# Patient Record
Sex: MALE | Race: WHITE | NOT HISPANIC OR LATINO | Employment: OTHER | URBAN - METROPOLITAN AREA
[De-identification: names, ages, dates, MRNs, and addresses within clinical notes are randomized per-mention and may not be internally consistent; named-entity substitution may affect disease eponyms.]

---

## 2017-03-13 RX ORDER — SIMVASTATIN 40 MG
40 TABLET ORAL
COMMUNITY
End: 2018-02-27 | Stop reason: DRUGHIGH

## 2017-03-13 RX ORDER — OXYCODONE HYDROCHLORIDE AND ACETAMINOPHEN 5; 325 MG/1; MG/1
1 TABLET ORAL EVERY 6 HOURS PRN
COMMUNITY
End: 2018-02-27

## 2017-03-13 RX ORDER — ALBUTEROL SULFATE 90 UG/1
2 AEROSOL, METERED RESPIRATORY (INHALATION) EVERY 4 HOURS PRN
COMMUNITY
End: 2018-02-27

## 2017-03-13 RX ORDER — LOSARTAN POTASSIUM 50 MG/1
50 TABLET ORAL DAILY
COMMUNITY
End: 2018-02-27

## 2017-03-13 RX ORDER — METOPROLOL TARTRATE 50 MG/1
50 TABLET, FILM COATED ORAL 2 TIMES DAILY
COMMUNITY
End: 2018-02-27 | Stop reason: DRUGHIGH

## 2017-03-13 RX ORDER — METOLAZONE 5 MG/1
5 TABLET ORAL DAILY
COMMUNITY
End: 2018-02-27

## 2017-03-13 RX ORDER — FUROSEMIDE 40 MG/1
40 TABLET ORAL 2 TIMES DAILY
COMMUNITY
End: 2018-02-27

## 2017-03-13 RX ORDER — POTASSIUM CHLORIDE 750 MG/1
10 CAPSULE, EXTENDED RELEASE ORAL 2 TIMES DAILY
COMMUNITY
End: 2018-02-27

## 2017-03-13 RX ORDER — ALLOPURINOL 100 MG/1
100 TABLET ORAL DAILY
COMMUNITY
End: 2018-02-27

## 2017-03-15 ENCOUNTER — ANESTHESIA EVENT (OUTPATIENT)
Dept: PERIOP | Facility: HOSPITAL | Age: 82
End: 2017-03-15
Payer: COMMERCIAL

## 2017-03-16 ENCOUNTER — ANESTHESIA (OUTPATIENT)
Dept: PERIOP | Facility: HOSPITAL | Age: 82
End: 2017-03-16
Payer: COMMERCIAL

## 2017-03-16 ENCOUNTER — HOSPITAL ENCOUNTER (OUTPATIENT)
Facility: HOSPITAL | Age: 82
Setting detail: OUTPATIENT SURGERY
Discharge: HOME | End: 2017-03-16
Attending: ANESTHESIOLOGY | Admitting: ANESTHESIOLOGY
Payer: COMMERCIAL

## 2017-03-16 ENCOUNTER — HOSPITAL ENCOUNTER (OUTPATIENT)
Dept: RADIOLOGY | Facility: HOSPITAL | Age: 82
Setting detail: OUTPATIENT SURGERY
Discharge: HOME | End: 2017-03-16
Payer: COMMERCIAL

## 2017-03-16 VITALS
HEART RATE: 80 BPM | SYSTOLIC BLOOD PRESSURE: 119 MMHG | RESPIRATION RATE: 16 BRPM | HEIGHT: 65 IN | WEIGHT: 178 LBS | TEMPERATURE: 98.2 F | DIASTOLIC BLOOD PRESSURE: 55 MMHG | BODY MASS INDEX: 29.66 KG/M2 | OXYGEN SATURATION: 97 %

## 2017-03-16 DIAGNOSIS — M47.817 SPONDYLOSIS OF LUMBOSACRAL REGION WITHOUT MYELOPATHY OR RADICULOPATHY: ICD-10-CM

## 2017-03-16 LAB
GLUCOSE SERPL-MCNC: 139 MG/DL (ref 65–140)
GLUCOSE SERPL-MCNC: 140 MG/DL (ref 65–140)

## 2017-03-16 PROCEDURE — 72100 X-RAY EXAM L-S SPINE 2/3 VWS: CPT

## 2017-03-16 PROCEDURE — 82948 REAGENT STRIP/BLOOD GLUCOSE: CPT

## 2017-03-16 RX ORDER — LIDOCAINE HYDROCHLORIDE 10 MG/ML
INJECTION, SOLUTION INFILTRATION; PERINEURAL AS NEEDED
Status: DISCONTINUED | OUTPATIENT
Start: 2017-03-16 | End: 2017-03-16 | Stop reason: HOSPADM

## 2017-03-16 RX ORDER — FENTANYL CITRATE 50 UG/ML
INJECTION, SOLUTION INTRAMUSCULAR; INTRAVENOUS AS NEEDED
Status: DISCONTINUED | OUTPATIENT
Start: 2017-03-16 | End: 2017-03-16 | Stop reason: SURG

## 2017-03-16 RX ORDER — ALBUTEROL SULFATE 2.5 MG/3ML
2.5 SOLUTION RESPIRATORY (INHALATION) ONCE AS NEEDED
Status: DISCONTINUED | OUTPATIENT
Start: 2017-03-16 | End: 2017-03-16 | Stop reason: HOSPADM

## 2017-03-16 RX ORDER — DULOXETIN HYDROCHLORIDE 30 MG/1
30 CAPSULE, DELAYED RELEASE ORAL DAILY
COMMUNITY
End: 2018-02-27

## 2017-03-16 RX ORDER — BETAMETHASONE SODIUM PHOSPHATE AND BETAMETHASONE ACETATE 3; 3 MG/ML; MG/ML
INJECTION, SUSPENSION INTRA-ARTICULAR; INTRALESIONAL; INTRAMUSCULAR; SOFT TISSUE AS NEEDED
Status: DISCONTINUED | OUTPATIENT
Start: 2017-03-16 | End: 2017-03-16 | Stop reason: HOSPADM

## 2017-03-16 RX ORDER — DIPHENHYDRAMINE HYDROCHLORIDE 50 MG/ML
INJECTION INTRAMUSCULAR; INTRAVENOUS AS NEEDED
Status: DISCONTINUED | OUTPATIENT
Start: 2017-03-16 | End: 2017-03-16 | Stop reason: SURG

## 2017-03-16 RX ORDER — ONDANSETRON 2 MG/ML
4 INJECTION INTRAMUSCULAR; INTRAVENOUS ONCE
Status: DISCONTINUED | OUTPATIENT
Start: 2017-03-16 | End: 2017-03-16 | Stop reason: HOSPADM

## 2017-03-16 RX ORDER — FENTANYL CITRATE/PF 50 MCG/ML
50 SYRINGE (ML) INJECTION
Status: DISCONTINUED | OUTPATIENT
Start: 2017-03-16 | End: 2017-03-16 | Stop reason: HOSPADM

## 2017-03-16 RX ORDER — INDOMETHACIN 50 MG/1
50 CAPSULE ORAL 2 TIMES DAILY WITH MEALS
COMMUNITY
End: 2018-02-27

## 2017-03-16 RX ORDER — VITAMIN E 268 MG
400 CAPSULE ORAL DAILY
COMMUNITY
End: 2018-02-27

## 2017-03-16 RX ORDER — FINASTERIDE 5 MG/1
5 TABLET, FILM COATED ORAL DAILY
COMMUNITY

## 2017-03-16 RX ORDER — BUDESONIDE AND FORMOTEROL FUMARATE DIHYDRATE 160; 4.5 UG/1; UG/1
2 AEROSOL RESPIRATORY (INHALATION) 2 TIMES DAILY
COMMUNITY

## 2017-03-16 RX ORDER — BUPIVACAINE HYDROCHLORIDE 5 MG/ML
INJECTION, SOLUTION PERINEURAL AS NEEDED
Status: DISCONTINUED | OUTPATIENT
Start: 2017-03-16 | End: 2017-03-16 | Stop reason: HOSPADM

## 2017-03-16 RX ORDER — GLIPIZIDE 5 MG/1
5 TABLET ORAL DAILY
COMMUNITY
End: 2018-02-27

## 2017-03-16 RX ORDER — SODIUM CHLORIDE 9 MG/ML
125 INJECTION, SOLUTION INTRAVENOUS CONTINUOUS
Status: DISCONTINUED | OUTPATIENT
Start: 2017-03-16 | End: 2017-03-16 | Stop reason: HOSPADM

## 2017-03-16 RX ADMIN — FENTANYL CITRATE 50 MCG: 50 INJECTION, SOLUTION INTRAMUSCULAR; INTRAVENOUS at 14:05

## 2017-03-16 RX ADMIN — FENTANYL CITRATE 50 MCG: 50 INJECTION, SOLUTION INTRAMUSCULAR; INTRAVENOUS at 14:04

## 2017-03-16 RX ADMIN — DIPHENHYDRAMINE HYDROCHLORIDE 12.5 MG: 50 INJECTION INTRAMUSCULAR; INTRAVENOUS at 14:14

## 2017-03-16 RX ADMIN — FENTANYL CITRATE 50 MCG: 50 INJECTION, SOLUTION INTRAMUSCULAR; INTRAVENOUS at 14:00

## 2017-03-16 RX ADMIN — SODIUM CHLORIDE 125 ML/HR: 0.9 INJECTION, SOLUTION INTRAVENOUS at 13:20

## 2017-03-16 RX ADMIN — FENTANYL CITRATE 50 MCG: 50 INJECTION, SOLUTION INTRAMUSCULAR; INTRAVENOUS at 14:02

## 2017-09-27 ENCOUNTER — OFFICE VISIT (OUTPATIENT)
Dept: URGENT CARE | Facility: CLINIC | Age: 82
End: 2017-09-27
Payer: COMMERCIAL

## 2017-09-27 PROCEDURE — 99213 OFFICE O/P EST LOW 20 MIN: CPT

## 2017-09-27 PROCEDURE — G0463 HOSPITAL OUTPT CLINIC VISIT: HCPCS

## 2017-10-03 NOTE — PROGRESS NOTES
Assessment  1  Acute bronchitis (466 0) (J20 9)    Plan  Acute bronchitis    · Azithromycin 250 MG Oral Tablet; TAKE 2 TABLETS ON DAY 1 THEN TAKE 1  TABLET A DAY FOR 4 DAYS    Discussion/Summary  Discussion Summary:   1) take antibiotic as prescribedtake albuterol inhaler every 4 hoursfollow up with primary care doctor as soon as possible  Medication Side Effects Reviewed: Possible side effects of new medications were reviewed with the patient/guardian today  Understands and agrees with treatment plan: The treatment plan was reviewed with the patient/guardian  The patient/guardian understands and agrees with the treatment plan   Counseling Documentation With Imm: The patient, patient's family was counseled regarding instructions for management,-patient and family education,-importance of compliance with treatment  Chief Complaint  1  Cold Symptoms    History of Present Illness  HPI: 81yo M p/w dry cough and chest congestion x 3 weeks  Pt denies n/v/d, sob/wheezing, fever/chills  Review of Systems  Focused-Male:   Constitutional: no fever or chills, feels well, no tiredness, no recent weight loss or weight gain  ENT: no complaints of earache, no loss of hearing, no nosebleeds or nasal discharge, no sore throat or hoarseness  Cardiovascular: no complaints of slow or fast heart rate, no chest pain, no palpitations, no leg claudication or lower extremity edema  Respiratory: cough, but-no shortness of breath,-no orthopnea,-no wheezing,-no shortness of breath during exertion-and-no PND  Gastrointestinal: no complaints of abdominal pain, no constipation, no nausea or vomiting, no diarrhea or bloody stools  Genitourinary: no complaints of dysuria or incontinence, no hesitancy, no nocturia, no genital lesion, no inadequacy of penile erection  Musculoskeletal: no complaints of arthralgia, no myalgia, no joint swelling or stiffness, no limb pain or swelling     Integumentary: no complaints of skin rash or lesion, no itching or dry skin, no skin wounds  Neurological: no complaints of headache, no confusion, no numbness or tingling, no dizziness or fainting  ROS Reviewed:   ROS reviewed  Past Medical History  Active Problems And Past Medical History Reviewed: The active problems and past medical history were reviewed and updated today  Family History  Family History Reviewed: The family history was reviewed and updated today  Social History  Social History Reviewed: The social history was reviewed and is unchanged  Surgical History  Surgical History Reviewed: The surgical history was reviewed and updated today  Current Meds   1  Albuterol 90 MCG/ACT AERS; Therapy: (Recorded:27Sep2017) to Recorded   2  Allopurinol Sodium SOLR;   Therapy: (Recorded:27Sep2017) to Recorded   3  Aspir-Low TBEC; Therapy: (Recorded:27Sep2017) to Recorded   4  DULoxetine HCl - 20 MG Oral Capsule Delayed Release Particles; Therapy: (Recorded:27Sep2017) to Recorded   5  Finasteride 5 MG Oral Tablet; Therapy: (Recorded:27Sep2017) to Recorded   6  Furosemide TABS; Therapy: (Recorded:27Sep2017) to Recorded   7  GlipiZIDE TABS; Therapy: (Recorded:27Sep2017) to Recorded   8  Indomethacin 25 MG Oral Capsule; Therapy: (Recorded:[id number]) to Recorded   9  Losartan Potassium TABS; Therapy: (Recorded:27Sep2017) to Recorded   10  Metoprolol Succinate 100 MG TBCR; Therapy: (Recorded:[id number]) to Recorded   11  Simvastatin TABS; Therapy: (Recorded:[id number]) to Recorded   12  Symbicort AERO; Therapy: (Recorded:27Sep2017) to Recorded   13  Tradjenta 5 MG Oral Tablet; Therapy: (Recorded:00Nym4695) to Recorded  Medication List Reviewed: The medication list was reviewed and updated today  Allergies  1   No Known Drug Allergies    Vitals  Signs   Recorded: [id number] 12:25PM   Temperature: 98 2 F  Heart Rate: 60  Respiration: 18  Systolic: 635  Diastolic: 57  Height: 5 ft 4 in  Weight: 176 lb 12 8 oz  BMI Calculated: 30 35  BSA Calculated: 1 86  O2 Saturation: 99    Physical Exam    Constitutional   General appearance: No acute distress, well appearing and well nourished  Ears, Nose, Mouth, and Throat   Otoscopic examination: Tympanic membrance translucent with normal light reflex  Canals patent without erythema  Nasal mucosa, septum, and turbinates: Abnormal   normal nasal septum,-no intranasal masses or polyps-and-normal nasal turbinates  There was a purulent discharge from both nares  The bilateral nasal mucosa was edematous  Oropharynx: Normal with no erythema, edema, exudate or lesions  Pulmonary   Respiratory effort: Abnormal   Respiratory rate: normal  Assessment of respiratory effort revealed normal rhythm and effort-and-no distress  Respiratory Findings: dry cough  Auscultation of lungs: Abnormal   no rales or crackles were heard bilaterally  no rhonchi  no friction rub  diffuse wheezing bilaterally-and-mild diffuse expiratory wheezes  no diminished breath sounds  Cardiovascular   Palpation of heart: Normal PMI, no thrills  Auscultation of heart: Normal rate and rhythm, normal S1 and S2, without murmurs  Lymphatic   Palpation of lymph nodes in neck: Abnormal   bilateral anterior cervical node enlargement, but-no posterior cervical node enlargement        Signatures   Electronically signed by : VIVIANA Tejada; Sep 27 2017  2:18PM EST                       (Author)    Electronically signed by : Melecio Kussmaul, D O ; Oct  2 2017  2:29PM EST                       (Co-author)

## 2017-10-04 ENCOUNTER — APPOINTMENT (OUTPATIENT)
Dept: RADIOLOGY | Facility: CLINIC | Age: 82
End: 2017-10-04
Payer: COMMERCIAL

## 2017-10-04 ENCOUNTER — TRANSCRIBE ORDERS (OUTPATIENT)
Dept: URGENT CARE | Facility: CLINIC | Age: 82
End: 2017-10-04

## 2017-10-04 ENCOUNTER — APPOINTMENT (OUTPATIENT)
Dept: LAB | Facility: CLINIC | Age: 82
End: 2017-10-04
Payer: COMMERCIAL

## 2017-10-04 ENCOUNTER — TRANSCRIBE ORDERS (OUTPATIENT)
Dept: LAB | Facility: CLINIC | Age: 82
End: 2017-10-04

## 2017-10-04 DIAGNOSIS — Z13.6 SCREENING FOR ISCHEMIC HEART DISEASE: Primary | ICD-10-CM

## 2017-10-04 DIAGNOSIS — Z13.6 SCREENING FOR ISCHEMIC HEART DISEASE: ICD-10-CM

## 2017-10-04 DIAGNOSIS — R05.9 COUGH: ICD-10-CM

## 2017-10-04 DIAGNOSIS — R05.9 COUGH: Primary | ICD-10-CM

## 2017-10-04 LAB
ALBUMIN SERPL BCP-MCNC: 3.1 G/DL (ref 3.5–5)
ALP SERPL-CCNC: 80 U/L (ref 46–116)
ALT SERPL W P-5'-P-CCNC: 20 U/L (ref 12–78)
ANION GAP SERPL CALCULATED.3IONS-SCNC: 7 MMOL/L (ref 4–13)
AST SERPL W P-5'-P-CCNC: 28 U/L (ref 5–45)
BASOPHILS # BLD AUTO: 0.01 THOUSANDS/ΜL (ref 0–0.1)
BASOPHILS NFR BLD AUTO: 0 % (ref 0–1)
BILIRUB SERPL-MCNC: 0.64 MG/DL (ref 0.2–1)
BUN SERPL-MCNC: 44 MG/DL (ref 5–25)
CALCIUM SERPL-MCNC: 8.4 MG/DL (ref 8.3–10.1)
CHLORIDE SERPL-SCNC: 100 MMOL/L (ref 100–108)
CHOLEST SERPL-MCNC: 134 MG/DL (ref 50–200)
CO2 SERPL-SCNC: 27 MMOL/L (ref 21–32)
CREAT SERPL-MCNC: 1.48 MG/DL (ref 0.6–1.3)
EOSINOPHIL # BLD AUTO: 0.02 THOUSAND/ΜL (ref 0–0.61)
EOSINOPHIL NFR BLD AUTO: 0 % (ref 0–6)
ERYTHROCYTE [DISTWIDTH] IN BLOOD BY AUTOMATED COUNT: 15.4 % (ref 11.6–15.1)
GFR SERPL CREATININE-BSD FRML MDRD: 42 ML/MIN/1.73SQ M
GLUCOSE P FAST SERPL-MCNC: 142 MG/DL (ref 65–99)
HCT VFR BLD AUTO: 34.7 % (ref 36.5–49.3)
HDLC SERPL-MCNC: 51 MG/DL (ref 40–60)
HGB BLD-MCNC: 11.1 G/DL (ref 12–17)
LDLC SERPL CALC-MCNC: 61 MG/DL (ref 0–100)
LYMPHOCYTES # BLD AUTO: 0.85 THOUSANDS/ΜL (ref 0.6–4.47)
LYMPHOCYTES NFR BLD AUTO: 15 % (ref 14–44)
MCH RBC QN AUTO: 27.5 PG (ref 26.8–34.3)
MCHC RBC AUTO-ENTMCNC: 32 G/DL (ref 31.4–37.4)
MCV RBC AUTO: 86 FL (ref 82–98)
MONOCYTES # BLD AUTO: 2.55 THOUSAND/ΜL (ref 0.17–1.22)
MONOCYTES NFR BLD AUTO: 46 % (ref 4–12)
NEUTROPHILS # BLD AUTO: 2.24 THOUSANDS/ΜL (ref 1.85–7.62)
NEUTS SEG NFR BLD AUTO: 39 % (ref 43–75)
NRBC BLD AUTO-RTO: 0 /100 WBCS
PLATELET # BLD AUTO: 214 THOUSANDS/UL (ref 149–390)
PMV BLD AUTO: 11.8 FL (ref 8.9–12.7)
POTASSIUM SERPL-SCNC: 3.9 MMOL/L (ref 3.5–5.3)
PROT SERPL-MCNC: 7.6 G/DL (ref 6.4–8.2)
RBC # BLD AUTO: 4.04 MILLION/UL (ref 3.88–5.62)
SODIUM SERPL-SCNC: 134 MMOL/L (ref 136–145)
TRIGL SERPL-MCNC: 112 MG/DL
URATE SERPL-MCNC: 7.1 MG/DL (ref 4.2–8)
WBC # BLD AUTO: 5.75 THOUSAND/UL (ref 4.31–10.16)

## 2017-10-04 PROCEDURE — 80053 COMPREHEN METABOLIC PANEL: CPT

## 2017-10-04 PROCEDURE — 84550 ASSAY OF BLOOD/URIC ACID: CPT

## 2017-10-04 PROCEDURE — 36415 COLL VENOUS BLD VENIPUNCTURE: CPT

## 2017-10-04 PROCEDURE — 85025 COMPLETE CBC W/AUTO DIFF WBC: CPT

## 2017-10-04 PROCEDURE — 71020 HB CHEST X-RAY 2VW FRONTAL&LATL: CPT

## 2017-10-04 PROCEDURE — 80061 LIPID PANEL: CPT

## 2018-02-27 ENCOUNTER — APPOINTMENT (EMERGENCY)
Dept: RADIOLOGY | Facility: HOSPITAL | Age: 83
DRG: 693 | End: 2018-02-27
Payer: COMMERCIAL

## 2018-02-27 ENCOUNTER — APPOINTMENT (EMERGENCY)
Dept: CT IMAGING | Facility: HOSPITAL | Age: 83
DRG: 693 | End: 2018-02-27
Payer: COMMERCIAL

## 2018-02-27 ENCOUNTER — HOSPITAL ENCOUNTER (INPATIENT)
Facility: HOSPITAL | Age: 83
LOS: 13 days | Discharge: NURSING, REHAB OR LONG-TERM CARE | DRG: 693 | End: 2018-03-12
Attending: EMERGENCY MEDICINE | Admitting: INTERNAL MEDICINE
Payer: COMMERCIAL

## 2018-02-27 DIAGNOSIS — N20.1 URETEROLITHIASIS: ICD-10-CM

## 2018-02-27 DIAGNOSIS — N20.0 KIDNEY STONE: Primary | ICD-10-CM

## 2018-02-27 DIAGNOSIS — I50.23 ACUTE ON CHRONIC SYSTOLIC CHF (CONGESTIVE HEART FAILURE) (HCC): ICD-10-CM

## 2018-02-27 DIAGNOSIS — N39.0 UTI (URINARY TRACT INFECTION): ICD-10-CM

## 2018-02-27 DIAGNOSIS — R77.8 ELEVATED TROPONIN I LEVEL: ICD-10-CM

## 2018-02-27 DIAGNOSIS — N17.9 ACUTE KIDNEY INJURY SUPERIMPOSED ON CHRONIC KIDNEY DISEASE (HCC): ICD-10-CM

## 2018-02-27 DIAGNOSIS — I21.A1 NON-ST ELEVATION MYOCARDIAL INFARCTION (NSTEMI), TYPE 2: ICD-10-CM

## 2018-02-27 DIAGNOSIS — N18.9 ACUTE KIDNEY INJURY SUPERIMPOSED ON CHRONIC KIDNEY DISEASE (HCC): ICD-10-CM

## 2018-02-27 PROBLEM — I10 ESSENTIAL HYPERTENSION: Status: ACTIVE | Noted: 2018-02-27

## 2018-02-27 PROBLEM — E11.9 TYPE 2 DIABETES MELLITUS (HCC): Status: ACTIVE | Noted: 2018-02-27

## 2018-02-27 PROBLEM — J44.9 COPD (CHRONIC OBSTRUCTIVE PULMONARY DISEASE) (HCC): Status: ACTIVE | Noted: 2018-02-27

## 2018-02-27 PROBLEM — D72.819 LEUKOPENIA: Status: ACTIVE | Noted: 2018-02-27

## 2018-02-27 PROBLEM — R10.9 ABDOMINAL PAIN: Status: ACTIVE | Noted: 2018-02-27

## 2018-02-27 PROBLEM — I50.20 SYSTOLIC CONGESTIVE HEART FAILURE (HCC): Status: ACTIVE | Noted: 2018-02-27

## 2018-02-27 PROBLEM — E78.5 HYPERLIPIDEMIA: Status: ACTIVE | Noted: 2018-02-27

## 2018-02-27 PROBLEM — N18.30 STAGE 3 CHRONIC KIDNEY DISEASE (HCC): Status: ACTIVE | Noted: 2018-02-27

## 2018-02-27 PROBLEM — D69.6 THROMBOCYTOPENIA (HCC): Status: ACTIVE | Noted: 2018-02-27

## 2018-02-27 LAB
ALBUMIN SERPL BCP-MCNC: 3.3 G/DL (ref 3.5–5)
ALP SERPL-CCNC: 93 U/L (ref 46–116)
ALT SERPL W P-5'-P-CCNC: 20 U/L (ref 12–78)
ANION GAP SERPL CALCULATED.3IONS-SCNC: 9 MMOL/L (ref 4–13)
AST SERPL W P-5'-P-CCNC: 24 U/L (ref 5–45)
BACTERIA UR QL AUTO: ABNORMAL /HPF
BASOPHILS # BLD AUTO: 0.01 THOUSANDS/ΜL (ref 0–0.1)
BASOPHILS NFR BLD AUTO: 0 % (ref 0–1)
BILIRUB SERPL-MCNC: 0.6 MG/DL (ref 0.2–1)
BILIRUB UR QL STRIP: NEGATIVE
BUN SERPL-MCNC: 53 MG/DL (ref 5–25)
CALCIUM SERPL-MCNC: 8.8 MG/DL (ref 8.3–10.1)
CHLORIDE SERPL-SCNC: 105 MMOL/L (ref 100–108)
CLARITY UR: CLEAR
CO2 SERPL-SCNC: 28 MMOL/L (ref 21–32)
COLOR UR: YELLOW
CREAT SERPL-MCNC: 1.99 MG/DL (ref 0.6–1.3)
EOSINOPHIL # BLD AUTO: 0.04 THOUSAND/ΜL (ref 0–0.61)
EOSINOPHIL NFR BLD AUTO: 1 % (ref 0–6)
ERYTHROCYTE [DISTWIDTH] IN BLOOD BY AUTOMATED COUNT: 19.8 % (ref 11.6–15.1)
GFR SERPL CREATININE-BSD FRML MDRD: 29 ML/MIN/1.73SQ M
GLUCOSE SERPL-MCNC: 147 MG/DL (ref 65–140)
GLUCOSE SERPL-MCNC: 77 MG/DL (ref 65–140)
GLUCOSE UR STRIP-MCNC: NEGATIVE MG/DL
HCT VFR BLD AUTO: 37.8 % (ref 36.5–49.3)
HGB BLD-MCNC: 11.3 G/DL (ref 12–17)
HGB UR QL STRIP.AUTO: NEGATIVE
HOLD SPECIMEN: NORMAL
HOLD SPECIMEN: NORMAL
INR PPP: 1.25 (ref 0.86–1.16)
KETONES UR STRIP-MCNC: NEGATIVE MG/DL
LEUKOCYTE ESTERASE UR QL STRIP: ABNORMAL
LIPASE SERPL-CCNC: 69 U/L (ref 73–393)
LYMPHOCYTES # BLD AUTO: 0.53 THOUSANDS/ΜL (ref 0.6–4.47)
LYMPHOCYTES NFR BLD AUTO: 13 % (ref 14–44)
MCH RBC QN AUTO: 25.1 PG (ref 26.8–34.3)
MCHC RBC AUTO-ENTMCNC: 29.9 G/DL (ref 31.4–37.4)
MCV RBC AUTO: 84 FL (ref 82–98)
MONOCYTES # BLD AUTO: 1.6 THOUSAND/ΜL (ref 0.17–1.22)
MONOCYTES NFR BLD AUTO: 40 % (ref 4–12)
NEUTROPHILS # BLD AUTO: 1.85 THOUSANDS/ΜL (ref 1.85–7.62)
NEUTS SEG NFR BLD AUTO: 46 % (ref 43–75)
NITRITE UR QL STRIP: NEGATIVE
NON-SQ EPI CELLS URNS QL MICRO: ABNORMAL /HPF
PH UR STRIP.AUTO: 5.5 [PH] (ref 4.5–8)
PLATELET # BLD AUTO: 109 THOUSANDS/UL (ref 149–390)
PLATELET # BLD AUTO: 139 THOUSANDS/UL (ref 149–390)
PMV BLD AUTO: 10.7 FL (ref 8.9–12.7)
PMV BLD AUTO: 9.5 FL (ref 8.9–12.7)
POTASSIUM SERPL-SCNC: 4.9 MMOL/L (ref 3.5–5.3)
PROT SERPL-MCNC: 7.4 G/DL (ref 6.4–8.2)
PROT UR STRIP-MCNC: NEGATIVE MG/DL
PROTHROMBIN TIME: 15.6 SECONDS (ref 12.1–14.4)
RBC # BLD AUTO: 4.5 MILLION/UL (ref 3.88–5.62)
RBC #/AREA URNS AUTO: ABNORMAL /HPF
SODIUM SERPL-SCNC: 142 MMOL/L (ref 136–145)
SP GR UR STRIP.AUTO: 1.01 (ref 1–1.03)
TROPONIN I SERPL-MCNC: 0.05 NG/ML
TROPONIN I SERPL-MCNC: 0.06 NG/ML
UROBILINOGEN UR QL STRIP.AUTO: 0.2 E.U./DL
WBC # BLD AUTO: 4.03 THOUSAND/UL (ref 4.31–10.16)
WBC #/AREA URNS AUTO: ABNORMAL /HPF

## 2018-02-27 PROCEDURE — 74176 CT ABD & PELVIS W/O CONTRAST: CPT

## 2018-02-27 PROCEDURE — 84300 ASSAY OF URINE SODIUM: CPT | Performed by: INTERNAL MEDICINE

## 2018-02-27 PROCEDURE — 87040 BLOOD CULTURE FOR BACTERIA: CPT | Performed by: EMERGENCY MEDICINE

## 2018-02-27 PROCEDURE — 84484 ASSAY OF TROPONIN QUANT: CPT | Performed by: EMERGENCY MEDICINE

## 2018-02-27 PROCEDURE — 80053 COMPREHEN METABOLIC PANEL: CPT | Performed by: EMERGENCY MEDICINE

## 2018-02-27 PROCEDURE — 93005 ELECTROCARDIOGRAM TRACING: CPT | Performed by: EMERGENCY MEDICINE

## 2018-02-27 PROCEDURE — 87077 CULTURE AEROBIC IDENTIFY: CPT | Performed by: INTERNAL MEDICINE

## 2018-02-27 PROCEDURE — 83880 ASSAY OF NATRIURETIC PEPTIDE: CPT | Performed by: INTERNAL MEDICINE

## 2018-02-27 PROCEDURE — 96374 THER/PROPH/DIAG INJ IV PUSH: CPT

## 2018-02-27 PROCEDURE — 85610 PROTHROMBIN TIME: CPT | Performed by: EMERGENCY MEDICINE

## 2018-02-27 PROCEDURE — 87081 CULTURE SCREEN ONLY: CPT | Performed by: PHYSICIAN ASSISTANT

## 2018-02-27 PROCEDURE — 83690 ASSAY OF LIPASE: CPT | Performed by: EMERGENCY MEDICINE

## 2018-02-27 PROCEDURE — 36415 COLL VENOUS BLD VENIPUNCTURE: CPT | Performed by: EMERGENCY MEDICINE

## 2018-02-27 PROCEDURE — 87147 CULTURE TYPE IMMUNOLOGIC: CPT | Performed by: PHYSICIAN ASSISTANT

## 2018-02-27 PROCEDURE — 82948 REAGENT STRIP/BLOOD GLUCOSE: CPT

## 2018-02-27 PROCEDURE — 85025 COMPLETE CBC W/AUTO DIFF WBC: CPT | Performed by: EMERGENCY MEDICINE

## 2018-02-27 PROCEDURE — 84156 ASSAY OF PROTEIN URINE: CPT | Performed by: INTERNAL MEDICINE

## 2018-02-27 PROCEDURE — 85049 AUTOMATED PLATELET COUNT: CPT | Performed by: PHYSICIAN ASSISTANT

## 2018-02-27 PROCEDURE — 99222 1ST HOSP IP/OBS MODERATE 55: CPT | Performed by: PHYSICIAN ASSISTANT

## 2018-02-27 PROCEDURE — 82570 ASSAY OF URINE CREATININE: CPT | Performed by: INTERNAL MEDICINE

## 2018-02-27 PROCEDURE — 81001 URINALYSIS AUTO W/SCOPE: CPT | Performed by: EMERGENCY MEDICINE

## 2018-02-27 PROCEDURE — 71045 X-RAY EXAM CHEST 1 VIEW: CPT

## 2018-02-27 PROCEDURE — 96361 HYDRATE IV INFUSION ADD-ON: CPT

## 2018-02-27 PROCEDURE — 99285 EMERGENCY DEPT VISIT HI MDM: CPT

## 2018-02-27 PROCEDURE — 87186 SC STD MICRODIL/AGAR DIL: CPT | Performed by: INTERNAL MEDICINE

## 2018-02-27 PROCEDURE — 82272 OCCULT BLD FECES 1-3 TESTS: CPT

## 2018-02-27 PROCEDURE — 84484 ASSAY OF TROPONIN QUANT: CPT | Performed by: PHYSICIAN ASSISTANT

## 2018-02-27 PROCEDURE — 87086 URINE CULTURE/COLONY COUNT: CPT | Performed by: INTERNAL MEDICINE

## 2018-02-27 RX ORDER — ONDANSETRON 2 MG/ML
4 INJECTION INTRAMUSCULAR; INTRAVENOUS EVERY 6 HOURS PRN
Status: DISCONTINUED | OUTPATIENT
Start: 2018-02-27 | End: 2018-03-12 | Stop reason: HOSPADM

## 2018-02-27 RX ORDER — LEVOTHYROXINE SODIUM 0.12 MG/1
125 TABLET ORAL DAILY
COMMUNITY
End: 2018-03-12 | Stop reason: HOSPADM

## 2018-02-27 RX ORDER — RENO CAPS 100; 1.5; 1.7; 20; 10; 1; 150; 5; 6 MG/1; MG/1; MG/1; MG/1; MG/1; MG/1; UG/1; MG/1; UG/1
1 CAPSULE ORAL DAILY
COMMUNITY

## 2018-02-27 RX ORDER — ZOLPIDEM TARTRATE 5 MG/1
5 TABLET ORAL
Status: DISCONTINUED | OUTPATIENT
Start: 2018-02-27 | End: 2018-02-27

## 2018-02-27 RX ORDER — FLUTICASONE PROPIONATE 50 MCG
2 SPRAY, SUSPENSION (ML) NASAL DAILY
Status: DISCONTINUED | OUTPATIENT
Start: 2018-02-28 | End: 2018-02-27

## 2018-02-27 RX ORDER — TRAZODONE HYDROCHLORIDE 50 MG/1
50 TABLET ORAL
Status: DISCONTINUED | OUTPATIENT
Start: 2018-02-27 | End: 2018-03-12 | Stop reason: HOSPADM

## 2018-02-27 RX ORDER — FLUTICASONE PROPIONATE 50 MCG
2 SPRAY, SUSPENSION (ML) NASAL DAILY
COMMUNITY

## 2018-02-27 RX ORDER — FLUTICASONE PROPIONATE 50 MCG
1 SPRAY, SUSPENSION (ML) NASAL DAILY
Status: DISCONTINUED | OUTPATIENT
Start: 2018-02-28 | End: 2018-03-12 | Stop reason: HOSPADM

## 2018-02-27 RX ORDER — ASPIRIN 325 MG
325 TABLET ORAL DAILY
COMMUNITY
End: 2018-03-12 | Stop reason: HOSPADM

## 2018-02-27 RX ORDER — BUDESONIDE AND FORMOTEROL FUMARATE DIHYDRATE 160; 4.5 UG/1; UG/1
2 AEROSOL RESPIRATORY (INHALATION)
Status: DISCONTINUED | OUTPATIENT
Start: 2018-02-27 | End: 2018-03-12 | Stop reason: HOSPADM

## 2018-02-27 RX ORDER — SIMVASTATIN 20 MG
20 TABLET ORAL
COMMUNITY
End: 2018-03-12 | Stop reason: HOSPADM

## 2018-02-27 RX ORDER — ECHINACEA PURPUREA EXTRACT 125 MG
1 TABLET ORAL 4 TIMES DAILY
COMMUNITY

## 2018-02-27 RX ORDER — PANTOPRAZOLE SODIUM 40 MG/1
40 GRANULE, DELAYED RELEASE ORAL DAILY
COMMUNITY

## 2018-02-27 RX ORDER — ASPIRIN 325 MG
325 TABLET ORAL DAILY
Status: DISCONTINUED | OUTPATIENT
Start: 2018-02-28 | End: 2018-02-27

## 2018-02-27 RX ORDER — ONDANSETRON 2 MG/ML
4 INJECTION INTRAMUSCULAR; INTRAVENOUS ONCE
Status: COMPLETED | OUTPATIENT
Start: 2018-02-27 | End: 2018-02-27

## 2018-02-27 RX ORDER — TRAZODONE HYDROCHLORIDE 50 MG/1
50 TABLET ORAL
COMMUNITY

## 2018-02-27 RX ORDER — SODIUM CHLORIDE 9 MG/ML
75 INJECTION, SOLUTION INTRAVENOUS CONTINUOUS
Status: DISCONTINUED | OUTPATIENT
Start: 2018-02-27 | End: 2018-02-27

## 2018-02-27 RX ORDER — CHOLECALCIFEROL (VITAMIN D3) 10 MCG
1 TABLET ORAL
Status: DISCONTINUED | OUTPATIENT
Start: 2018-02-28 | End: 2018-03-12 | Stop reason: HOSPADM

## 2018-02-27 RX ORDER — PRAVASTATIN SODIUM 40 MG
40 TABLET ORAL
Status: DISCONTINUED | OUTPATIENT
Start: 2018-02-28 | End: 2018-02-27

## 2018-02-27 RX ORDER — FINASTERIDE 5 MG/1
5 TABLET, FILM COATED ORAL DAILY
Status: DISCONTINUED | OUTPATIENT
Start: 2018-02-28 | End: 2018-02-27

## 2018-02-27 RX ORDER — GLIMEPIRIDE 2 MG/1
2 TABLET ORAL
COMMUNITY
End: 2018-03-12 | Stop reason: HOSPADM

## 2018-02-27 RX ORDER — ACETAMINOPHEN 325 MG/1
650 TABLET ORAL EVERY 6 HOURS PRN
Status: DISCONTINUED | OUTPATIENT
Start: 2018-02-27 | End: 2018-02-27

## 2018-02-27 RX ORDER — CIPROFLOXACIN 2 MG/ML
400 INJECTION, SOLUTION INTRAVENOUS ONCE
Status: COMPLETED | OUTPATIENT
Start: 2018-02-27 | End: 2018-02-27

## 2018-02-27 RX ORDER — ZOLPIDEM TARTRATE 5 MG/1
5 TABLET ORAL
COMMUNITY
End: 2018-03-12 | Stop reason: HOSPADM

## 2018-02-27 RX ORDER — TAMSULOSIN HYDROCHLORIDE 0.4 MG/1
0.4 CAPSULE ORAL
COMMUNITY

## 2018-02-27 RX ORDER — BISACODYL 10 MG
10 SUPPOSITORY, RECTAL RECTAL AS NEEDED
COMMUNITY

## 2018-02-27 RX ORDER — HEPARIN SODIUM 5000 [USP'U]/ML
5000 INJECTION, SOLUTION INTRAVENOUS; SUBCUTANEOUS EVERY 8 HOURS SCHEDULED
Status: DISCONTINUED | OUTPATIENT
Start: 2018-02-27 | End: 2018-03-12 | Stop reason: HOSPADM

## 2018-02-27 RX ORDER — ATORVASTATIN CALCIUM 40 MG/1
40 TABLET, FILM COATED ORAL
Status: DISCONTINUED | OUTPATIENT
Start: 2018-02-28 | End: 2018-03-12 | Stop reason: HOSPADM

## 2018-02-27 RX ORDER — TAMSULOSIN HYDROCHLORIDE 0.4 MG/1
0.4 CAPSULE ORAL
Status: DISCONTINUED | OUTPATIENT
Start: 2018-02-28 | End: 2018-03-12 | Stop reason: HOSPADM

## 2018-02-27 RX ORDER — HYDROXYZINE HYDROCHLORIDE 25 MG/1
25 TABLET, FILM COATED ORAL 2 TIMES DAILY
Status: DISCONTINUED | OUTPATIENT
Start: 2018-02-27 | End: 2018-02-27

## 2018-02-27 RX ORDER — MELATONIN
1000 DAILY
COMMUNITY

## 2018-02-27 RX ORDER — ASPIRIN 81 MG/1
81 TABLET, CHEWABLE ORAL DAILY
Status: DISCONTINUED | OUTPATIENT
Start: 2018-02-28 | End: 2018-03-12 | Stop reason: HOSPADM

## 2018-02-27 RX ORDER — ACETAMINOPHEN 325 MG/1
650 TABLET ORAL EVERY 6 HOURS PRN
COMMUNITY

## 2018-02-27 RX ORDER — LEVOTHYROXINE SODIUM 0.12 MG/1
125 TABLET ORAL
Status: DISCONTINUED | OUTPATIENT
Start: 2018-02-28 | End: 2018-03-03

## 2018-02-27 RX ORDER — PANTOPRAZOLE SODIUM 20 MG/1
20 TABLET, DELAYED RELEASE ORAL
Status: DISCONTINUED | OUTPATIENT
Start: 2018-02-28 | End: 2018-02-27

## 2018-02-27 RX ORDER — BUMETANIDE 1 MG/1
1 TABLET ORAL 2 TIMES DAILY
COMMUNITY

## 2018-02-27 RX ORDER — ACETAMINOPHEN 325 MG/1
650 TABLET ORAL EVERY 6 HOURS PRN
Status: DISCONTINUED | OUTPATIENT
Start: 2018-02-27 | End: 2018-03-12 | Stop reason: HOSPADM

## 2018-02-27 RX ORDER — BUMETANIDE 1 MG/1
1 TABLET ORAL 2 TIMES DAILY
Status: DISCONTINUED | OUTPATIENT
Start: 2018-02-27 | End: 2018-02-27

## 2018-02-27 RX ORDER — HYDROXYZINE HYDROCHLORIDE 25 MG/1
25 TABLET, FILM COATED ORAL 2 TIMES DAILY
COMMUNITY
End: 2018-03-12 | Stop reason: HOSPADM

## 2018-02-27 RX ADMIN — METOPROLOL TARTRATE 25 MG: 25 TABLET ORAL at 23:05

## 2018-02-27 RX ADMIN — HEPARIN SODIUM 5000 UNITS: 5000 INJECTION, SOLUTION INTRAVENOUS; SUBCUTANEOUS at 23:05

## 2018-02-27 RX ADMIN — ONDANSETRON 4 MG: 2 INJECTION INTRAMUSCULAR; INTRAVENOUS at 13:21

## 2018-02-27 RX ADMIN — TRAZODONE HYDROCHLORIDE 50 MG: 50 TABLET ORAL at 23:05

## 2018-02-27 RX ADMIN — SODIUM CHLORIDE 75 ML/HR: 0.9 INJECTION, SOLUTION INTRAVENOUS at 22:58

## 2018-02-27 RX ADMIN — SODIUM CHLORIDE 500 ML: 0.9 INJECTION, SOLUTION INTRAVENOUS at 13:20

## 2018-02-27 RX ADMIN — CIPROFLOXACIN 400 MG: 2 INJECTION, SOLUTION INTRAVENOUS at 20:50

## 2018-02-27 RX ADMIN — HYDROXYZINE HYDROCHLORIDE 25 MG: 25 TABLET ORAL at 23:05

## 2018-02-27 RX ADMIN — SODIUM CHLORIDE 1000 ML: 0.9 INJECTION, SOLUTION INTRAVENOUS at 18:54

## 2018-02-27 RX ADMIN — BUMETANIDE 1 MG: 1 TABLET ORAL at 23:05

## 2018-02-27 NOTE — ED PROVIDER NOTES
History  Chief Complaint   Patient presents with    Diarrhea     N/V/D since last night      Curly Perla is an 70-year-old male resident of [facility] presents with nausea vomiting diarrhea since yesterday and they are concerned about abdominal distension  There is no history of any fever  He is denying any chest pain his breathing is OK  No history of any trauma or falls  No lightheadedness  Patient is asking for something to drink; Daughter calls in states she id being treated for C-diff; patient is dnr/dni            Prior to Admission Medications   Prescriptions Last Dose Informant Patient Reported? Taking?    B Complex-C-Folic Acid (DAVIDE CAPS) 1 MG CAPS   Yes Yes   Sig: Take 1 mg by mouth daily   Sodium Phosphates (FLEET ENEMA RE)   Yes Yes   Sig: Insert 1 enema into the rectum as needed (if no BM on day 5)   acetaminophen (TYLENOL) 325 mg tablet   Yes Yes   Sig: Take 650 mg by mouth every 6 (six) hours as needed for mild pain or fever   aspirin 325 mg tablet   Yes Yes   Sig: Take 325 mg by mouth daily   bisacodyl (BISCOLAX) 10 mg suppository   Yes Yes   Sig: Insert 10 mg into the rectum as needed for constipation (if no BM on day 4)   budesonide-formoterol (SYMBICORT) 160-4 5 mcg/act inhaler   Yes Yes   Sig: Inhale 2 puffs 2 (two) times a day   bumetanide (BUMEX) 1 mg tablet   Yes Yes   Sig: Take 1 mg by mouth 2 (two) times a day   cholecalciferol (VITAMIN D3) 1,000 units tablet   Yes Yes   Sig: Take 1,000 Units by mouth daily   finasteride (PROSCAR) 5 mg tablet   Yes Yes   Sig: Take 5 mg by mouth daily   fluticasone (FLONASE) 50 mcg/act nasal spray   Yes Yes   Si sprays into each nostril daily   glimepiride (AMARYL) 2 mg tablet   Yes Yes   Sig: Take 2 mg by mouth daily with breakfast   hydrOXYzine HCL (ATARAX) 25 mg tablet   Yes Yes   Sig: Take 25 mg by mouth 2 (two) times a day   insulin aspart (NovoLOG) 100 units/mL injection   Yes Yes   Sig: Inject 0-12 Units under the skin 4 (four) times a day (before meals and at bedtime)   levothyroxine 125 mcg tablet   Yes Yes   Sig: Take 125 mcg by mouth daily   lubiprostone (AMITIZA) 24 mcg capsule   Yes Yes   Sig: Take 24 mcg by mouth daily as needed for constipation   magnesium hydroxide (MILK OF MAGNESIA) 400 mg/5 mL oral suspension   Yes Yes   Sig: Take 30 mL by mouth as needed for constipation (if no BM on day 3)   metoprolol tartrate (LOPRESSOR) 25 mg tablet   Yes Yes   Sig: Take 25 mg by mouth 2 (two) times a day   pantoprazole (PROTONIX) 40 mg   Yes Yes   Sig: Take 40 mg by mouth daily   simvastatin (ZOCOR) 20 mg tablet   Yes Yes   Sig: Take 20 mg by mouth daily at bedtime   sodium chloride (OCEAN) 0 65 % nasal spray   Yes Yes   Si spray into each nostril 4 (four) times a day   tamsulosin (FLOMAX) 0 4 mg   Yes Yes   Sig: Take 0 4 mg by mouth daily with dinner   traZODone (DESYREL) 50 mg tablet   Yes Yes   Sig: Take 50 mg by mouth daily at bedtime   zolpidem (AMBIEN) 5 mg tablet   Yes Yes   Sig: Take 5 mg by mouth daily at bedtime      Facility-Administered Medications: None       Past Medical History:   Diagnosis Date    AICD (automatic cardioverter/defibrillator) present     Aortic stenosis     Arthritis     CHF (congestive heart failure) (HCC)     Constipation     Dependent on walker for ambulation     Diabetes mellitus (HCC)     NIDDM - on no meds presently    Hyperlipidemia     Hypertension     Lumbar back pain     Lumbar spondylosis     Myocardial infarction     MI X2    Stage 3a chronic kidney disease     Wears glasses        Past Surgical History:   Procedure Laterality Date    CARDIAC DEFIBRILLATOR PLACEMENT      CARDIAC DEFIBRILLATOR PLACEMENT      CARPAL TUNNEL RELEASE      CATARACT EXTRACTION Bilateral     HERNIA REPAIR      RHIZOTOMY Right 3/16/2017    Procedure: RHIZOTOMY LUMBAR,RADIO FREQUENCY LESIONING L3-4 L4-5 L5-S1;  Surgeon: Esthela Rizvi MD;  Location: [facility];  Service:        History reviewed   No pertinent family history  I have reviewed and agree with the history as documented  Social History   Substance Use Topics    Smoking status: Never Smoker    Smokeless tobacco: Never Used    Alcohol use No        Review of Systems   Constitutional: Positive for appetite change  Negative for activity change and fever  HENT: Negative for congestion and rhinorrhea  Respiratory: Negative for choking and shortness of breath  Cardiovascular: Positive for leg swelling  Negative for chest pain  Gastrointestinal: Positive for abdominal distention, diarrhea, nausea and vomiting  Negative for abdominal pain  Genitourinary: Negative for difficulty urinating  Musculoskeletal: Positive for back pain (chronic)  Skin: Negative for rash  Neurological: Negative for dizziness, weakness, light-headedness and headaches  Psychiatric/Behavioral: Negative for confusion  All other systems reviewed and are negative  Physical Exam  ED Triage Vitals [02/27/18 1256]   Temperature Pulse Respirations Blood Pressure SpO2   97 6 °F (36 4 °C) 70 (!) 24 142/72 94 %      Temp Source Heart Rate Source Patient Position - Orthostatic VS BP Location FiO2 (%)   Temporal Monitor Lying Left arm --      Pain Score       No Pain           Orthostatic Vital Signs  Vitals:    02/27/18 1256 02/27/18 1539 02/27/18 1730 02/27/18 1939   BP: 142/72 90/52 118/58 123/66   Pulse: 70 64 67 60   Patient Position - Orthostatic VS: Lying          Physical Exam   Constitutional: He appears well-developed and well-nourished  No distress  HENT:   Head: Normocephalic and atraumatic  Right Ear: External ear normal    Left Ear: External ear normal    Dry oropharynx   Eyes: EOM are normal  Pupils are equal, round, and reactive to light  Right eye exhibits no discharge  Left eye exhibits no discharge  Neck: Normal range of motion  Neck supple  Cardiovascular: Normal rate, regular rhythm and intact distal pulses      Pulmonary/Chest: Effort normal  No respiratory distress  Distant BS clear   Abdominal: Soft  There is no tenderness  There is no guarding  Hypoactive BS   Genitourinary: Rectal exam shows guaiac negative stool  Genitourinary Comments:  chaparoned by Ignacio Meyers RN circ male no hernia bilaterally brown formed stool heme negative controls intact   Musculoskeletal: Normal range of motion  He exhibits edema  He exhibits no deformity  1+ pretibial edema sl erythema of lower extremity   Lymphadenopathy:     He has no cervical adenopathy  Neurological: He is alert  No cranial nerve deficit or sensory deficit  He exhibits normal muscle tone  Coordination normal    Oriented to self and hospital   Skin: Skin is warm and dry  Capillary refill takes less than 2 seconds  Psychiatric: He has a normal mood and affect  Vitals reviewed        ED Medications  Medications   ciprofloxacin (CIPRO) IVPB (premix) 400 mg (not administered)   sodium chloride 0 9 % bolus 500 mL (0 mL Intravenous Stopped 2/27/18 1630)   ondansetron (ZOFRAN) injection 4 mg (4 mg Intravenous Given 2/27/18 1321)   sodium chloride 0 9 % bolus 1,000 mL (1,000 mL Intravenous New Bag 2/27/18 1854)       Diagnostic Studies  Results Reviewed     Procedure Component Value Units Date/Time    Blood culture #1 [[id number]] Collected:  02/27/18 2001    Lab Status:  No result Specimen:  Blood from Arm, Left     Blood culture #2 [[id number]] Collected:  02/27/18 2001    Lab Status:  No result Specimen:  Blood from Arm, Right     Urine Microscopic [[id number]]  (Abnormal) Collected:  02/27/18 1631    Lab Status:  Final result Specimen:  Urine from Urine, Clean Catch Updated:  02/27/18 1722     RBC, UA 1-2 (A) /hpf      WBC, UA 4-10 (A) /hpf      Epithelial Cells Occasional /hpf      Bacteria, UA Occasional /hpf     UA w Reflex to Microscopic w Reflex to Culture [[id number]]  (Abnormal) Collected:  02/27/18 1631    Lab Status:  Final result Specimen:  Urine from Urine, Clean Catch Updated:  02/27/18 1646 Color, UA Yellow     Clarity, UA Clear     Specific Gravity, UA 1 015     pH, UA 5 5     Leukocytes, UA Trace (A)     Nitrite, UA Negative     Protein, UA Negative mg/dl      Glucose, UA Negative mg/dl      Ketones, UA Negative mg/dl      Urobilinogen, UA 0 2 E U /dl      Bilirubin, UA Negative     Blood, UA Negative    Troponin I [[id number]]  (Abnormal) Collected:  02/27/18 1321    Lab Status:  Final result Specimen:  Blood from Arm, Left Updated:  02/27/18 1418     Troponin I 0 05 (HH) ng/mL     Narrative:         [employer] Chemistry analyzer 99% cutoff is > 0 04 ng/mL in network labs    o cTnI 99% cutoff is useful only when applied to patients in the clinical setting of myocardial ischemia  o cTnI 99% cutoff should be interpreted in the context of clinical history, ECG findings and possibly cardiac imaging to establish correct diagnosis  o cTnI 99% cutoff may be suggestive but clearly not indicative of a coronary event without the clinical setting of myocardial ischemia      Protime-INR [[id number]]  (Abnormal) Collected:  02/27/18 1321    Lab Status:  Final result Specimen:  Blood from Arm, Left Updated:  02/27/18 1413     Protime 15 6 (H) seconds      INR 1 25 (H)    Comprehensive metabolic panel [[id number]]  (Abnormal) Collected:  02/27/18 1321    Lab Status:  Final result Specimen:  Blood from Arm, Left Updated:  02/27/18 1352     Sodium 142 mmol/L      Potassium 4 9 mmol/L      Chloride 105 mmol/L      CO2 28 mmol/L      Anion Gap 9 mmol/L      BUN 53 (H) mg/dL      Creatinine 1 99 (H) mg/dL      Glucose 147 (H) mg/dL      Calcium 8 8 mg/dL      AST 24 U/L      ALT 20 U/L      Alkaline Phosphatase 93 U/L      Total Protein 7 4 g/dL      Albumin 3 3 (L) g/dL      Total Bilirubin 0 60 mg/dL      eGFR 29 ml/min/1 73sq m     Narrative:         National Kidney Disease Education Program recommendations are as follows:  GFR calculation is accurate only with a steady state creatinine  Chronic Kidney disease less than 60 ml/min/1 73 sq  meters  Kidney failure less than 15 ml/min/1 73 sq  meters  Lipase [[id number]]  (Abnormal) Collected:  02/27/18 1321    Lab Status:  Final result Specimen:  Blood from Arm, Left Updated:  02/27/18 1352     Lipase 69 (L) u/L     CBC and differential [[id number]]  (Abnormal) Collected:  02/27/18 1321    Lab Status:  Final result Specimen:  Blood from Arm, Left Updated:  02/27/18 1328     WBC 4 03 (L) Thousand/uL      RBC 4 50 Million/uL      Hemoglobin 11 3 (L) g/dL      Hematocrit 37 8 %      MCV 84 fL      MCH 25 1 (L) pg      MCHC 29 9 (L) g/dL      RDW 19 8 (H) %      MPV 10 7 fL      Platelets 249 (L) Thousands/uL      Neutrophils Relative 46 %      Lymphocytes Relative 13 (L) %      Monocytes Relative 40 (H) %      Eosinophils Relative 1 %      Basophils Relative 0 %      Neutrophils Absolute 1 85 Thousands/µL      Lymphocytes Absolute 0 53 (L) Thousands/µL      Monocytes Absolute 1 60 (H) Thousand/µL      Eosinophils Absolute 0 04 Thousand/µL      Basophils Absolute 0 01 Thousands/µL                  XR chest 1 view portable   Final Result by Genoveva Rodriguez MD (02/27 1846)      1  Cardiomegaly and pulmonary vascular congestion  2   Probable small left pleural effusion and atelectasis or consolidation in the left lower lobe  Workstation performed: [id number]         CT abdomen pelvis wo contrast   Final Result by Naif Elena MD (02/27 1813)      6 mm calculus in the distal left ureter just proximal to the UVJ resulting in mild left-sided hydroureter but no significant hydronephrosis  Bilateral pleural effusions, right greater than left with atelectatic versus consolidative change at the left base  Thick-walled bladder  Could reflect cystitis versus sequela chronic outlet obstruction  Mild abdominal ascites           Workstation performed: [id number]                    Procedures  ECG 12 Lead Documentation  Date/Time: 2/27/2018 1:36 PM  Performed by: Celia Canas  Authorized by: Celia Canas     Indications / Diagnosis:  Vomiting  ECG reviewed by me, the ED Provider: yes    Patient location:  ED  Previous ECG:     Previous ECG:  Unavailable  Rate:     ECG rate:  69    ECG rate assessment: normal    Rhythm:     Rhythm: paced    QRS:     QRS axis:  Left  Comments:      venticular paced rhythm           Phone Contacts  ED Phone Contact    ED Course  ED Course          HEART Risk Score    Flowsheet Row Most Recent Value   History  0 Filed at: 02/27/2018 2007   ECG  1 Filed at: 02/27/2018 2007   Age  2 Filed at: 02/27/2018 2007   Risk Factors  2 Filed at: 02/27/2018 2007   Troponin  1 Filed at: 02/27/2018 2007   Heart Score Risk Calculator   History  0 Filed at: 02/27/2018 2007   ECG  1 Filed at: 02/27/2018 2007   Age  2 Filed at: 02/27/2018 2007   Risk Factors  2 Filed at: 02/27/2018 2007   Troponin  1 Filed at: 02/27/2018 2007   HEART Score  6 Filed at: 02/27/2018 2007   HEART Score  6 Filed at: 02/27/2018 [address] [place] [address]  Number of Diagnoses or Management Options  Acute kidney injury superimposed on chronic kidney disease ([facility] [address] ):   Elevated troponin I level:   Ureterolithiasis:   UTI (urinary tract infection):   Diagnosis management comments: Mdm: colitis, pancreatitis, electrolyte abnormality, acs    CritCare Time    Disposition  Final diagnoses:   Ureterolithiasis - Left prox to UVJ 6mm nonobstructing   UTI (urinary tract infection)   Acute kidney injury superimposed on chronic kidney disease (HCC)   Elevated troponin I level     Time reflects when diagnosis was documented in both MDM as applicable and the Disposition within this note     Time User Action Codes Description Comment    2/27/2018  7:21 PM Braydon Davis Add [N20 0] Kidney stone     2/27/2018  7:30 PM [address] [place] [place] [N20 1] Ureterolithiasis     2/27/2018  7:30 PM Shawn Tolliver Modify [N20 1] Ureterolithiasis Rt prox to UVJ 6mm nonobstructing    2/27/2018 7:31 PM Cassandra Loera Modify [N20 1] Ureterolithiasis Left prox to UVJ 6mm nonobstructing    2/27/2018  7:31 PM Bridgett Carlos Add [N39 0] UTI (urinary tract infection)     2/27/2018  7:32 PM Bridgett Carlos Add [N17 9,  N18 9] Acute kidney injury superimposed on chronic kidney disease ([facility] [address] )     2/27/2018  7:32 PM Bridgett Carlos Add [R74 8] Elevated troponin I level       ED Disposition     ED Disposition Condition Comment    Admit  Case discuseed with Benja Jean full admit to Dr Vernona Angelucci    None       Patient's Medications   Discharge Prescriptions    No medications on file     No discharge procedures on file      ED Provider  Electronically Signed by           Joya Vivas MD  02/27/18 2017

## 2018-02-27 NOTE — LETTER
March 8, 2018        Assessment:     Principal Problem:    Acute on chronic systolic CHF (congestive heart failure) (Formerly [facility])  Active Problems:    Kidney stone    CKD (chronic kidney disease), stage III    COPD (chronic obstructive pulmonary disease) (Former[facility])    Hyperlipidemia    Essential hypertension    Type 2 diabetes mellitus (HCC)    Elevated troponin I level    Acute kidney injury ([facility] [address] )    Ureterolithiasis    Thrombocytopenia (HCC)    Leukopenia    Non-ST elevation myocardial infarction (NSTEMI), type 2 (HCC)    Severe aortic stenosis    MRSA colonization    Elevated TSH    Acute cystitis    Pulmonary hypertension    Mitral regurgitation    Aortic regurgitation    Hypothyroidism        Plan:     · Acute on chronic systolic CHF with EF of 15%/IMIMKO aortic stenosis/Mitral regurgitation/Aortic regurgitation/Pulmonary hypertension: I&O's continue to remain inaccurate and [place] catheter fell off multiple times  The patient has had a decrease in weight  Will continue IV lasix 40 mg BID  Appreciate Cardiology's input  · Ureterolithiasis/Acute kidney injury(POA)/Chronic kidney disease stage 3: mild improvement of renal function today  Continue to monitor closely while diuresing  Urinary retention protocol with bladder scans every shift  Appreciate nephrology's input  · Acute cystitis secondary to proteus mirabilis: patient completed full antibiotic course and repeat UA is negative  · Hypothyroidism/Elevated TSH: continue increased dose of levothyroxine 150 mcg PO Qdaily in the early morning  Recheck TSH level in 4-6 weeks  · MRSA colonization: patient completed 5 days of Nasal Bactroban  He will need MRSA de-colonization with Hibiclens after discharge          VTE Pharmacologic Prophylaxis:   Pharmacologic: Heparin  Mechanical VTE Prophylaxis in Place:  Yes     Discussions with Specialists or Other Care Team Provider: Nursing, CM, and attending     Education and Discussions with Family / Patient: n/a     Time Spent for Care: 30 minutes  More than 50% of total time spent on counseling and coordination of care as described above      Current Length of Stay: 9 day(s)     Current Patient Status: Inpatient   Certification Statement: The patient will continue to require additional inpatient hospital stay due to continued need for diuresis        Code Status: Level 1 - Full Code        Subjective: The patient has been seen and examined  The patient states he is sleepy  He denies any pain      Objective:      Vitals:   Temp (24hrs), Av 8 °F (36 °C), Min:96 4 °F (35 8 °C), Max:97 2 °F (36 2 °C)     HR:  [63-65] 64  Resp:  [18-24] 20  BP: (121-127)/(60-73) 121/70  SpO2:  [86 %-100 %] 99 %  Body mass index is 30 01 kg/m²       Input and Output Summary (last 24 hours):         Intake/Output Summary (Last 24 hours) at 18 1158  Last data filed at 18 1101    Gross per 24 hour   Intake              460 ml   Output              351 ml   Net              109 ml         Physical Exam:      Physical Exam   Constitutional: Vital signs are normal  He appears well-developed and well-nourished  He is active and cooperative  Cardiovascular: Normal rate and regular rhythm  Murmur heard  Pulmonary/Chest: Effort normal  He has no wheezes  He has no rhonchi  He has rales in the right middle field, the right lower field, the left middle field and the left lower field  Abdominal: Soft  Normal appearance and bowel sounds are normal  There is no tenderness  Neurological: He is alert  No cranial nerve deficit  Skin: Skin is warm, dry and intact     Nursing note and vitals reviewed            Additional Data:      Labs:        Results from last 7 days  Lab Units 18  0515 18  0456   WBC Thousand/uL 4 74 4 74   HEMOGLOBIN g/dL 9 9* 10 0*   HEMATOCRIT % 33 4* 33 8*   PLATELETS Thousands/uL 122* 131*   NEUTROS PCT %  --  52   LYMPHS PCT %  --  17   LYMPHO PCT % 13*  --    MONOS PCT %  --  30*   MONO PCT MAN % 10  --    EOS PCT %  --  1   EOSINO PCT MANUAL % 0  --          Results from last 7 days  Lab Units 03/08/18  0515   SODIUM mmol/L 139   POTASSIUM mmol/L 4 9   CHLORIDE mmol/L 104   CO2 mmol/L 26   BUN mg/dL 50*   CREATININE mg/dL 1 66*   CALCIUM mg/dL 8 1*   TOTAL PROTEIN g/dL 6 5   BILIRUBIN TOTAL mg/dL 0 60   ALK PHOS U/L 88   ALT U/L 20   AST U/L 18   GLUCOSE RANDOM mg/dL 103         Results from last 7 days  Lab Units 03/03/18  0416   INR   1 29*         * I Have Reviewed All Lab Data Listed Above  * Additional Pertinent Lab Tests Reviewed:  All Labs Within Last 24 Hours Reviewed     Imaging:     Imaging Reports Reviewed Today Include: chest x-ray  Imaging Personally Reviewed by Myself Includes:  none     Recent Cultures (last 7 days):             Last 24 Hours Medication List:      Current Facility-Administered Medications:  acetaminophen 650 mg Oral Q6H PRN [employer], DO   ALPRAZolam 0 25 mg Oral BID PRN Arlene Rose PA-C   aspirin 81 mg Oral Daily [employer], DO   atorvastatin 40 mg Oral Daily With Clarence Verdin, DO   b complex-vitamin C-folic acid 1 capsule Oral Daily With Dinner Sherman Vogel PA-C   budesonide-formoterol 2 puff Inhalation BID Sherman Vogel PA-C   fluticasone 1 spray Nasal Daily [employer], DO   furosemide 40 mg Intravenous BID (diuretic) [employer], DO   heparin (porcine) 5,000 Units Subcutaneous Q8H [facility] Sherman Vogel PA-C   insulin lispro 1-6 Units Subcutaneous TID AC Sherman Vogel PA-C   insulin lispro 1-6 Units Subcutaneous HS Sherman Vogel PA-C   levalbuterol 1 25 mg Nebulization Q6H PRN [employer], DO   levothyroxine 150 mcg Oral Early Morning Jerry Ochoa, DO   melatonin 3 mg Oral HS [employer], DO   metoprolol tartrate 25 mg Oral BID [employer], DO   ondansetron 4 mg Intravenous Q6H PRN Sherman Vogel PA-C   sodium chloride 3 mL Nebulization Q6H PRN [employer], DO   tamsulosin 0 4 mg Oral Daily With Omnshital Vogel PA-C   traZODone 50 mg Oral HS Sherman Vogel PA-C         Today, Patient Was Seen By: Bogdan Mckeon PA-C     ** Please Note: This note has been constructed using a voice recognition system   **            Revision History                                Progress Notes:

## 2018-02-28 ENCOUNTER — APPOINTMENT (INPATIENT)
Dept: ULTRASOUND IMAGING | Facility: HOSPITAL | Age: 83
DRG: 693 | End: 2018-02-28
Payer: COMMERCIAL

## 2018-02-28 ENCOUNTER — APPOINTMENT (INPATIENT)
Dept: NON INVASIVE DIAGNOSTICS | Facility: HOSPITAL | Age: 83
DRG: 693 | End: 2018-02-28
Payer: COMMERCIAL

## 2018-02-28 LAB
ALBUMIN SERPL BCP-MCNC: 2.6 G/DL (ref 3.5–5)
ALP SERPL-CCNC: 74 U/L (ref 46–116)
ALT SERPL W P-5'-P-CCNC: 18 U/L (ref 12–78)
ANION GAP SERPL CALCULATED.3IONS-SCNC: 11 MMOL/L (ref 4–13)
APTT PPP: 40 SECONDS (ref 23–35)
AST SERPL W P-5'-P-CCNC: 22 U/L (ref 5–45)
BILIRUB SERPL-MCNC: 0.5 MG/DL (ref 0.2–1)
BUN SERPL-MCNC: 47 MG/DL (ref 5–25)
CALCIUM SERPL-MCNC: 8 MG/DL (ref 8.3–10.1)
CHLORIDE SERPL-SCNC: 110 MMOL/L (ref 100–108)
CK SERPL-CCNC: 73 U/L (ref 39–308)
CO2 SERPL-SCNC: 26 MMOL/L (ref 21–32)
CREAT SERPL-MCNC: 1.78 MG/DL (ref 0.6–1.3)
CREAT UR-MCNC: 90 MG/DL
ERYTHROCYTE [DISTWIDTH] IN BLOOD BY AUTOMATED COUNT: 19.3 % (ref 11.6–15.1)
EST. AVERAGE GLUCOSE BLD GHB EST-MCNC: 114 MG/DL
GFR SERPL CREATININE-BSD FRML MDRD: 33 ML/MIN/1.73SQ M
GLUCOSE SERPL-MCNC: 132 MG/DL (ref 65–140)
GLUCOSE SERPL-MCNC: 219 MG/DL (ref 65–140)
GLUCOSE SERPL-MCNC: 44 MG/DL (ref 65–140)
GLUCOSE SERPL-MCNC: 63 MG/DL (ref 65–140)
GLUCOSE SERPL-MCNC: 66 MG/DL (ref 65–140)
GLUCOSE SERPL-MCNC: 86 MG/DL (ref 65–140)
GLUCOSE SERPL-MCNC: 93 MG/DL (ref 65–140)
HBA1C MFR BLD: 5.6 % (ref 4.2–6.3)
HCT VFR BLD AUTO: 32.9 % (ref 36.5–49.3)
HGB BLD-MCNC: 9.6 G/DL (ref 12–17)
INR PPP: 1.39 (ref 0.86–1.16)
LACTATE SERPL-SCNC: 0.7 MMOL/L (ref 0.5–2)
MAGNESIUM SERPL-MCNC: 2 MG/DL (ref 1.6–2.6)
MCH RBC QN AUTO: 25 PG (ref 26.8–34.3)
MCHC RBC AUTO-ENTMCNC: 29.2 G/DL (ref 31.4–37.4)
MCV RBC AUTO: 86 FL (ref 82–98)
NT-PROBNP SERPL-MCNC: ABNORMAL PG/ML
PHOSPHATE SERPL-MCNC: 4.6 MG/DL (ref 2.3–4.1)
PLATELET # BLD AUTO: 119 THOUSANDS/UL (ref 149–390)
PMV BLD AUTO: 10.3 FL (ref 8.9–12.7)
POTASSIUM SERPL-SCNC: 4.2 MMOL/L (ref 3.5–5.3)
PROT SERPL-MCNC: 6 G/DL (ref 6.4–8.2)
PROT UR-MCNC: 21 MG/DL
PROT/CREAT UR: 0.23 MG/G{CREAT} (ref 0–0.1)
PROTHROMBIN TIME: 17 SECONDS (ref 12.1–14.4)
RBC # BLD AUTO: 3.84 MILLION/UL (ref 3.88–5.62)
SODIUM 24H UR-SCNC: 25 MOL/L
SODIUM SERPL-SCNC: 147 MMOL/L (ref 136–145)
TROPONIN I SERPL-MCNC: 0.07 NG/ML
TSH SERPL DL<=0.05 MIU/L-ACNC: 7.12 UIU/ML (ref 0.36–3.74)
WBC # BLD AUTO: 3.1 THOUSAND/UL (ref 4.31–10.16)

## 2018-02-28 PROCEDURE — G8987 SELF CARE CURRENT STATUS: HCPCS

## 2018-02-28 PROCEDURE — 93306 TTE W/DOPPLER COMPLETE: CPT

## 2018-02-28 PROCEDURE — 97167 OT EVAL HIGH COMPLEX 60 MIN: CPT

## 2018-02-28 PROCEDURE — 84484 ASSAY OF TROPONIN QUANT: CPT | Performed by: PHYSICIAN ASSISTANT

## 2018-02-28 PROCEDURE — 80053 COMPREHEN METABOLIC PANEL: CPT | Performed by: PHYSICIAN ASSISTANT

## 2018-02-28 PROCEDURE — 99222 1ST HOSP IP/OBS MODERATE 55: CPT | Performed by: INTERNAL MEDICINE

## 2018-02-28 PROCEDURE — 84443 ASSAY THYROID STIM HORMONE: CPT | Performed by: INTERNAL MEDICINE

## 2018-02-28 PROCEDURE — 82948 REAGENT STRIP/BLOOD GLUCOSE: CPT

## 2018-02-28 PROCEDURE — G8978 MOBILITY CURRENT STATUS: HCPCS | Performed by: PHYSICAL THERAPIST

## 2018-02-28 PROCEDURE — 82550 ASSAY OF CK (CPK): CPT | Performed by: INTERNAL MEDICINE

## 2018-02-28 PROCEDURE — 76770 US EXAM ABDO BACK WALL COMP: CPT

## 2018-02-28 PROCEDURE — 97163 PT EVAL HIGH COMPLEX 45 MIN: CPT | Performed by: PHYSICAL THERAPIST

## 2018-02-28 PROCEDURE — 85027 COMPLETE CBC AUTOMATED: CPT | Performed by: PHYSICIAN ASSISTANT

## 2018-02-28 PROCEDURE — 85730 THROMBOPLASTIN TIME PARTIAL: CPT | Performed by: PHYSICIAN ASSISTANT

## 2018-02-28 PROCEDURE — G8979 MOBILITY GOAL STATUS: HCPCS | Performed by: PHYSICAL THERAPIST

## 2018-02-28 PROCEDURE — G8988 SELF CARE GOAL STATUS: HCPCS

## 2018-02-28 PROCEDURE — 83605 ASSAY OF LACTIC ACID: CPT | Performed by: INTERNAL MEDICINE

## 2018-02-28 PROCEDURE — 99222 1ST HOSP IP/OBS MODERATE 55: CPT | Performed by: UROLOGY

## 2018-02-28 PROCEDURE — 97116 GAIT TRAINING THERAPY: CPT | Performed by: PHYSICAL THERAPIST

## 2018-02-28 PROCEDURE — 97530 THERAPEUTIC ACTIVITIES: CPT

## 2018-02-28 PROCEDURE — 83036 HEMOGLOBIN GLYCOSYLATED A1C: CPT | Performed by: PHYSICIAN ASSISTANT

## 2018-02-28 PROCEDURE — 93970 EXTREMITY STUDY: CPT

## 2018-02-28 PROCEDURE — 85610 PROTHROMBIN TIME: CPT | Performed by: PHYSICIAN ASSISTANT

## 2018-02-28 PROCEDURE — 84100 ASSAY OF PHOSPHORUS: CPT | Performed by: PHYSICIAN ASSISTANT

## 2018-02-28 PROCEDURE — 93306 TTE W/DOPPLER COMPLETE: CPT | Performed by: INTERNAL MEDICINE

## 2018-02-28 PROCEDURE — 83735 ASSAY OF MAGNESIUM: CPT | Performed by: PHYSICIAN ASSISTANT

## 2018-02-28 PROCEDURE — 99232 SBSQ HOSP IP/OBS MODERATE 35: CPT | Performed by: PHYSICIAN ASSISTANT

## 2018-02-28 RX ORDER — LEVALBUTEROL 1.25 MG/.5ML
1.25 SOLUTION, CONCENTRATE RESPIRATORY (INHALATION) EVERY 6 HOURS PRN
Status: DISCONTINUED | OUTPATIENT
Start: 2018-02-28 | End: 2018-03-12 | Stop reason: HOSPADM

## 2018-02-28 RX ORDER — SODIUM CHLORIDE FOR INHALATION 0.9 %
3 VIAL, NEBULIZER (ML) INHALATION EVERY 6 HOURS PRN
Status: DISCONTINUED | OUTPATIENT
Start: 2018-02-28 | End: 2018-03-12 | Stop reason: HOSPADM

## 2018-02-28 RX ORDER — DEXTROSE MONOHYDRATE 50 MG/ML
125 INJECTION, SOLUTION INTRAVENOUS ONCE
Status: COMPLETED | OUTPATIENT
Start: 2018-02-28 | End: 2018-02-28

## 2018-02-28 RX ADMIN — FLUTICASONE PROPIONATE 1 SPRAY: 50 SPRAY, METERED NASAL at 09:21

## 2018-02-28 RX ADMIN — CEFTRIAXONE 1000 MG: 1 INJECTION, SOLUTION INTRAVENOUS at 09:17

## 2018-02-28 RX ADMIN — DEXTROSE 125 ML/HR: 5 SOLUTION INTRAVENOUS at 09:28

## 2018-02-28 RX ADMIN — Medication 1 CAPSULE: at 16:06

## 2018-02-28 RX ADMIN — HEPARIN SODIUM 5000 UNITS: 5000 INJECTION, SOLUTION INTRAVENOUS; SUBCUTANEOUS at 21:39

## 2018-02-28 RX ADMIN — ACETAMINOPHEN 650 MG: 325 TABLET, FILM COATED ORAL at 11:53

## 2018-02-28 RX ADMIN — INSULIN LISPRO 2 UNITS: 100 INJECTION, SOLUTION INTRAVENOUS; SUBCUTANEOUS at 21:39

## 2018-02-28 RX ADMIN — BUDESONIDE AND FORMOTEROL FUMARATE DIHYDRATE 2 PUFF: 160; 4.5 AEROSOL RESPIRATORY (INHALATION) at 20:05

## 2018-02-28 RX ADMIN — TRAZODONE HYDROCHLORIDE 50 MG: 50 TABLET ORAL at 21:39

## 2018-02-28 RX ADMIN — BUDESONIDE AND FORMOTEROL FUMARATE DIHYDRATE 2 PUFF: 160; 4.5 AEROSOL RESPIRATORY (INHALATION) at 09:22

## 2018-02-28 RX ADMIN — HEPARIN SODIUM 5000 UNITS: 5000 INJECTION, SOLUTION INTRAVENOUS; SUBCUTANEOUS at 14:32

## 2018-02-28 RX ADMIN — LEVOTHYROXINE SODIUM 125 MCG: 125 TABLET ORAL at 05:08

## 2018-02-28 RX ADMIN — TAMSULOSIN HYDROCHLORIDE 0.4 MG: 0.4 CAPSULE ORAL at 16:06

## 2018-02-28 RX ADMIN — ATORVASTATIN CALCIUM 40 MG: 40 TABLET, FILM COATED ORAL at 16:06

## 2018-02-28 NOTE — PROGRESS NOTES
Progress Note - Zohra Gay 80 y o  male MRN: [id number]    Unit/Bed#: 411-01 Encounter: [id number]      Assessment:  Patient Active Problem List   Diagnosis    Kidney stone    Abdominal pain    Stage 3 chronic kidney disease    COPD (chronic obstructive pulmonary disease) (HCC)    Hyperlipidemia    Essential hypertension    Type 2 diabetes mellitus (HCC)    Systolic congestive heart failure (HCC)    Elevated troponin I level    Acute kidney injury superimposed on chronic kidney disease (HCC)    Ureterolithiasis    Thrombocytopenia (HCC)    Leukopenia    Non-ST elevation myocardial infarction (NSTEMI), type 2 ([facility] [address] )         Plan:  1) Ureterolithiasis L distal ureter - plan for close monitoring  Strain all urine if possible  Patient seems comfortable by exam  Follow renal function  Follow up urine cultures  2) Chronic systolic CHF - advanced heart failure does not seem to be decompensated per cardiology  Restart diuretics tomorrow  Echocardiogram results pending  3) Acute kidney injury on CKD stage 3 / hydronephrosis - creatinine is trending down but not quite at baseline  Continue to hold diuretics  1 liter of D5 NS given per nephrology recommendations  4) COPD - stable, continue respiratory protocol  Symbicort  5) Hypertension - continue metoprolol  6) Hyperlipidemia - continue statin  7)  NSTEMI type 2 -  likely in the setting of chronic CHF, RADAMES        Subjective:   Patient seen and examined  He is not sure why he is here  States he feels fine  Patient is confused at baseline  Objective:   Vitals: Blood pressure 108/58, pulse 71, temperature (!) 97 1 °F (36 2 °C), temperature source Temporal, resp  rate 18, height 5' 5" (1 651 m), weight 80 2 kg (176 lb 12 9 oz), SpO2 92 %  ,Body mass index is 29 42 kg/m²    Weight (last 2 days)     Date/Time   Weight    02/28/18 0600  80 2 (176 81)    02/27/18 2227  80 4 (177 25)    02/27/18 2113  80 4 (177 25)    02/27/18 1256  82 9 (182 8) Intake/Output Summary (Last 24 hours) at 02/28/18 1507  Last data filed at 02/28/18 1412   Gross per 24 hour   Intake             1060 ml   Output              500 ml   Net              560 ml       Physical Exam:   General:  NAD, awake, alert, confused to place and time  HEENT:  NC/AT, MMM  Neck:  Supple, No JVD  CV:  + S1, +S2, RRR  Pulm: Rales at left base  Effort limited  Abd:  Soft, Non-tender, Non-distended  BS active x 4  Ext:  3+ pitting edema of the BLE       Scheduled Meds:  Current Facility-Administered Medications:  acetaminophen 650 mg Oral Q6H PRN Unruly Palma,     aspirin 81 mg Oral Daily Unruly Palma, DO    atorvastatin 40 mg Oral Daily With Clarence Verdin,     b complex-vitamin C-folic acid 1 capsule Oral Daily With Dinner Sherman Vogel PA-C    budesonide-formoterol 2 puff Inhalation BID Sherman Vogel PA-C    cefTRIAXone 1,000 mg Intravenous Q24H Unruly Palma DO Last Rate: 1,000 mg (02/28/18 0917)   fluticasone 1 spray Nasal Daily Unruly Palma DO    heparin (porcine) 5,000 Units Subcutaneous Q8H [facility] Sherman Vogel PA-C    insulin lispro 1-6 Units Subcutaneous TID AC Sherman Vogel PA-C    insulin lispro 1-6 Units Subcutaneous HS Sherman Vogel PA-C    levalbuterol 1 25 mg Nebulization Q6H PRN Unruly Palma DO    levothyroxine 125 mcg Oral Early Morning Sherman Vogel PA-C    metoprolol tartrate 25 mg Oral BID Unruly Palma,     ondansetron 4 mg Intravenous Q6H PRN Sherman Vogel PA-C    sodium chloride 3 mL Nebulization Q6H PRN Unruly Palma DO    tamsulosin 0 4 mg Oral Daily With Dinner Sherman Vogel PA-C    traZODone 50 mg Oral HS Sherman Vogel PA-C      Continuous Infusions:   PRN Meds:   acetaminophen    levalbuterol    ondansetron    sodium chloride      Invasive Devices     Peripheral Intravenous Line            Peripheral IV 02/27/18 Left Forearm 1 day                  Results from last 7 days  Lab Units 02/28/18  0450 02/27/18  2493 02/27/18  1321   WBC Thousand/uL 3 10*  --  4 03*   HEMOGLOBIN g/dL 9 6*  --  11 3*   HEMATOCRIT % 32 9*  --  37 8   PLATELETS Thousands/uL 119* 109* 139*   NEUTROS PCT %  --   --  46   MONOS PCT %  --   --  40*         Results from last 7 days  Lab Units 02/28/18  0450 02/27/18  1321   SODIUM mmol/L 147* 142   POTASSIUM mmol/L 4 2 4 9   CHLORIDE mmol/L 110* 105   CO2 mmol/L 26 28   BUN mg/dL 47* 53*   CREATININE mg/dL 1 78* 1 99*   CALCIUM mg/dL 8 0* 8 8   TOTAL PROTEIN g/dL 6 0* 7 4   BILIRUBIN TOTAL mg/dL 0 50 0 60   ALK PHOS U/L 74 93   ALT U/L 18 20   AST U/L 22 24   GLUCOSE RANDOM mg/dL 44* 147*       Lab Results   Component Value Date    CKTOTAL 73 02/28/2018    TROPONINI 0 07 (HH) 02/28/2018       Lab Results   Component Value Date    INR 1 39 (H) 02/28/2018    INR 1 25 (H) 02/27/2018    PROTIME 17 0 (H) 02/28/2018    PROTIME 15 6 (H) 02/27/2018                 Lab, Imaging and other studies: I have personally reviewed pertinent reports      VTE Pharmacologic Prophylaxis: Heparin  VTE Mechanical Prophylaxis: sequential compression device

## 2018-02-28 NOTE — RESPIRATORY THERAPY NOTE
RT Protocol Note  Crystal Georges 80 y o  male MRN: [id number]  Unit/Bed#: 411-01 Encounter: [id number]    Assessment    Principal Problem:    Ureterolithiasis  Active Problems:    Abdominal pain    Stage 3 chronic kidney disease    COPD (chronic obstructive pulmonary disease) ([facility])    Hyperlipidemia    Essential hypertension    Type 2 diabetes mellitus (HCC)    Systolic congestive heart failure (HCC)    Elevated troponin I level    Acute kidney injury superimposed on chronic kidney disease ([facility])    Thrombocytopenia ([facility])    Leukopenia    Non-ST elevation myocardial infarction (NSTEMI), type 2 ([facility] [address] )      Home Pulmonary Medications:  No respiratory medicines    Past Medical History:   Diagnosis Date    AICD (automatic cardioverter/defibrillator) present     Aortic stenosis     Arthritis     CHF (congestive heart failure) ([facility])     Constipation     Dependent on walker for ambulation     Diabetes mellitus ([facility])     NIDDM - on no meds presently    Hyperlipidemia     Hypertension     Lumbar back pain     Lumbar spondylosis     Myocardial infarction     MI X2    Stage 3a chronic kidney disease     Wears glasses      Social History     Social History    Marital status:       Spouse name: N/A    Number of children: N/A    Years of education: N/A     Social History Main Topics    Smoking status: Never Smoker    Smokeless tobacco: Never Used    Alcohol use No    Drug use: No    Sexual activity: Not Asked     Other Topics Concern    None     Social History Narrative    None       Subjective    Subjective Data: patient grunts acknowlegments     Objective  Prn bronchodialator therapy  Physical Exam:   Assessment Type: Assess only  General Appearance: Eyes open/responds to voice, Awake  Respiratory Pattern: Normal  Chest Assessment: Chest expansion symmetrical  Bilateral Breath Sounds: Clear, Diminished  Cough: Dry  O2 Device: room air    Vitals:  Blood pressure 109/64, pulse 62, temperature (!) 97 1 °F (36 2 °C), temperature source Temporal, resp  rate 18, height 5' 5" (1 651 m), weight 80 4 kg (177 lb 4 oz), SpO2 95 %  Imaging and other studies: I have personally reviewed pertinent reports  O2 Device: room air     Plan    Respiratory Plan: No distress/Pulmonary history        Resp Comments: patient not verbalizing well, but did follow command when asked to roll to side to listen to him, in no resp   distress at this time

## 2018-02-28 NOTE — CONSULTS
Consultation - Nephrology   Anna Bennett 80 y o  male MRN: [id number]  Unit/Bed#: 411-01 Encounter: [id number]    A/P:  1  RADAMES on top of CKD    Patient's cause of RADAMES a little difficult to ascertain at this time  He has mild hydronephrosis and we cannot blame the entirety of RADAMES on this finding, there may be a small component of reduced renal function due to this anatomic issue  His history would suggest a volume depleted state with n-v-d, however, on objective physical exam, radiologic and by BNP he appears slightly volume overloaded at this time  I would continue to address possible  infection (cystitis), ceftriaxone a safer and more appropriate medication  Continue with supportive care, I will check urine studies at this time for completeness sake of work up  2  CKD III with baseline Cr ~1 4 mg/dL  3  Hypernatremia   I will order for D5W as the patient is NPO for possible procedure at this time  4  HTN + CKD + CHF -patient appears comfortable he may be experiencing mild exacerbation of CHF at this time, will defer to cardiology if active diuresis indicated at this time  5  Cystitis    Follow up cultures, continue with ceftriaxone  6  Nephrolithiasis with mild right sided hydroureteronephrosis   Defer to urology regarding need for procedural intervention  Thank you for allowing us to participate in the care of your patient  Please feel free to contact us regarding the care of this patient, or any other questions/concerns that may be applicable      Patient Active Problem List   Diagnosis    Kidney stone    Abdominal pain    Stage 3 chronic kidney disease    COPD (chronic obstructive pulmonary disease) (HCC)    Hyperlipidemia    Essential hypertension    Type 2 diabetes mellitus (HCC)    Systolic congestive heart failure (HCC)    Elevated troponin I level    Acute kidney injury superimposed on chronic kidney disease (HCC)    Ureterolithiasis    Thrombocytopenia ([facility] [address] )    Leukopenia    Non-ST elevation myocardial infarction (NSTEMI), type 2 ([address] )       History of Present Illness   Physician Requesting Consult: Dilshad Singh DO  Reason for Consult / Principal Problem: RADAMES  Hx and PE limited by:   HPI: Yokasta Ortega is a 80y o  year old male who presented with complaints of n-v-d that began abruptly the day prior to presentation  Patient is a resident of [facility]  Patient was evaluated and noted to have a mild hydronephrosis due to a kidney stone and RADAMES and was admitted for further evaluation  From the renal standpoint, patient has a baseline Cr ~1 4 mg/dL  He presented with a Cr of ~2 mg/dL and this has improved down to 1 8 mg/dL this AM   Other than a mild hypernatremia, patient does not have any electrolyte abnormalities  He was given about 1 5L of NS bolus since presentation, no current maintenance IVF  He denies flank pain  Patient also denies use of NSAIDS  He has a history of AS and CHF  History obtained from chart review and the patient    Review of Systems - Negative except as mentioned above      Historical Information   Past Medical History:   Diagnosis Date    AICD (automatic cardioverter/defibrillator) present     Aortic stenosis     Arthritis     CHF (congestive heart failure) (HCC)     Constipation     Dependent on walker for ambulation     Diabetes mellitus (HCC)     NIDDM - on no meds presently    Hyperlipidemia     Hypertension     Lumbar back pain     Lumbar spondylosis     Myocardial infarction     MI X2    Stage 3a chronic kidney disease     Wears glasses      Past Surgical History:   Procedure Laterality Date    CARDIAC DEFIBRILLATOR PLACEMENT      CARDIAC DEFIBRILLATOR PLACEMENT      CARPAL TUNNEL RELEASE      CATARACT EXTRACTION Bilateral     HERNIA REPAIR      RHIZOTOMY Right 3/16/2017    Procedure: RHIZOTOMY LUMBAR,RADIO FREQUENCY LESIONING L3-4 L4-5 L5-S1;  Surgeon: Zoraida Dumas MD;  Location: [facility]; Service:      Social History   History   Alcohol Use No     History   Drug Use No     History   Smoking Status    Never Smoker   Smokeless Tobacco    Never Used     History reviewed  No pertinent family history      Meds/Allergies   all current active meds have been reviewed, current meds: Current Facility-Administered Medications   Medication Dose Route Frequency    acetaminophen (TYLENOL) tablet 650 mg  650 mg Oral Q6H PRN    aspirin chewable tablet 81 mg  81 mg Oral Daily    atorvastatin (LIPITOR) tablet 40 mg  40 mg Oral Daily With Dinner    b complex-vitamin C-folic acid (NEPHROCAPS) capsule 1 capsule  1 capsule Oral Daily With Dinner    budesonide-formoterol (SYMBICORT) 160-4 5 mcg/act inhaler 2 puff  2 puff Inhalation BID    cefTRIAXone (ROCEPHIN) IVPB (premix) 1,000 mg  1,000 mg Intravenous Q24H    fluticasone (FLONASE) 50 mcg/act nasal spray 1 spray  1 spray Nasal Daily    heparin (porcine) subcutaneous injection 5,000 Units  5,000 Units Subcutaneous Q8H [address]    insulin lispro (HumaLOG) 100 units/mL subcutaneous injection 1-6 Units  1-6 Units Subcutaneous TID AC    insulin lispro (HumaLOG) 100 units/mL subcutaneous injection 1-6 Units  1-6 Units Subcutaneous HS    levalbuterol (XOPENEX) inhalation solution 1 25 mg  1 25 mg Nebulization Q6H PRN    levothyroxine tablet 125 mcg  125 mcg Oral Early Morning    metoprolol tartrate (LOPRESSOR) tablet 25 mg  25 mg Oral BID    ondansetron (ZOFRAN) injection 4 mg  4 mg Intravenous Q6H PRN    sodium chloride 0 9 % inhalation solution 3 mL  3 mL Nebulization Q6H PRN    tamsulosin (FLOMAX) capsule 0 4 mg  0 4 mg Oral Daily With Dinner    traZODone (DESYREL) tablet 50 mg  50 mg Oral HS    and PTA meds:  Prescriptions Prior to Admission   Medication    acetaminophen (TYLENOL) 325 mg tablet    aspirin 325 mg tablet    B Complex-C-Folic Acid (DAVIDE CAPS) 1 MG CAPS    bisacodyl (BISCOLAX) 10 mg suppository    budesonide-formoterol (SYMBICORT) 160-4 5 mcg/act inhaler    bumetanide (BUMEX) 1 mg tablet    cholecalciferol (VITAMIN D3) 1,000 units tablet    finasteride (PROSCAR) 5 mg tablet    fluticasone (FLONASE) 50 mcg/act nasal spray    glimepiride (AMARYL) 2 mg tablet    hydrOXYzine HCL (ATARAX) 25 mg tablet    insulin aspart (NovoLOG) 100 units/mL injection    levothyroxine 125 mcg tablet    lubiprostone (AMITIZA) 24 mcg capsule    magnesium hydroxide (MILK OF MAGNESIA) 400 mg/5 mL oral suspension    metoprolol tartrate (LOPRESSOR) 25 mg tablet    pantoprazole (PROTONIX) 40 mg    simvastatin (ZOCOR) 20 mg tablet    sodium chloride (OCEAN) 0 65 % nasal spray    Sodium Phosphates (FLEET ENEMA RE)    tamsulosin (FLOMAX) 0 4 mg    traZODone (DESYREL) 50 mg tablet    zolpidem (AMBIEN) 5 mg tablet         No Known Allergies    Objective     Intake/Output Summary (Last 24 hours) at 02/28/18 0854  Last data filed at 02/28/18 0754   Gross per 24 hour   Intake             1060 ml   Output              250 ml   Net              810 ml       Invasive Devices:        Physical Exam      I/O last 3 completed shifts: In: 1060 [I V :60; IV Piggyback:1000]  Out: -     Vitals:    02/28/18 0715   BP: 109/60   Pulse: 71   Resp: 18   Temp: (!) 97 1 °F (36 2 °C)   SpO2: 96%       Gen: in NAD, Alert/Awake  HEENT: no sclerous icterus, MMM, neck supple  CV: +S1/S2, RRR  Lungs: CTA bilaterally  Abd: +BS, soft NT/ND  Ext: all four extremities are warm, +2 bilateral LE edema  Skin: no rashes noted  Neuro: CN II-XII intact    Current Weight: Weight - Scale: 80 2 kg (176 lb 12 9 oz)  First Weight: Weight - Scale: 82 9 kg (182 lb 12 8 oz)    Lab Results:  I have personally reviewed pertinent labs      CBC: Lab Results   Component Value Date    WBC 3 10 (L) 02/28/2018    HGB 9 6 (L) 02/28/2018    HCT 32 9 (L) 02/28/2018    MCV 86 02/28/2018     (L) 02/28/2018    MCH 25 0 (L) 02/28/2018    MCHC 29 2 (L) 02/28/2018    RDW 19 3 (H) 02/28/2018    MPV 10 3 02/28/2018 CMP: Lab Results   Component Value Date     (H) 02/28/2018    K 4 2 02/28/2018     (H) 02/28/2018    CO2 26 02/28/2018    ANIONGAP 11 02/28/2018    BUN 47 (H) 02/28/2018    CREATININE 1 78 (H) 02/28/2018    GLUCOSE 44 (L) 02/28/2018    CALCIUM 8 0 (L) 02/28/2018    AST 22 02/28/2018    ALT 18 02/28/2018    ALKPHOS 74 02/28/2018    PROT 6 0 (L) 02/28/2018    BILITOT 0 50 02/28/2018    EGFR 33 02/28/2018     Phosphorus:   Lab Results   Component Value Date    PHOS 4 6 (H) 02/28/2018     Magnesium:   Lab Results   Component Value Date    MG 2 0 02/28/2018     Urinalysis: Lab Results   Component Value Date    COLORU Yellow 02/27/2018    CLARITYU Clear 02/27/2018    SPECGRAV 1 015 02/27/2018    PHUR 5 5 02/27/2018    LEUKOCYTESUR Trace (A) 02/27/2018    NITRITE Negative 02/27/2018    PROTEINUA Negative 02/27/2018    GLUCOSEU Negative 02/27/2018    KETONESU Negative 02/27/2018    BILIRUBINUR Negative 02/27/2018    BLOODU Negative 02/27/2018     Ionized Calcium: No results found for: CAION  Coagulation:   Lab Results   Component Value Date    INR 1 39 (H) 02/28/2018     Troponin:   Lab Results   Component Value Date    TROPONINI 0 07 (HH) 02/28/2018     ABG: No results found for: PHART, UIW6ISE, PO2ART, LTI5KBA, K5ALPIIX, BEART, SOURCE      Results from last 7 days  Lab Units 02/28/18  0450 02/27/18  1321   SODIUM mmol/L 147* 142   POTASSIUM mmol/L 4 2 4 9   CHLORIDE mmol/L 110* 105   CO2 mmol/L 26 28   BUN mg/dL 47* 53*   CREATININE mg/dL 1 78* 1 99*   CALCIUM mg/dL 8 0* 8 8   TOTAL PROTEIN g/dL 6 0* 7 4   BILIRUBIN TOTAL mg/dL 0 50 0 60   ALK PHOS U/L 74 93   ALT U/L 18 20   AST U/L 22 24   GLUCOSE RANDOM mg/dL 44* 147*       Radiology review:  Procedure: Ct Abdomen Pelvis Wo Contrast    Result Date: 2/27/2018  Narrative: CT ABDOMEN AND PELVIS WITHOUT IV CONTRAST INDICATION: diarhea abdm distention  History taken directly from the electronic ordering system  COMPARISON: None   TECHNIQUE:  CT examination of the abdomen and pelvis was performed without intravenous contrast   Axial, sagittal, and coronal 2D reformatted images were created from the source data and submitted for interpretation  Radiation dose length product (DLP) for this visit:  721 9 mGy-cm   This examination, like all CT scans performed in the [facility], was performed utilizing techniques to minimize radiation dose exposure, including the use of iterative reconstruction and automated exposure control  Enteric contrast was not administered  FINDINGS: ABDOMEN LOWER CHEST:  Bilateral pleural effusions, right greater than left  Atelectatic versus consolidative changes at the left base  LIVER/BILIARY TREE:  Within limitations of noncontrast, no focal lesion  GALLBLADDER:  No calcified gallstones  No pericholecystic inflammatory change  SPLEEN:  Unremarkable  PANCREAS:  Much of the pancreas is atrophic  ADRENAL GLANDS:  Unremarkable  KIDNEYS/URETERS:  Simple appearing left renal cysts  6 mm catheter in the distal left ureter just proximal to the UVJ  Mild left hydroureter without substantial hydronephrosis  Punctate calculus in the superior pole of the left kidney  STOMACH AND BOWEL:  No evidence for bowel obstruction  Diverticulosis  No clear evidence for diverticulitis or colitis  APPENDIX:  No findings to suggest appendicitis  ABDOMINOPELVIC CAVITY:  Mild abdominal ascites  Scattered lymph nodes in the retroperitoneum which are nonspecific  VESSELS:  Atherosclerotic changes are present  No evidence of aneurysm  PELVIS REPRODUCTIVE ORGANS:  Prostatomegaly URINARY BLADDER:  Bladder is slightly thick-walled  Correlate with UA for cystitis in the possibility of chronic bladder outlet obstruction  ABDOMINAL WALL/INGUINAL REGIONS:  Anasarca  OSSEOUS STRUCTURES:  No acute fracture or destructive osseous lesion       Impression: 6 mm calculus in the distal left ureter just proximal to the UVJ resulting in mild left-sided hydroureter but no significant hydronephrosis  Bilateral pleural effusions, right greater than left with atelectatic versus consolidative change at the left base  Thick-walled bladder  Could reflect cystitis versus sequela chronic outlet obstruction  Mild abdominal ascites  Workstation performed: [id number]     Procedure: Xr Chest 1 View Portable    Result Date: 2/27/2018  Narrative: CHEST INDICATION:  Shortness of breath  diarrhea COMPARISON:  October 4, 2017  EXAM PERFORMED/VIEWS:  XR CHEST PORTABLE FINDINGS: Heart enlarged  ICD present  Pulmonary vessels prominent and indistinct  Opacification in the lower left hemithorax, probably a combination of pleural effusion and atelectasis or consolidation in the left lower lobe  Right lung clear  No pneumothorax  Osseous structures appear within normal limits for patient age  Impression: 1  Cardiomegaly and pulmonary vascular congestion  2   Probable small left pleural effusion and atelectasis or consolidation in the left lower lobe   Workstation performed: [id number]             Guillermo Cole DO

## 2018-02-28 NOTE — PLAN OF CARE
Problem: Potential for Falls  Goal: Patient will remain free of falls  INTERVENTIONS:  - Assess patient frequently for physical needs  -  Identify cognitive and physical deficits and behaviors that affect risk of falls    -  Jackson fall precautions as indicated by assessment   - Educate patient/family on patient safety including physical limitations  - Instruct patient to call for assistance with activity based on assessment  - Modify environment to reduce risk of injury  - Consider OT/PT consult to assist with strengthening/mobility   Outcome: Progressing      Problem: Prexisting or High Potential for Compromised Skin Integrity  Goal: Skin integrity is maintained or improved  INTERVENTIONS:  - Identify patients at risk for skin breakdown  - Assess and monitor skin integrity  - Assess and monitor nutrition and hydration status  - Monitor labs (i e  albumin)  - Assess for incontinence   - Turn and reposition patient  - Assist with mobility/ambulation  - Relieve pressure over bony prominences  - Avoid friction and shearing  - Provide appropriate hygiene as needed including keeping skin clean and dry  - Evaluate need for skin moisturizer/barrier cream  - Collaborate with interdisciplinary team (i e  Nutrition, Rehabilitation, etc )   - Patient/family teaching   Outcome: Progressing      Problem: PAIN - ADULT  Goal: Verbalizes/displays adequate comfort level or baseline comfort level  Interventions:  - Encourage patient to monitor pain and request assistance  - Assess pain using appropriate pain scale  - Administer analgesics based on type and severity of pain and evaluate response  - Implement non-pharmacological measures as appropriate and evaluate response  - Consider cultural and social influences on pain and pain management  - Notify physician/advanced practitioner if interventions unsuccessful or patient reports new pain  Outcome: Progressing      Problem: INFECTION - ADULT  Goal: Absence or prevention of progression during hospitalization  INTERVENTIONS:  - Assess and monitor for signs and symptoms of infection  - Monitor lab/diagnostic results  - Monitor all insertion sites, i e  indwelling lines, tubes, and drains  - Monitor endotracheal (as able) and nasal secretions for changes in amount and color  - Collins appropriate cooling/warming therapies per order  - Administer medications as ordered  - Instruct and encourage patient and family to use good hand hygiene technique  - Identify and instruct in appropriate isolation precautions for identified infection/condition  Outcome: Progressing      Problem: SAFETY ADULT  Goal: Maintain or return to baseline ADL function  INTERVENTIONS:  -  Assess patient's ability to carry out ADLs; assess patient's baseline for ADL function and identify physical deficits which impact ability to perform ADLs (bathing, care of mouth/teeth, toileting, grooming, dressing, etc )  - Assess/evaluate cause of self-care deficits   - Assess range of motion  - Assess patient's mobility; develop plan if impaired  - Assess patient's need for assistive devices and provide as appropriate  - Encourage maximum independence but intervene and supervise when necessary  ¯ Involve family in performance of ADLs  ¯ Assess for home care needs following discharge   ¯ Request OT consult to assist with ADL evaluation and planning for discharge  ¯ Provide patient education as appropriate  Outcome: Progressing    Goal: Maintain or return mobility status to optimal level  INTERVENTIONS:  - Assess patient's baseline mobility status (ambulation, transfers, stairs, etc )    - Identify cognitive and physical deficits and behaviors that affect mobility  - Identify mobility aids required to assist with transfers and/or ambulation (gait belt, sit-to-stand, lift, walker, cane, etc )  - Collins fall precautions as indicated by assessment  - Record patient progress and toleration of activity level on Mobility SBAR; progress patient to next Phase/Stage  - Instruct patient to call for assistance with activity based on assessment  - Request Rehabilitation consult to assist with strengthening/weightbearing, etc   Outcome: Progressing      Problem: DISCHARGE PLANNING  Goal: Discharge to home or other facility with appropriate resources  INTERVENTIONS:  - Identify barriers to discharge w/patient and caregiver  - Arrange for needed discharge resources and transportation as appropriate  - Identify discharge learning needs (meds, wound care, etc )  - Arrange for interpretive services to assist at discharge as needed  - Refer to Case Management Department for coordinating discharge planning if the patient needs post-hospital services based on physician/advanced practitioner order or complex needs related to functional status, cognitive ability, or social support system  Outcome: Progressing      Problem: Knowledge Deficit  Goal: Patient/family/caregiver demonstrates understanding of disease process, treatment plan, medications, and discharge instructions  Complete learning assessment and assess knowledge base    Interventions:  - Provide teaching at level of understanding  - Provide teaching via preferred learning methods  Outcome: Progressing

## 2018-02-28 NOTE — SOCIAL WORK
Pt was admitted from [facility]  CM spoke with Jessenia Sunshine in admissions dept re: bedhold status  Jessenia Sunshine has to reach out to pt's family to see if they want to pay to hold the bed and she will call CM back

## 2018-02-28 NOTE — CONSULTS
Consultation - Cardiology   Mikala Patel 80 y o  male MRN: [id number]  Unit/Bed#: 36-80 Encounter: [id number]    Consults      Physician Requesting Consult: Rebekah Monroy, DO  Reason for Consult / Principal Problem:  CHF    History of Present Illness   HPI: Mikala Patel is a 80y o  year old male who has a history of coronary artery disease with PCI in 2012, ischemic cardiomyopathy last ejection fraction 40-45%, severe aortic stenosis, chronic combined systolic and diastolic congestive heart failure, hypertension, status post ICD presents with a chief complaint of diarrhea from the nursing facility  The patient has some encephalopathy and is unable to provide any significant history or details into his health  I talked with his daughter who knows him quite well  She states over the past several months he has had multiple hospital admissions for decompensated congestive heart failure  He has declined significantly in the past several months  He is now living at the nursing facility and has not recovered since then  Per report to the nursing facility he had several day history of diarrhea and loose stools  They were concerned and recommended admission  He has chronic lower extremity edema which has been present for several months  There is no comment on whether his weights were going upper down  Per review of [facility] as office records his weight is were running around 180 lb and he is 176 today  He also has a ureteral stone and bladder wall thickening question of bladder outlet obstruction has been raised  Urology is going to see the patient  He was recommended evaluation for transcatheter aortic valve replacement family is concerned about the feasibility of this procedure given the patient's deconditioned state he has not yet been to see cardiothoracic surgery  Review of Systems   Constitution: Negative  HENT: Negative  Eyes: Negative  Cardiovascular: Negative  Respiratory: Negative  Endocrine: Negative  Hematologic/Lymphatic: Negative  Skin: Negative  Musculoskeletal: Negative  Gastrointestinal: Positive for diarrhea  Genitourinary: Negative  Neurological: Negative  Psychiatric/Behavioral: Negative  Historical Information   Past Medical History:   Diagnosis Date    AICD (automatic cardioverter/defibrillator) present     Aortic stenosis     Arthritis     CHF (congestive heart failure) (HCC)     Constipation     Dependent on walker for ambulation     Diabetes mellitus (HCC)     NIDDM - on no meds presently    Hyperlipidemia     Hypertension     Lumbar back pain     Lumbar spondylosis     Myocardial infarction     MI X2    Stage 3a chronic kidney disease     Wears glasses      Past Surgical History:   Procedure Laterality Date    CARDIAC DEFIBRILLATOR PLACEMENT      CARDIAC DEFIBRILLATOR PLACEMENT      CARPAL TUNNEL RELEASE      CATARACT EXTRACTION Bilateral     HERNIA REPAIR      RHIZOTOMY Right 3/16/2017    Procedure: RHIZOTOMY LUMBAR,RADIO FREQUENCY LESIONING L3-4 L4-5 L5-S1;  Surgeon: Faby Oates MD;  Location: [facility] OR;  Service:      History   Alcohol Use No     History   Drug Use No     History   Smoking Status    Never Smoker   Smokeless Tobacco    Never Used     Family History: non-contributory    Meds/Allergies   all current active meds have been reviewed       No Known Allergies    Objective   Vitals: Blood pressure 108/58, pulse 71, temperature (!) 97 1 °F (36 2 °C), temperature source Temporal, resp  rate 18, height 5' 5" (1 651 m), weight 80 2 kg (176 lb 12 9 oz), SpO2 96 %  , Body mass index is 29 42 kg/m² , Orthostatic Blood Pressures    Flowsheet Row Most Recent Value   Blood Pressure  108/58 filed at 02/28/2018 0919   Patient Position - Orthostatic VS  Lying filed at 02/28/2018 3403        Systolic (63NRP), JLS:530 , Min:90 , VVC:430     Diastolic (60MRQ), KSR:97, Min:52, Max:72      Intake/Output Summary (Last 24 hours) at 02/28/18 0933  Last data filed at 02/28/18 0754   Gross per 24 hour   Intake             1060 ml   Output              250 ml   Net              810 ml       Weight (last 2 days)     Date/Time   Weight    02/28/18 0600  80 2 (176 81)    02/27/18 2227  80 4 (177 25)    02/27/18 2113  80 4 (177 25)    02/27/18 1256  82 9 (182 8)              Invasive Devices     Peripheral Intravenous Line            Peripheral IV 02/27/18 Left Forearm less than 1 day                  Physical Exam   Constitutional: No distress  HENT:   Head: Atraumatic  Eyes: Conjunctivae are normal  Pupils are equal, round, and reactive to light  Neck: Neck supple  Cardiovascular: Normal rate, regular rhythm and S1 normal   Exam reveals no friction rub  Murmur heard  Systolic murmur is present with a grade of 2/6   Pulmonary/Chest: Effort normal  No respiratory distress  He has decreased breath sounds  He has no wheezes  He has no rhonchi  He has no rales  Abdominal: Bowel sounds are normal  He exhibits no distension  There is no tenderness  There is no rebound  Musculoskeletal: Normal range of motion  He exhibits edema  Neurological: He is alert  No cranial nerve deficit  Skin: Skin is warm and dry  No erythema  Nursing note and vitals reviewed            Laboratory Results:    Results from last 7 days  Lab Units 02/28/18  0450 02/28/18  0207 02/27/18 2239 02/27/18  1321   CK TOTAL U/L 73  --   --   --    TROPONIN I ng/mL  --  0 07* 0 06* 0 05*       CBC with diff:   Results from last 7 days  Lab Units 02/28/18  0450 02/27/18 2239 02/27/18  1321   WBC Thousand/uL 3 10*  --  4 03*   HEMOGLOBIN g/dL 9 6*  --  11 3*   HEMATOCRIT % 32 9*  --  37 8   MCV fL 86  --  84   PLATELETS Thousands/uL 119* 109* 139*   MCH pg 25 0*  --  25 1*   MCHC g/dL 29 2*  --  29 9*   RDW % 19 3*  --  19 8*   MPV fL 10 3 9 5 10 7         CMP:  Results from last 7 days  Lab Units 02/28/18  0450 02/27/18  1321   SODIUM mmol/L 147* 142   POTASSIUM mmol/L 4 2 4 9   CHLORIDE mmol/L 110* 105   CO2 mmol/L 26 28   ANION GAP mmol/L 11 9   BUN mg/dL 47* 53*   CREATININE mg/dL 1 78* 1 99*   GLUCOSE RANDOM mg/dL 44* 147*   CALCIUM mg/dL 8 0* 8 8   AST U/L 22 24   ALT U/L 18 20   ALK PHOS U/L 74 93   TOTAL PROTEIN g/dL 6 0* 7 4   BILIRUBIN TOTAL mg/dL 0 50 0 60   EGFR ml/min/1 73sq m 33 29         BMP:  Results from last 7 days  Lab Units 02/28/18  0450 02/27/18  1321   SODIUM mmol/L 147* 142   POTASSIUM mmol/L 4 2 4 9   CHLORIDE mmol/L 110* 105   CO2 mmol/L 26 28   BUN mg/dL 47* 53*   CREATININE mg/dL 1 78* 1 99*   GLUCOSE RANDOM mg/dL 44* 147*   CALCIUM mg/dL 8 0* 8 8       BNP:  No results for input(s): BNP in the last 72 hours  Magnesium:   Results from last 7 days  Lab Units 02/28/18  0450   MAGNESIUM mg/dL 2 0       Coags:   Results from last 7 days  Lab Units 02/28/18  0450 02/27/18  1321   PTT seconds 40*  --    INR  1 39* 1 25*       TSH:       Hemoglobin A1C       Lipid Profile:         Cardiac testing:   No results found for this or any previous visit  No results found for this or any previous visit  No results found for this or any previous visit  No results found for this or any previous visit  Imaging: I have personally reviewed pertinent reports  Ct Abdomen Pelvis Wo Contrast    Result Date: 2/27/2018  Narrative: CT ABDOMEN AND PELVIS WITHOUT IV CONTRAST INDICATION: diarhea abdm distention  History taken directly from the electronic ordering system  COMPARISON: None  TECHNIQUE:  CT examination of the abdomen and pelvis was performed without intravenous contrast   Axial, sagittal, and coronal 2D reformatted images were created from the source data and submitted for interpretation  Radiation dose length product (DLP) for this visit:  721 9 mGy-cm     This examination, like all CT scans performed in the [facility], was performed utilizing techniques to minimize radiation dose exposure, including the use of iterative reconstruction and automated exposure control  Enteric contrast was not administered  FINDINGS: ABDOMEN LOWER CHEST:  Bilateral pleural effusions, right greater than left  Atelectatic versus consolidative changes at the left base  LIVER/BILIARY TREE:  Within limitations of noncontrast, no focal lesion  GALLBLADDER:  No calcified gallstones  No pericholecystic inflammatory change  SPLEEN:  Unremarkable  PANCREAS:  Much of the pancreas is atrophic  ADRENAL GLANDS:  Unremarkable  KIDNEYS/URETERS:  Simple appearing left renal cysts  6 mm catheter in the distal left ureter just proximal to the UVJ  Mild left hydroureter without substantial hydronephrosis  Punctate calculus in the superior pole of the left kidney  STOMACH AND BOWEL:  No evidence for bowel obstruction  Diverticulosis  No clear evidence for diverticulitis or colitis  APPENDIX:  No findings to suggest appendicitis  ABDOMINOPELVIC CAVITY:  Mild abdominal ascites  Scattered lymph nodes in the retroperitoneum which are nonspecific  VESSELS:  Atherosclerotic changes are present  No evidence of aneurysm  PELVIS REPRODUCTIVE ORGANS:  Prostatomegaly URINARY BLADDER:  Bladder is slightly thick-walled  Correlate with UA for cystitis in the possibility of chronic bladder outlet obstruction  ABDOMINAL WALL/INGUINAL REGIONS:  Anasarca  OSSEOUS STRUCTURES:  No acute fracture or destructive osseous lesion  Impression: 6 mm calculus in the distal left ureter just proximal to the UVJ resulting in mild left-sided hydroureter but no significant hydronephrosis  Bilateral pleural effusions, right greater than left with atelectatic versus consolidative change at the left base  Thick-walled bladder  Could reflect cystitis versus sequela chronic outlet obstruction  Mild abdominal ascites  Workstation performed: [id number]     Xr Chest 1 View Portable    Result Date: 2/27/2018  Narrative: CHEST INDICATION:  Shortness of breath    diarrhea COMPARISON:  October 4, 2017  EXAM PERFORMED/VIEWS:  XR CHEST PORTABLE FINDINGS: Heart enlarged  ICD present  Pulmonary vessels prominent and indistinct  Opacification in the lower left hemithorax, probably a combination of pleural effusion and atelectasis or consolidation in the left lower lobe  Right lung clear  No pneumothorax  Osseous structures appear within normal limits for patient age  Impression: 1  Cardiomegaly and pulmonary vascular congestion  2   Probable small left pleural effusion and atelectasis or consolidation in the left lower lobe  Workstation performed: [id number]       EKG reviewed personally:  V pacing  Telemetry reviewed personally:  No significant events    Assessment:  Principal Problem:    Ureterolithiasis  Active Problems:    Abdominal pain    Stage 3 chronic kidney disease    COPD (chronic obstructive pulmonary disease) (HCC)    Hyperlipidemia    Essential hypertension    Type 2 diabetes mellitus (HCC)    Systolic congestive heart failure (HCC)    Elevated troponin I level    Acute kidney injury superimposed on chronic kidney disease (HCC)    Thrombocytopenia (HCC)    Leukopenia    Non-ST elevation myocardial infarction (NSTEMI), type 2 ([address] )      Assesment/ Plan:  1  Chronic combined systolic and diastolic congestive heart failure  2  Severe aortic stenosis  3  Coronary artery disease stable without angina  4  Ureterolithiasis with possible obstruction  5  Diarrhea  6  Minimal troponin elevation secondary to underlying coronary disease  7  Metabolic encephalopathy  8  Deconditioning    Recommendations: The patient has advanced chronic heart failure secondary to severe valvular heart disease  He does not appear to be acutely decompensated  I suspect the pleural effusions and lower extremity edema or chronic his weight is today are actually less than his outpatient office weights  He has had diarrhea for several days has not been able to keep much food down    I would resume his home diuretic dosing when he is cleared from a urologic standpoint that he does not have obstruction  Outpatient evaluation for transcatheter aortic valve replacement [facility]  Will review echocardiogram today            Code Status: Level 1 - Full Code

## 2018-02-28 NOTE — SPEECH THERAPY NOTE
Speech Language/Pathology    Consult received and chart reviewed  Eval deferred per PA-C  Patient to continue NPO for now pending possible sx   Will follow when cleared for eval

## 2018-02-28 NOTE — SOCIAL WORK
Call back from Emilia Joyner in admissions dept at [facility] who spoke with pt's family and pt is not a bedhold [facility] is willing to reaccept pt on dc (but Cm will need to obtain authorization with Marissa Luna, and SNF will need readmission paperwork completed)

## 2018-02-28 NOTE — CASE MANAGEMENT
Initial Clinical Review    Admission: Date/Time/Statement: 2/27/18 @ 1935     Orders Placed This Encounter   Procedures    Inpatient Admission (expected length of stay for this patient is greater than two midnights)     Standing Status:   Standing     Number of Occurrences:   1     Order Specific Question:   Admitting Physician     Answer:   Leatha Stevens     Order Specific Question:   Level of Care     Answer:   Med Surg [16]     Order Specific Question:   Estimated length of stay     Answer:   More than 2 Midnights     Order Specific Question:   Certification     Answer:   I certify that inpatient services are medically necessary for this patient for a duration of greater than two midnights  See H&P and MD Progress Notes for additional information about the patient's course of treatment  ED: Date/Time/Mode of Arrival:   ED Arrival Information     Expected Arrival Acuity Means of Arrival Escorted By Service Admission Type    2/27/2018 11:56 2/27/2018 12:55 Urgent Stretcher [facility] General Medicine Urgent    Arrival Complaint    abdominal pain with n/v/d, low temps      Chief Complaint:   Chief Complaint   Patient presents with    Diarrhea     N/V/D since last night    History of Illness:   Zainab Arriaza is an 80-year-old male resident of [facility] presents with nausea vomiting diarrhea since yesterday and they are concerned about abdominal distension  Patient is asking for something to drink; Daughter calls in states she is being treated for C-diff  ED Vital Signs:   ED Triage Vitals [02/27/18 1256]   Temperature Pulse Respirations Blood Pressure SpO2   97 6 °F (36 4 °C) 70 (!) 24 142/72 94 %      Temp Source Heart Rate Source Patient Position - Orthostatic VS BP Location FiO2 (%)   Temporal Monitor Lying Left arm --      Pain Score       No Pain        Wt Readings from Last 1 Encounters:   02/28/18 80 2 kg (176 lb 12 9 oz)   Vital Signs (abnormal):    Abnormal Labs/Diagnostic Test Results:   WBC 4 03 HGB 11 3  BUN 53 CR 1 99 GLUC 147 ALB 3 3 GFR  29 LIPASE 69 PT 15 6 INR 1 25 BNP 19,963  TROP 0 05, 0 06  U/A+LEUK  CXR=1  Cardiomegaly and pulmonary vascular congestion  2   Probable small left pleural effusion and atelectasis or consolidation in the left lower lobe  CT A/P=6 mm calculus in the distal left ureter just proximal to the UVJ resulting in mild left-sided hydroureter but no significant hydronephrosis  Bilateral pleural effusions, right greater than left with atelectatic versus consolidative change at the left base  Thick-walled bladder  Could reflect cystitis versus sequela chronic outlet obstruction  Mild abdominal ascites  EKG=ECG rate:  69    ECG rate assessment: normal    Rhythm:     Rhythm: paced    QRS:     QRS axis:  Left  Comments:      venticular paced rhythm  ED Treatment:   Medication Administration from 02/27/2018 1156 to 02/27/2018 2057       Date/Time Order Dose Route Action Action by Comments     02/27/2018 1630 sodium chloride 0 9 % bolus 500 mL 0 mL Intravenous Stopped Agustin Temple RN      02/27/2018 1320 sodium chloride 0 9 % bolus 500 mL 500 mL Intravenous New Bag Rylee Ardeen Dryer, RN      02/27/2018 1321 ondansetron (ZOFRAN) injection 4 mg 4 mg Intravenous Given Rylee Ardeen Dryer, RN      02/27/2018 1854 sodium chloride 0 9 % bolus 1,000 mL 1,000 mL Intravenous New Bag Rylee Ardeen Dryer, RN      02/27/2018 2050 ciprofloxacin (CIPRO) IVPB (premix) 400 mg 400 mg Intravenous New Bag Agustin Temple RN       Past Medical/Surgical History:    Active Ambulatory Problems     Diagnosis Date Noted    Kidney stone 02/27/2018     Resolved Ambulatory Problems     Diagnosis Date Noted    No Resolved Ambulatory Problems     Past Medical History:   Diagnosis Date    AICD (automatic cardioverter/defibrillator) present     Aortic stenosis     Arthritis     [facility]     Constipation     Dependent on walker for ambulation     Diabetes mellitus ([facility] [address] )     Hyperlipidemia     Hypertension     Lumbar back pain     Lumbar spondylosis     Myocardial infarction     Stage 3a chronic kidney disease     Wears glasses    Admitting Diagnosis: Kidney stone [N20 0]  Ureterolithiasis [N20 1]  UTI (urinary tract infection) [N39 0]  Abdominal pain [R10 9]  Elevated troponin I level [R74 8]  Acute kidney injury superimposed on chronic kidney disease ([facility] [address] ) [N17 9, N18 9]  Age/Sex: 80 y o  male  Assessment/Plan:   Principal Problem:    Ureterolithiasis  Active Problems:    Elevated troponin I level    Acute kidney injury superimposed on chronic kidney disease (HCC)    Stage 3 chronic kidney disease    COPD (chronic obstructive pulmonary disease) ([facility])    Essential hypertension    Type 2 diabetes mellitus (HCC)    Systolic congestive heart failure (HCC)    Abdominal pain    Hyperlipidemia  Plan for the Primary Problem(s):  · Ureterolithiasis  ? Present on admission  ? New finding on CT abdomen pelvis  ? 6 mm calculus in distal left ureter  ? Admitted to hospital  ? Pain management p r n   ? Continue cipro for now as recommended by urology  ? Urine, blood, MRSA cultures pending  ? Urology consult  Plan for Additional Problems:   · Elevated troponin level  ? Troponin at 0 05  ? Repeat troponin/EKG true 3 sets  ? Telemetry monitoring  · Acute kidney injury superimposed on chronic kidney disease,Stage 3 chronic kidney disease  ? Creatinine at 1 99 baseline appears to be at 1 48 dating back to 10/4/2017  ? Hold bumex pending nephrology consult  ? Nephrology consult  · COPD  ? No acute respiratory distress  ? Respiratory protocol  ? Continue Symbicort  · Essential hypertension  ? Continue metoprolol,   · Type 2 diabetes  ? No recent documented A1c  ? Glucose at 147   ? Hold oral hypoglycemics, initiate sliding scale insulin  ? Order A1c  ? Monitor glucose levels  · Systolic congestive heart failure  ? CT  shows bilateral pleural effusion right greater than left  ?  Currently being followed by [address] cardiology  ? Continue metoprolol  ? Order ECHO  ? Consider inpatient cardiology consult  · Hyperlipidemia  ? Continue statin  Admission Orders:  TELEMETRY  ACCUCHECKS WITH COVERAGE SCALE  PT/OT/ST EVAL & TX  CONSULT CARDIO  CONSULT UROLOGY  CONSULT RENAL  VENODYNES  O2 TO KEEP SATS>92%  NPO  CONTACT ISOLATION  INCENTIVE SPIROMETRY  Scheduled Meds:   Current Facility-Administered Medications:  acetaminophen 650 mg Oral Q6H PRN [employer], DO    aspirin 81 mg Oral Daily [employer], DO    atorvastatin 40 mg Oral Daily With Clarence Verdin, DO    b complex-vitamin C-folic acid 1 capsule Oral Daily With Dinner Sherman Vogel PA-C    budesonide-formoterol 2 puff Inhalation BID Sherman Vgoel PA-C    cefTRIAXone 1,000 mg Intravenous Q24H [employer], DO Last Rate: 1,000 mg (02/28/18 0917)   fluticasone 1 spray Nasal Daily [employer], DO    heparin (porcine) 5,000 Units Subcutaneous Q8H [address] Sherman Vogel PA-C    insulin lispro 1-6 Units Subcutaneous TID AC Sherman Vogel PA-C    insulin lispro 1-6 Units Subcutaneous HS Sherman Vogel PA-C    levalbuterol 1 25 mg Nebulization Q6H PRN [employer], DO    levothyroxine 125 mcg Oral Early Morning Sherman Vogel PA-C    metoprolol tartrate 25 mg Oral BID [employer], DO    ondansetron 4 mg Intravenous Q6H PRN Sherman Vogel PA-C    sodium chloride 3 mL Nebulization Q6H PRN [employer], DO    tamsulosin 0 4 mg Oral Daily With Dinner Sherman Vogel PA-C    traZODone 50 mg Oral HS Sherman Vogel PA-C      Continuous Infusions:  IVF @ 75CC/HR    PRN Meds:   acetaminophen    levalbuterol    ondansetron    sodium chloride  Thank you,  [facility] in the [facility] by Sony Giordano for 2017  Network Utilization Review Department  Phone: [phone]; Fax [phone]  ATTENTION: The Network Utilization Review Department is now centralized for our 7 Facilities   Make a note that we have a new phone and fax numbers for our Department  Please call with any questions or concerns to [phone] and carefully follow the prompts so that you are directed to the right person  All voicemails are confidential  Fax any determinations, approvals, denials, and requests for initial or continue stay review clinical to [phone]  Due to HIGH CALL volume, it would be easier if you could please send faxed requests to expedite your requests and in part, help us provide discharge notifications faster

## 2018-02-28 NOTE — H&P
History and Physical - [facility] Internal Medicine     Patient Information: Mark Clineo 80 y o  male MRN: [id number]  Unit/Bed#: 689-66 Encounter: [id number]  Admitting Physician: Jared Turner PA-C  PCP: Lynette Salomon MD  Date of Admission:  02/27/18    Assessment/Plan:    Hospital Problem List:     Principal Problem:    Ureterolithiasis  Active Problems:    Elevated troponin I level    Acute kidney injury superimposed on chronic kidney disease ([facility] )    Stage 3 chronic kidney disease    COPD (chronic obstructive pulmonary disease) ([facility] )    Essential hypertension    Type 2 diabetes mellitus ([facility] )    Systolic congestive heart failure ([facility] )    Abdominal pain    Hyperlipidemia      Plan for the Primary Problem(s):  · Ureterolithiasis  · Present on admission  · New finding on CT abdomen pelvis  · 6 mm calculus in distal left ureter  · Admitted to hospital  · Pain management p r n  · Continue cipro for now as recommended by urology  · Urine, blood, MRSA cultures pending  · Urology consult    Plan for Additional Problems:   · Elevated troponin level  · Troponin at 0 05  · Repeat troponin/EKG true 3 sets  · Telemetry monitoring  · Acute kidney injury superimposed on chronic kidney disease,Stage 3 chronic kidney disease  · Creatinine at 1 99 baseline appears to be at 1 48 dating back to 10/4/2017  · Hold bumex pending nephrology consult  · Nephrology consult  · COPD  · No acute respiratory distress  · Respiratory protocol  · Continue Symbicort  · Essential hypertension  · Continue metoprolol,   · Type 2 diabetes  · No recent documented A1c  · Glucose at 147   · Hold oral hypoglycemics, initiate sliding scale insulin  · Order A1c  · Monitor glucose levels  · Systolic congestive heart failure  · CT  shows bilateral pleural effusion right greater than left  · Currently being followed by [facility] cardiology  · Continue metoprolol  · Order ECHO  · Consider inpatient cardiology consult     · Hyperlipidemia  · Continue statin    VTE Prophylaxis: Heparin  / sequential compression device   Code Status: Level 1-full code  POLST: POLST form is not discussed and not completed at this time  Anticipated Length of Stay:  Patient will be admitted on an Inpatient basis with an anticipated length of stay of  > 2 midnights  Justification for Hospital Stay:  Ureterolithiasis, elevated troponin levels, acute kidney injury superimposed on chronic kidney disea    Chief Complaint:   Nausea, vomiting, diarrhea    History of Present Illness:    Dianna Olvera is a 80 y o  male who presents to the emergency room from [address] [place] [address] with episodes of nausea vomiting and diarrhea yesterday  Nursing home staff was concerned about abdominal distention  He had no history of fever dizziness, lightheadedness  He denies chest pain and shortness of breath  Labs showed BUN of 53, creatinine of 1 99, glucose of 147, lipase of 69, troponin 00 05, WBC of 4 03, hemoglobin of 11 3, UA shows 4-10 WBCs with occasional bacteria  Chest x-ray shows cardiomegaly and pulmonary vascular congestion  CT shows bilateral pleural effusions right greater than left  In the emergency room patient appears comfortable has no specific complaint other than wanting something to drink  The emergency room attending contacted Urology who confirm that they will see patient tomorrow morning  Patient has been admitted on inpatient status med/surg level of care for further workup and management, of ureterolithiasis, elevated troponin, acute kidney injury  Review of Systems:    Review of Systems   Constitutional: Negative for chills and fever  Respiratory: Positive for cough  Negative for chest tightness and wheezing  Cardiovascular: Positive for leg swelling  Gastrointestinal: Positive for abdominal pain, diarrhea, nausea and vomiting  Endocrine: Negative  Genitourinary: Positive for difficulty urinating  Negative for hematuria     Musculoskeletal: Negative  Skin: Negative  Allergic/Immunologic: Negative  Neurological: Negative for dizziness, syncope, weakness and headaches  Hematological: Negative  Psychiatric/Behavioral: Positive for confusion  Past Medical and Surgical History:     Past Medical History:   Diagnosis Date    AICD (automatic cardioverter/defibrillator) present     Aortic stenosis     Arthritis     CHF (congestive heart failure) (HCC)     Constipation     Dependent on walker for ambulation     Diabetes mellitus (HCC)     NIDDM - on no meds presently    Hyperlipidemia     Hypertension     Lumbar back pain     Lumbar spondylosis     Myocardial infarction     MI X2    Stage 3a chronic kidney disease     Wears glasses        Past Surgical History:   Procedure Laterality Date    CARDIAC DEFIBRILLATOR PLACEMENT      CARDIAC DEFIBRILLATOR PLACEMENT      CARPAL TUNNEL RELEASE      CATARACT EXTRACTION Bilateral     HERNIA REPAIR      RHIZOTOMY Right 3/16/2017    Procedure: RHIZOTOMY LUMBAR,RADIO FREQUENCY LESIONING L3-4 L4-5 L5-S1;  Surgeon: Blanca Swan MD;  Location: [facility];  Service:        Meds/Allergies:    Prior to Admission medications    Medication Sig Start Date End Date Taking?  Authorizing Provider   acetaminophen (TYLENOL) 325 mg tablet Take 650 mg by mouth every 6 (six) hours as needed for mild pain or fever    Historical Provider, MD   aspirin 325 mg tablet Take 325 mg by mouth daily    Historical Provider, MD SAGE Complex-C-Folic Acid (DAVIDE CAPS) 1 MG CAPS Take 1 mg by mouth daily    Historical Provider, MD   bisacodyl (BISCOLAX) 10 mg suppository Insert 10 mg into the rectum as needed for constipation (if no BM on day 4)    Historical Provider, MD   budesonide-formoterol (SYMBICORT) 160-4 5 mcg/act inhaler Inhale 2 puffs 2 (two) times a day    Historical Provider, MD   bumetanide (BUMEX) 1 mg tablet Take 1 mg by mouth 2 (two) times a day    Historical Provider, MD   cholecalciferol (VITAMIN D3) 1,000 units tablet Take 1,000 Units by mouth daily    Historical Provider, MD   finasteride (PROSCAR) 5 mg tablet Take 5 mg by mouth daily    Historical Provider, MD   fluticasone (FLONASE) 50 mcg/act nasal spray 2 sprays into each nostril daily    Historical Provider, MD   glimepiride (AMARYL) 2 mg tablet Take 2 mg by mouth daily with breakfast    Historical Provider, MD   hydrOXYzine HCL (ATARAX) 25 mg tablet Take 25 mg by mouth 2 (two) times a day    Historical Provider, MD   insulin aspart (NovoLOG) 100 units/mL injection Inject 0-12 Units under the skin 4 (four) times a day (before meals and at bedtime)    Historical Provider, MD   levothyroxine 125 mcg tablet Take 125 mcg by mouth daily    Historical Provider, MD   lubiprostone (AMITIZA) 24 mcg capsule Take 24 mcg by mouth daily as needed for constipation    Historical Provider, MD   magnesium hydroxide (MILK OF MAGNESIA) 400 mg/5 mL oral suspension Take 30 mL by mouth as needed for constipation (if no BM on day 3)    Historical Provider, MD   metoprolol tartrate (LOPRESSOR) 25 mg tablet Take 25 mg by mouth 2 (two) times a day    Historical Provider, MD   pantoprazole (PROTONIX) 40 mg Take 40 mg by mouth daily    Historical Provider, MD   simvastatin (ZOCOR) 20 mg tablet Take 20 mg by mouth daily at bedtime    Historical Provider, MD   sodium chloride (OCEAN) 0 65 % nasal spray 1 spray into each nostril 4 (four) times a day    Historical Provider, MD   Sodium Phosphates (FLEET ENEMA RE) Insert 1 enema into the rectum as needed (if no BM on day 5)    Historical Provider, MD   tamsulosin (FLOMAX) 0 4 mg Take 0 4 mg by mouth daily with dinner    Historical Provider, MD   traZODone (DESYREL) 50 mg tablet Take 50 mg by mouth daily at bedtime    Historical Provider, MD   zolpidem (AMBIEN) 5 mg tablet Take 5 mg by mouth daily at bedtime    Historical Provider, MD   albuterol (PROVENTIL HFA,VENTOLIN HFA) 90 mcg/act inhaler Inhale 2 puffs every 4 (four) hours as needed for wheezing  2/27/18  Historical Provider, MD   allopurinol (ZYLOPRIM) 100 mg tablet Take 100 mg by mouth daily  2/27/18  Historical Provider, MD   aspirin 81 MG tablet Take 81 mg by mouth daily  2/27/18  Historical Provider, MD   Calcium Carbonate (CALCIUM 600 HIGH POTENCY PO) Take 600 mg by mouth daily  2/27/18  Historical Provider, MD   DULoxetine (CYMBALTA) 30 mg delayed release capsule Take 30 mg by mouth daily  2/27/18  Historical Provider, MD   fluticasone-salmeterol (ADVAIR) 250-50 mcg/dose inhaler Inhale 1 puff 2 (two) times a day  2/27/18  Historical Provider, MD   furosemide (LASIX) 40 mg tablet Take 40 mg by mouth 2 (two) times a day  2/27/18  Historical Provider, MD   glipiZIDE (GLUCOTROL) 5 mg tablet Take 5 mg by mouth daily  2/27/18  Historical Provider, MD   indomethacin (INDOCIN) 50 mg capsule Take 50 mg by mouth 2 (two) times a day with meals  2/27/18  Historical Provider, MD   IRON PO Take 65 mg by mouth daily  2/27/18  Historical Provider, MD   Linagliptin (TRADJENTA) 5 MG TABS Take 5 mg by mouth daily  2/27/18  Historical Provider, MD   losartan (COZAAR) 50 mg tablet Take 50 mg by mouth daily  2/27/18  Historical Provider, MD   metolazone (ZAROXOLYN) 5 mg tablet Take 5 mg by mouth daily Take if weight over 200 lbs  2/27/18  Historical Provider, MD   metoprolol tartrate (LOPRESSOR) 50 mg tablet Take 50 mg by mouth 2 (two) times a day  2/27/18  Historical Provider, MD   Misc Natural Products (TART CHERRY ADVANCED PO) Take 1 tablet by mouth 2 (two) times a day  2/27/18  Historical Provider, MD   Nutritional Supplements (VITAMIN D BOOSTER PO) Take 1,000 Units by mouth daily  2/27/18  Historical Provider, MD   oxyCODONE-acetaminophen (PERCOCET) 5-325 mg per tablet Take 1 tablet by mouth every 6 (six) hours as needed for moderate pain  2/27/18  Historical Provider, MD   potassium chloride (MICRO-K) 10 MEQ CR capsule Take 10 mEq by mouth 2 (two) times a day  2/27/18  Historical Provider, MD POTASSIUM GLUCONATE PO Take 99 mg by mouth daily  2/27/18  Historical Provider, MD   simvastatin (ZOCOR) 40 mg tablet Take 40 mg by mouth daily at bedtime  2/27/18  Historical Provider, MD   vitamin E, tocopherol, 400 units capsule Take 400 Units by mouth daily  2/27/18  Historical Provider, MD     I have reviewed home medications using allscripts  Allergies: No Known Allergies    Social History:     Marital Status:    Occupation:  Retired  Patient Pre-hospital Living Situation: SNF  Patient Pre-hospital Level of Mobility:  Active  Patient Pre-hospital Diet Restrictions:  None reported  Substance Use History:   History   Alcohol Use No     History   Smoking Status    Never Smoker   Smokeless Tobacco    Never Used     History   Drug Use No       Family History:    non-contributory    Physical Exam:     Vitals:   Blood Pressure: 119/60 (02/27/18 2113)  Pulse: 65 (02/27/18 2113)  Temperature: 97 6 °F (36 4 °C) (02/27/18 2113)  Temp Source: Temporal (02/27/18 2113)  Respirations: 20 (02/27/18 2113)  Height: 5' 5" (165 1 cm) (02/27/18 1256)  Weight - Scale: 80 4 kg (177 lb 4 oz) (02/27/18 2113)  SpO2: 93 % (02/27/18 2113)    Physical Exam   Constitutional: No distress  HENT:   Head: Normocephalic and atraumatic  Eyes: EOM are normal  Pupils are equal, round, and reactive to light  Right eye exhibits no discharge  Left eye exhibits no discharge  Neck: Normal range of motion  Neck supple  No JVD present  Cardiovascular: Normal rate  Exam reveals no friction rub  No murmur heard  Pulmonary/Chest: No stridor  No respiratory distress  He has no wheezes  He has rales  Abdominal: Soft  He exhibits no distension  There is no tenderness  Musculoskeletal: He exhibits edema  Bilateral Lower extremity edema   Neurological: He is alert  Alert to person and place   Skin: Skin is warm and dry  No rash noted  No erythema         Additional Data:     Lab Results: I have personally reviewed pertinent reports  Results from last 7 days  Lab Units 02/27/18  1321   WBC Thousand/uL 4 03*   HEMOGLOBIN g/dL 11 3*   HEMATOCRIT % 37 8   PLATELETS Thousands/uL 139*   NEUTROS PCT % 46   LYMPHS PCT % 13*   MONOS PCT % 40*   EOS PCT % 1       Results from last 7 days  Lab Units 02/27/18  1321   SODIUM mmol/L 142   POTASSIUM mmol/L 4 9   CHLORIDE mmol/L 105   CO2 mmol/L 28   BUN mg/dL 53*   CREATININE mg/dL 1 99*   CALCIUM mg/dL 8 8   TOTAL PROTEIN g/dL 7 4   BILIRUBIN TOTAL mg/dL 0 60   ALK PHOS U/L 93   ALT U/L 20   AST U/L 24   GLUCOSE RANDOM mg/dL 147*       Results from last 7 days  Lab Units 02/27/18  1321   INR  1 25*       Imaging: I have personally reviewed pertinent reports  Ct Abdomen Pelvis Wo Contrast    Result Date: 2/27/2018  Narrative: CT ABDOMEN AND PELVIS WITHOUT IV CONTRAST INDICATION: diarhea abdm distention  History taken directly from the electronic ordering system  COMPARISON: None  TECHNIQUE:  CT examination of the abdomen and pelvis was performed without intravenous contrast   Axial, sagittal, and coronal 2D reformatted images were created from the source data and submitted for interpretation  Radiation dose length product (DLP) for this visit:  721 9 mGy-cm   This examination, like all CT scans performed in the [facility], was performed utilizing techniques to minimize radiation dose exposure, including the use of iterative reconstruction and automated exposure control  Enteric contrast was not administered  FINDINGS: ABDOMEN LOWER CHEST:  Bilateral pleural effusions, right greater than left  Atelectatic versus consolidative changes at the left base  LIVER/BILIARY TREE:  Within limitations of noncontrast, no focal lesion  GALLBLADDER:  No calcified gallstones  No pericholecystic inflammatory change  SPLEEN:  Unremarkable  PANCREAS:  Much of the pancreas is atrophic  ADRENAL GLANDS:  Unremarkable  KIDNEYS/URETERS:  Simple appearing left renal cysts    6 mm catheter in the distal left ureter just proximal to the UVJ  Mild left hydroureter without substantial hydronephrosis  Punctate calculus in the superior pole of the left kidney  STOMACH AND BOWEL:  No evidence for bowel obstruction  Diverticulosis  No clear evidence for diverticulitis or colitis  APPENDIX:  No findings to suggest appendicitis  ABDOMINOPELVIC CAVITY:  Mild abdominal ascites  Scattered lymph nodes in the retroperitoneum which are nonspecific  VESSELS:  Atherosclerotic changes are present  No evidence of aneurysm  PELVIS REPRODUCTIVE ORGANS:  Prostatomegaly URINARY BLADDER:  Bladder is slightly thick-walled  Correlate with UA for cystitis in the possibility of chronic bladder outlet obstruction  ABDOMINAL WALL/INGUINAL REGIONS:  Anasarca  OSSEOUS STRUCTURES:  No acute fracture or destructive osseous lesion  Impression: 6 mm calculus in the distal left ureter just proximal to the UVJ resulting in mild left-sided hydroureter but no significant hydronephrosis  Bilateral pleural effusions, right greater than left with atelectatic versus consolidative change at the left base  Thick-walled bladder  Could reflect cystitis versus sequela chronic outlet obstruction  Mild abdominal ascites  Workstation performed: [id number]     Xr Chest 1 View Portable    Result Date: 2/27/2018  Narrative: CHEST INDICATION:  Shortness of breath  diarrhea COMPARISON:  October 4, 2017  EXAM PERFORMED/VIEWS:  XR CHEST PORTABLE FINDINGS: Heart enlarged  ICD present  Pulmonary vessels prominent and indistinct  Opacification in the lower left hemithorax, probably a combination of pleural effusion and atelectasis or consolidation in the left lower lobe  Right lung clear  No pneumothorax  Osseous structures appear within normal limits for patient age  Impression: 1  Cardiomegaly and pulmonary vascular congestion  2   Probable small left pleural effusion and atelectasis or consolidation in the left lower lobe   Workstation performed: [id number]       EKG, Pathology, and Other Studies Reviewed on Admission:   · EKG:     Allscripts / Epic Records Reviewed: Yes     ** Please Note: This note has been constructed using a voice recognition system   **

## 2018-02-28 NOTE — OCCUPATIONAL THERAPY NOTE
Occupational Therapy Evaluation     Patient Name: Tierney Oglesby  XSQBR'[id number] Date: 2/28/2018  Problem List  Patient Active Problem List   Diagnosis    Kidney stone    Abdominal pain    Stage 3 chronic kidney disease    COPD (chronic obstructive pulmonary disease) (HCC)    Hyperlipidemia    Essential hypertension    Type 2 diabetes mellitus (HCC)    Systolic congestive heart failure (HCC)    Elevated troponin I level    Acute kidney injury superimposed on chronic kidney disease (HCC)    Ureterolithiasis    Thrombocytopenia (HCC)    Leukopenia    Non-ST elevation myocardial infarction (NSTEMI), type 2 ([address] )     Past Medical History  Past Medical History:   Diagnosis Date    AICD (automatic cardioverter/defibrillator) present     Aortic stenosis     Arthritis     [facility]     Constipation     Dependent on walker for ambulation     Diabetes mellitus (HCC)     NIDDM - on no meds presently    Hyperlipidemia     Hypertension     Lumbar back pain     Lumbar spondylosis     Myocardial infarction     MI X2    Stage 3a chronic kidney disease     Wears glasses      Past Surgical History  Past Surgical History:   Procedure Laterality Date    CARDIAC DEFIBRILLATOR PLACEMENT      CARDIAC DEFIBRILLATOR PLACEMENT      CARPAL TUNNEL RELEASE      CATARACT EXTRACTION Bilateral     HERNIA REPAIR      RHIZOTOMY Right 3/16/2017    Procedure: RHIZOTOMY LUMBAR,RADIO FREQUENCY LESIONING L3-4 L4-5 L5-S1;  Surgeon: Mary Sylvester MD;  Location: [facility];  Service:            02/28/18 1301   Note Type   Note type Eval/Treat   Restrictions/Precautions   Weight Bearing Precautions Per Order No   Other Precautions Contact/isolation; Bed Alarm;Multiple lines;Telemetry;O2;Fall Risk   Pain Assessment   Pain Assessment No/denies pain   Home Living   Type of Home SNF   Additional Comments pt resides at [facility]   Prior Function   Level of [place] Needs assistance with ADLs and functional mobility; Needs assistance with IADLs   Lives With Facility staff   Receives Help From Personal care attendant   ADL Assistance Needs assistance   IADLs Needs assistance   Falls in the last 6 months 0   Comments pt utilizes RW at baseline for FM   Psychosocial   Psychosocial (WDL) X   Patient Behaviors/Mood Flat affect   Subjective   Subjective "I want some juice"   Bed Mobility   Supine to Sit 5  Supervision   Additional items Bedrails;Verbal cues   Sit to Supine 4  Minimal assistance   Additional items Assist x 1;LE management;Verbal cues   Transfers   Sit to Stand 5  Supervision   Additional items Verbal cues  (RW)   Stand to Sit 5  Supervision   Additional items Verbal cues  (RW)   Functional Mobility   Functional Mobility 5  Supervision   Additional Comments pt performed FM around bed to the other side with use of RW; pt limited this session due to low blood sugar    Additional items Rolling walker   Balance   Static Sitting Good   Dynamic Sitting Good   Static Standing Fair +   Dynamic Standing Fair +   Ambulatory Fair   Activity Tolerance   Activity Tolerance Patient limited by fatigue   RUE Assessment   RUE Assessment WFL   LUE Assessment   LUE Assessment WFL   Hand Function   Gross Motor Coordination Functional   Fine Motor Coordination Functional   Sensation   Light Touch No apparent deficits   Sharp/Dull No apparent deficits   Cognition   Overall Cognitive Status WFL   Arousal/Participation Alert   Attention Within functional limits   Orientation Level Oriented to person;Oriented to place;Oriented to situation   Memory Decreased long term memory   Following Commands Follows all commands and directions without difficulty   Assessment   Limitation Decreased ADL status; Decreased UE strength;Decreased Safe judgement during ADL;Decreased endurance;Decreased self-care trans;Decreased high-level ADLs   Assessment pt presents at evaluation performing at overall (S) level with use of RW; pt limited this session due to low blood sugar; pt will benefit from continued OT intervention prior to return to SNF for rehab and (A) PRN   Goals   Short Term Goal  pt will perform LB dressing at (I) level    Long Term Goal #1 pt will perform UB/LB bathing and grooming tasks at (S) level    Long Term Goal #2 pt will perform FM c RW at mod (I) level    Long Term Goal pt will perform toilet transfers and tasks at (S) level    Plan   Treatment Interventions ADL retraining;Functional transfer training;UE strengthening/ROM; Endurance training;Patient/family training; Activityengagement   Goal Expiration Date 03/14/18   OT Frequency 3-5x/wk   Recommendation   OT Discharge Recommendation 24 hour supervision/assist   OT - OK to Discharge No   Barthel Index   Feeding 10   Bathing 0   Grooming Score 0   Dressing Score 5   Bladder Score 10   Bowels Score 10   Toilet Use Score 5   Transfers (Bed/Chair) Score 10   Mobility (Level Surface) Score 10   Stairs Score 0   Barthel Index Score 60       Pt will benefit from continued OT services in order to maximize (I) c ADL performance, FM c RW, and improve overall endurance/strength required to complete functional tasks in preparation for d/c  Pt left supine in bed at end of session; all needs within reach; all lines intact; scds connected and turned on

## 2018-02-28 NOTE — PHYSICAL THERAPY NOTE
PT Evaluation     02/28/18 0916   Note Type   Note type Eval/Treat   Pain Assessment   Pain Assessment 0-10   Pain Score No Pain   Home Living   Type of Home SNF   Additional Comments ([facility] [facility])   Prior Function   Level of [place] Needs assistance with ADLs and functional mobility   Lives With Facility staff   Receives Help From (SNF staff)   ADL Assistance Needs assistance   IADLs Needs assistance   Falls in the last 6 months 0   Vocational Retired   Cognition   Overall Cognitive Status WFL   Arousal/Participation Alert   Orientation Level Oriented X4   Memory Within functional limits   Following Commands Follows all commands and directions without difficulty   RUE Assessment   RUE Assessment (see OT eval)   LUE Assessment   LUE Assessment (see OT eval)   RLE Assessment   RLE Assessment WFL  (RLE strength 3-3+/5)   LLE Assessment   LLE Assessment WFL  (LLE strength grossly 3-3+/5)   Light Touch   RLE Light Touch Grossly intact   LLE Light Touch Grossly intact   Bed Mobility   Supine to Sit 5  Supervision   Additional items Assist x 1;Bedrails   Sit to Supine 4  Minimal assistance   Additional items Assist x 1   Transfers   Sit to Stand 5  Supervision   Additional items Assist x 1;Verbal cues   Stand to Sit 5  Supervision   Additional items Assist x 1;Verbal cues   Ambulation/Elevation   Gait pattern Antalgic; Forward Flexion; Wide LA   Gait Assistance 5  Supervision   Additional items Assist x 1   Assistive Device Rolling walker   Distance 10 feet   Stair Management Assistance Not tested   Balance   Static Sitting Good   Dynamic Sitting Fair   Static Standing Good   Dynamic Standing Fair   Ambulatory Fair   Endurance Deficit   Endurance Deficit Yes   Activity Tolerance   Activity Tolerance Patient limited by fatigue   Assessment   Prognosis Fair   Problem List Decreased range of motion;Decreased strength;Decreased endurance; Impaired balance;Decreased mobility; Decreased safety awareness   Assessment Pt presents from [facility] with chief complaint of UTI and kidney stone requiring possible surgery  Pt is currently having issues with low blood glucose causing fatigue  Pt demonstrates decreased BLE strength and ROM, impaired balance, decreased bed mobility and transfers, decreased endurance, decreased ambulation ability, and decreased overall functional mobility  Pt would benefit from continued PT as well as d/c back to [facility] with skilled PT when medically stable  Goals   LTG Expiration Date 03/14/18   Long Term Goal #1 Pt will be mod I with all bed mobility and transfers in 2 wks   Long Term Goal #2 Pt will ambulate 100' with RW and close supervision in 2 wks   Plan   Treatment/Interventions Functional transfer training;LE strengthening/ROM; Therapeutic exercise; Endurance training;Patient/family training;Bed mobility;Gait training   PT Frequency (3-5x/wk)   Recommendation   Recommendation Long-term skilled nursing home placement  (return to [facility])   PT - OK to Discharge Yes  (when medically stable)   Pt did not have SCDs before or after treatment   Pt returned to bed after treatment with call bell in reach

## 2018-02-28 NOTE — CONSULTS
UROLOGY CONSULTATION NOTE     Patient Identifiers: Anna Bennett (MRN [id number])  Service Requesting Consultation: [facility]  Service Providing Consultation:  Urology, Luisito David MD    Date of Service: 2/28/2018    Reason for Consultation: LEFT distal stone      ASSESSMENT:     80 y o  old male with  C diff colitis and an incidentally found distal 6 mm stone  PLAN:     The patient has been seen by Cardiology and has been deemed high risk for any surgical intervention  Nephrology agrees that the patient has hydroureter is mild and his kidney function is improving with fluid hydration  My inclination would be to avoid operative management of the patient's distal ureteral stone given his medical comorbidities  The medical service agrees with this plan  We will maintain the patient with medical expulsive therapy Flomax 0 4 mg daily if this can be tolerated from a cardiac perspective  We have discussed the risk of postural hypotension  I will have the patient return to see me in 4-6 weeks with a repeat CT scan to assess for movement of the stone  No plans for operative intervention during this hospital stay  Should the patient become febrile or hemodynamically compromise, Urology should be called immediately for potential surgical decompression  History of Present Illness:     Anna Bennett is a 80 y o  old with a history of stone disease in the distant past   Patient reports never requiring surgery but having seen urologist many years ago  He currently resides in a nursing facility and has had C diff colitis with nausea vomiting and diarrhea  He presented yesterday and a CT scan demonstrated an incidental left distal 6 mm stone  There was mild hydroureter but no significant hydronephrosis  Patient did have acute kidney injury with a creatinine approaching 2  With fluid hydration, this has improved      Past Medical, Past Surgical History:     Past Medical History:   Diagnosis Date    AICD (automatic cardioverter/defibrillator) present     Aortic stenosis     Arthritis     CHF (congestive heart failure) (HCC)     Constipation     Dependent on walker for ambulation     Diabetes mellitus (HCC)     NIDDM - on no meds presently    Hyperlipidemia     Hypertension     Lumbar back pain     Lumbar spondylosis     Myocardial infarction     MI X2    Stage 3a chronic kidney disease     Wears glasses    :    Past Surgical History:   Procedure Laterality Date    CARDIAC DEFIBRILLATOR PLACEMENT      CARDIAC DEFIBRILLATOR PLACEMENT      CARPAL TUNNEL RELEASE      CATARACT EXTRACTION Bilateral     HERNIA REPAIR      RHIZOTOMY Right 3/16/2017    Procedure: RHIZOTOMY LUMBAR,RADIO FREQUENCY LESIONING L3-4 L4-5 L5-S1;  Surgeon: April Suazo MD;  Location: [facility];  Service:    :    Medications, Allergies:     Current Facility-Administered Medications:     acetaminophen (TYLENOL) tablet 650 mg, 650 mg, Oral, Q6H PRN, Luciana Krishnan, DO    aspirin chewable tablet 81 mg, 81 mg, Oral, Daily, Luciana Krishnan, DO    atorvastatin (LIPITOR) tablet 40 mg, 40 mg, Oral, Daily With Dinner, Luciana Krishnan DO    b complex-vitamin C-folic acid (NEPHROCAPS) capsule 1 capsule, 1 capsule, Oral, Daily With Dinner, Sherman Vogel PA-C    budesonide-formoterol (SYMBICORT) 160-4 5 mcg/act inhaler 2 puff, 2 puff, Inhalation, BID, Sherman Vogel PA-C, 2 puff at 02/28/18 1753    cefTRIAXone (ROCEPHIN) IVPB (premix) 1,000 mg, 1,000 mg, Intravenous, Q24H, Luciana Krishnan DO, Last Rate: 100 mL/hr at 02/28/18 0917, 1,000 mg at 02/28/18 0917    fluticasone (FLONASE) 50 mcg/act nasal spray 1 spray, 1 spray, Nasal, Daily, Luciana Krishnan DO, 1 spray at 02/28/18 9288    heparin (porcine) subcutaneous injection 5,000 Units, 5,000 Units, Subcutaneous, Q8H [facility], 5,000 Units at 02/27/18 2305 **AND** Platelet count, , , Once, Sherman Vogel PA-C    insulin lispro (HumaLOG) 100 units/mL subcutaneous injection 1-6 Units, 1-6 Units, Subcutaneous, TID AC **AND** Fingerstick Glucose (POCT), , , TID AC, Sherman Vogel PA-C    insulin lispro (HumaLOG) 100 units/mL subcutaneous injection 1-6 Units, 1-6 Units, Subcutaneous, HS, Sherman Vogel PA-C    levalbuterol (XOPENEX) inhalation solution 1 25 mg, 1 25 mg, Nebulization, Q6H PRN, Danny Fast, DO    levothyroxine tablet 125 mcg, 125 mcg, Oral, Early Morning, Sherman Vogel PA-C, 125 mcg at 02/28/18 0508    metoprolol tartrate (LOPRESSOR) tablet 25 mg, 25 mg, Oral, BID, Danny Fast, DO, 25 mg at 02/27/18 2305    ondansetron (ZOFRAN) injection 4 mg, 4 mg, Intravenous, Q6H PRN, Sherman Vogel PA-C    sodium chloride 0 9 % inhalation solution 3 mL, 3 mL, Nebulization, Q6H PRN, Danny Fast, DO    tamsulosin (FLOMAX) capsule 0 4 mg, 0 4 mg, Oral, Daily With Dinner, Sherman Vogel PA-C    traZODone (DESYREL) tablet 50 mg, 50 mg, Oral, HS, Sherman Vogel PA-C, 50 mg at 02/27/18 2305    Allergies:  No Known Allergies:    Social and Family History:   Social History:   Social History   Substance Use Topics    Smoking status: Never Smoker    Smokeless tobacco: Never Used    Alcohol use No        History   Smoking Status    Never Smoker   Smokeless Tobacco    Never Used       Family History:  History reviewed  No pertinent family history :     Review of Systems:     General: Fever, chills, or night sweats: negative  Cardiac: Negative for chest pain  Pulmonary: Negative for shortness of breath  Gastrointestinal: Abdominal pain positive  Nausea, vomiting, or diarrhea positive,  Genitourinary: See HPI above  Patient does not have hematuria  All other systems queried were negative  Physical Exam:   General: Patient is pleasant and in NAD   Awake and alert  /58   Pulse 71   Temp (!) 97 1 °F (36 2 °C) (Temporal)   Resp 18   Ht 5' 5" (1 651 m)   Wt 80 2 kg (176 lb 12 9 oz)   SpO2 96%   BMI 29 42 kg/m²   Cardiac: Peripheral edema: negative  Pulmonary: Non-labored breathing  Abdomen: Soft, non-tender, non-distended  No surgical scars  No masses, tenderness, hernias noted  Genitourinary: Negative CVA tenderness, negative suprapubic tenderness  (Male): Penis circumcised, phallus normal, meatus patent  Testicles descended into scrotum bilaterally without masses nor tenderness  No inguinal hernias bilaterally  Labs:     Lab Results   Component Value Date    HGB 9 6 (L) 02/28/2018    HCT 32 9 (L) 02/28/2018    WBC 3 10 (L) 02/28/2018     (L) 02/28/2018   ]    Lab Results   Component Value Date     (H) 02/28/2018    K 4 2 02/28/2018     (H) 02/28/2018    CO2 26 02/28/2018    BUN 47 (H) 02/28/2018    CREATININE 1 78 (H) 02/28/2018    CALCIUM 8 0 (L) 02/28/2018    GLUCOSE 44 (L) 02/28/2018     Urine Microscopic   Order: [id number] - Reflex for Order [id number]   Status:  Final result   Visible to patient:  No (Not Released)   Next appt:  None    Ref Range & Units 2/27/18 1631 Flag   RBC, UA None Seen, 0-5 /hpf 1-2   A    WBC, UA None Seen, 0-5, 5-55, 5-65 /hpf 4-10   A    Epithelial Cells None Seen, Occasional /hpf  Occasional     Bacteria, UA None Seen, Occasional /hpf  Occasional        Specimen Collected: 02/27/18 16:31                  Imaging:   I personally reviewed the images and report of the following studies, and reviewed them with the patient:    CT ABDOMEN AND PELVIS WITHOUT IV CONTRAST     INDICATION: diarhea abdm distention  History taken directly from the electronic ordering system      COMPARISON: None      TECHNIQUE:  CT examination of the abdomen and pelvis was performed without intravenous contrast   Axial, sagittal, and coronal 2D reformatted images were created from the source data and submitted for interpretation       Radiation dose length product (DLP) for this visit:  721 9 mGy-cm     This examination, like all CT scans performed in the [facility], was performed utilizing techniques to minimize radiation dose exposure, including the use of iterative   reconstruction and automated exposure control       Enteric contrast was not administered       FINDINGS:     ABDOMEN     LOWER CHEST:  Bilateral pleural effusions, right greater than left  Atelectatic versus consolidative changes at the left base      LIVER/BILIARY TREE:  Within limitations of noncontrast, no focal lesion      GALLBLADDER:  No calcified gallstones  No pericholecystic inflammatory change      SPLEEN:  Unremarkable      PANCREAS:  Much of the pancreas is atrophic        ADRENAL GLANDS:  Unremarkable      KIDNEYS/URETERS:  Simple appearing left renal cysts  6 mm catheter in the distal left ureter just proximal to the UVJ  Mild left hydroureter without substantial hydronephrosis  Punctate calculus in the superior pole of the left kidney      STOMACH AND BOWEL:  No evidence for bowel obstruction  Diverticulosis  No clear evidence for diverticulitis or colitis      APPENDIX:  No findings to suggest appendicitis      ABDOMINOPELVIC CAVITY:  Mild abdominal ascites  Scattered lymph nodes in the retroperitoneum which are nonspecific      VESSELS:  Atherosclerotic changes are present  No evidence of aneurysm      PELVIS     REPRODUCTIVE ORGANS:  Prostatomegaly     URINARY BLADDER:  Bladder is slightly thick-walled  Correlate with UA for cystitis in the possibility of chronic bladder outlet obstruction      ABDOMINAL WALL/INGUINAL REGIONS:  Anasarca      OSSEOUS STRUCTURES:  No acute fracture or destructive osseous lesion      IMPRESSION:     6 mm calculus in the distal left ureter just proximal to the UVJ resulting in mild left-sided hydroureter but no significant hydronephrosis      Bilateral pleural effusions, right greater than left with atelectatic versus consolidative change at the left base      Thick-walled bladder  Could reflect cystitis versus sequela chronic outlet obstruction      Mild abdominal ascites      Thank you for allowing me to participate in this patients care  Please do not hesitate to call with any additional questions    Whit Quintana MD

## 2018-03-01 ENCOUNTER — APPOINTMENT (INPATIENT)
Dept: RADIOLOGY | Facility: HOSPITAL | Age: 83
DRG: 693 | End: 2018-03-01
Payer: COMMERCIAL

## 2018-03-01 LAB
ANION GAP SERPL CALCULATED.3IONS-SCNC: 9 MMOL/L (ref 4–13)
BASOPHILS # BLD AUTO: 0 THOUSANDS/ΜL (ref 0–0.1)
BASOPHILS NFR BLD AUTO: 0 % (ref 0–1)
BUN SERPL-MCNC: 49 MG/DL (ref 5–25)
CALCIUM SERPL-MCNC: 7.9 MG/DL (ref 8.3–10.1)
CHLORIDE SERPL-SCNC: 103 MMOL/L (ref 100–108)
CO2 SERPL-SCNC: 26 MMOL/L (ref 21–32)
CREAT SERPL-MCNC: 2.13 MG/DL (ref 0.6–1.3)
EOSINOPHIL # BLD AUTO: 0.13 THOUSAND/ΜL (ref 0–0.61)
EOSINOPHIL NFR BLD AUTO: 2 % (ref 0–6)
ERYTHROCYTE [DISTWIDTH] IN BLOOD BY AUTOMATED COUNT: 19.2 % (ref 11.6–15.1)
GFR SERPL CREATININE-BSD FRML MDRD: 27 ML/MIN/1.73SQ M
GLUCOSE SERPL-MCNC: 107 MG/DL (ref 65–140)
GLUCOSE SERPL-MCNC: 110 MG/DL (ref 65–140)
GLUCOSE SERPL-MCNC: 143 MG/DL (ref 65–140)
GLUCOSE SERPL-MCNC: 155 MG/DL (ref 65–140)
GLUCOSE SERPL-MCNC: 170 MG/DL (ref 65–140)
HCT VFR BLD AUTO: 32.3 % (ref 36.5–49.3)
HGB BLD-MCNC: 9.4 G/DL (ref 12–17)
LYMPHOCYTES # BLD AUTO: 0.45 THOUSANDS/ΜL (ref 0.6–4.47)
LYMPHOCYTES NFR BLD AUTO: 6 % (ref 14–44)
MCH RBC QN AUTO: 25 PG (ref 26.8–34.3)
MCHC RBC AUTO-ENTMCNC: 29.1 G/DL (ref 31.4–37.4)
MCV RBC AUTO: 86 FL (ref 82–98)
MONOCYTES # BLD AUTO: 1.91 THOUSAND/ΜL (ref 0.17–1.22)
MONOCYTES NFR BLD AUTO: 26 % (ref 4–12)
MRSA NOSE QL CULT: ABNORMAL
MRSA NOSE QL CULT: ABNORMAL
NEUTROPHILS # BLD AUTO: 4.8 THOUSANDS/ΜL (ref 1.85–7.62)
NEUTS SEG NFR BLD AUTO: 66 % (ref 43–75)
PLATELET # BLD AUTO: 114 THOUSANDS/UL (ref 149–390)
PMV BLD AUTO: 10.7 FL (ref 8.9–12.7)
POTASSIUM SERPL-SCNC: 4.6 MMOL/L (ref 3.5–5.3)
RBC # BLD AUTO: 3.76 MILLION/UL (ref 3.88–5.62)
SODIUM SERPL-SCNC: 138 MMOL/L (ref 136–145)
WBC # BLD AUTO: 7.29 THOUSAND/UL (ref 4.31–10.16)

## 2018-03-01 PROCEDURE — 99232 SBSQ HOSP IP/OBS MODERATE 35: CPT | Performed by: INTERNAL MEDICINE

## 2018-03-01 PROCEDURE — G8998 SWALLOW D/C STATUS: HCPCS | Performed by: SPEECH-LANGUAGE PATHOLOGIST

## 2018-03-01 PROCEDURE — 85025 COMPLETE CBC W/AUTO DIFF WBC: CPT | Performed by: PHYSICIAN ASSISTANT

## 2018-03-01 PROCEDURE — 97116 GAIT TRAINING THERAPY: CPT

## 2018-03-01 PROCEDURE — G8996 SWALLOW CURRENT STATUS: HCPCS | Performed by: SPEECH-LANGUAGE PATHOLOGIST

## 2018-03-01 PROCEDURE — 82948 REAGENT STRIP/BLOOD GLUCOSE: CPT

## 2018-03-01 PROCEDURE — 99232 SBSQ HOSP IP/OBS MODERATE 35: CPT | Performed by: PHYSICIAN ASSISTANT

## 2018-03-01 PROCEDURE — 97110 THERAPEUTIC EXERCISES: CPT

## 2018-03-01 PROCEDURE — 97530 THERAPEUTIC ACTIVITIES: CPT

## 2018-03-01 PROCEDURE — 71046 X-RAY EXAM CHEST 2 VIEWS: CPT

## 2018-03-01 PROCEDURE — G8997 SWALLOW GOAL STATUS: HCPCS | Performed by: SPEECH-LANGUAGE PATHOLOGIST

## 2018-03-01 PROCEDURE — 80048 BASIC METABOLIC PNL TOTAL CA: CPT | Performed by: PHYSICIAN ASSISTANT

## 2018-03-01 PROCEDURE — 94760 N-INVAS EAR/PLS OXIMETRY 1: CPT

## 2018-03-01 PROCEDURE — 92610 EVALUATE SWALLOWING FUNCTION: CPT | Performed by: SPEECH-LANGUAGE PATHOLOGIST

## 2018-03-01 PROCEDURE — 94762 N-INVAS EAR/PLS OXIMTRY CONT: CPT

## 2018-03-01 RX ORDER — SODIUM CHLORIDE 9 MG/ML
75 INJECTION, SOLUTION INTRAVENOUS ONCE
Status: COMPLETED | OUTPATIENT
Start: 2018-03-01 | End: 2018-03-01

## 2018-03-01 RX ADMIN — TAMSULOSIN HYDROCHLORIDE 0.4 MG: 0.4 CAPSULE ORAL at 16:33

## 2018-03-01 RX ADMIN — Medication 1 CAPSULE: at 16:33

## 2018-03-01 RX ADMIN — TRAZODONE HYDROCHLORIDE 50 MG: 50 TABLET ORAL at 21:23

## 2018-03-01 RX ADMIN — HEPARIN SODIUM 5000 UNITS: 5000 INJECTION, SOLUTION INTRAVENOUS; SUBCUTANEOUS at 21:23

## 2018-03-01 RX ADMIN — HEPARIN SODIUM 5000 UNITS: 5000 INJECTION, SOLUTION INTRAVENOUS; SUBCUTANEOUS at 06:12

## 2018-03-01 RX ADMIN — SODIUM CHLORIDE 75 ML/HR: 0.9 INJECTION, SOLUTION INTRAVENOUS at 14:41

## 2018-03-01 RX ADMIN — ASPIRIN 81 MG 81 MG: 81 TABLET ORAL at 08:21

## 2018-03-01 RX ADMIN — BUDESONIDE AND FORMOTEROL FUMARATE DIHYDRATE 2 PUFF: 160; 4.5 AEROSOL RESPIRATORY (INHALATION) at 19:42

## 2018-03-01 RX ADMIN — METOPROLOL TARTRATE 25 MG: 25 TABLET ORAL at 18:19

## 2018-03-01 RX ADMIN — LEVOTHYROXINE SODIUM 125 MCG: 125 TABLET ORAL at 06:12

## 2018-03-01 RX ADMIN — INSULIN LISPRO 1 UNITS: 100 INJECTION, SOLUTION INTRAVENOUS; SUBCUTANEOUS at 11:45

## 2018-03-01 RX ADMIN — INSULIN LISPRO 1 UNITS: 100 INJECTION, SOLUTION INTRAVENOUS; SUBCUTANEOUS at 16:36

## 2018-03-01 RX ADMIN — ACETAMINOPHEN 650 MG: 325 TABLET, FILM COATED ORAL at 11:44

## 2018-03-01 RX ADMIN — ATORVASTATIN CALCIUM 40 MG: 40 TABLET, FILM COATED ORAL at 16:33

## 2018-03-01 RX ADMIN — BUDESONIDE AND FORMOTEROL FUMARATE DIHYDRATE 2 PUFF: 160; 4.5 AEROSOL RESPIRATORY (INHALATION) at 08:23

## 2018-03-01 RX ADMIN — CEFTRIAXONE 1000 MG: 1 INJECTION, SOLUTION INTRAVENOUS at 08:24

## 2018-03-01 RX ADMIN — FLUTICASONE PROPIONATE 1 SPRAY: 50 SPRAY, METERED NASAL at 08:24

## 2018-03-01 RX ADMIN — HEPARIN SODIUM 5000 UNITS: 5000 INJECTION, SOLUTION INTRAVENOUS; SUBCUTANEOUS at 14:36

## 2018-03-01 NOTE — PROGRESS NOTES
Progress Note - Swathi Stark 80 y o  male MRN: [id number]    Unit/Bed#: 411-01 Encounter: [id number]      Assessment:  Patient Active Problem List   Diagnosis    Kidney stone    Abdominal pain    Stage 3 chronic kidney disease    COPD (chronic obstructive pulmonary disease) (HCC)    Hyperlipidemia    Essential hypertension    Type 2 diabetes mellitus (HCC)    Systolic congestive heart failure (HCC)    Elevated troponin I level    Acute kidney injury superimposed on chronic kidney disease (HCC)    Ureterolithiasis    Thrombocytopenia (HCC)    Leukopenia    Non-ST elevation myocardial infarction (NSTEMI), type 2 ([address] )         Plan:  1) Ureterolithiasis L distal ureter - plan for medical management  Patient would be high risk for a procedure given his cardiac history  Strain all urine if possible  Patient seems very comfortable by exam  Await urine culture results  Continue to strain all urine  Consider repeat CT scan or ultrasound if creatinine continues to worsen  2) Chronic systolic CHF - plan to restart diuretics tomorrow due to RADAMES  Echocardiogram showed EF of 30% with severe aortic stenosis  Will need outpatient follow up for possible TAVR  Will continue to monitor weights, intake and output  3) Acute kidney injury on CKD stage 3 / hydronephrosis - creatinine is worsening today  Appreciate nephrology input  Continue to hold diuretics  Plan to give 1 liter normal saline at 75ml/hr  4) COPD - stable, continue respiratory protocol  Symbicort  5) Hypertension - continue metoprolol, mild hypotension present, will monitor  6) Hyperlipidemia - will continue statin therapy  7)  NSTEMI type 2 -  likely in the setting of chronic CHF, RADAMES  No need to further monitor  Subjective:   Patient seen and examined  He denies pain  He is currently on oxygen  Denies SOB, cough, wheezing  No abdominal pain, dysuria, flank pain       Objective:   Vitals: Blood pressure 99/56, pulse 60, temperature 97 7 °F (36 5 °C), temperature source Temporal, resp  rate 18, height 5' 5" (1 651 m), weight 80 2 kg (176 lb 12 9 oz), SpO2 100 %  ,Body mass index is 29 42 kg/m²  Weight (last 2 days)     Date/Time   Weight    02/28/18 0600  80 2 (176 81)    02/27/18 2227  80 4 (177 25)    02/27/18 2113  80 4 (177 25)    02/27/18 1256  82 9 (182 8)              Intake/Output Summary (Last 24 hours) at 03/01/18 1309  Last data filed at 03/01/18 1244   Gross per 24 hour   Intake             1840 ml   Output              450 ml   Net             1390 ml       Physical Exam:   General:  NAD, awake, alert, confused about location, oriented to time  HEENT:  NC/AT, MM dry  Neck:  Supple, No JVD  CV:  + S1, +S2, RRR  Systolic murmur along the LSB  Pulm:  Lung fields are CTA bilaterally, good effort  Abd:  Soft, Non-tender, Non-distended  Ext:  3+ pitting edema of the BLE       Scheduled Meds:  Current Facility-Administered Medications:  acetaminophen 650 mg Oral Q6H PRN Judy Barrientos DO    aspirin 81 mg Oral Daily Judy Barrientos DO    atorvastatin 40 mg Oral Daily With Clarence Verdin DO    b complex-vitamin C-folic acid 1 capsule Oral Daily With Dinner Sherman Vogel PA-C    budesonide-formoterol 2 puff Inhalation BID Sherman Vogel PA-C    cefTRIAXone 1,000 mg Intravenous Q24H Judy Barrientos DO Last Rate: 1,000 mg (03/01/18 0824)   fluticasone 1 spray Nasal Daily Judy Barrientos DO    heparin (porcine) 5,000 Units Subcutaneous Q8H [facility] Sherman Vogel PA-C    insulin lispro 1-6 Units Subcutaneous TID AC Sherman Vogel PA-C    insulin lispro 1-6 Units Subcutaneous HS Sherman Vogel PA-C    levalbuterol 1 25 mg Nebulization Q6H PRN Judy Barrientos DO    levothyroxine 125 mcg Oral Early Morning Sherman Vogel PA-C    metoprolol tartrate 25 mg Oral BID Judy Barrientos DO    ondansetron 4 mg Intravenous Q6H PRN Sherman Vogel PA-C    sodium chloride 3 mL Nebulization Q6H PRN Judy Barrientos DO    tamsulosin 0 4 mg Oral Daily With Dinner Sherman Vogel PA-C    traZODone 50 mg Oral HS Sherman Vogel PA-C      Continuous Infusions:   PRN Meds:   acetaminophen    levalbuterol    ondansetron    sodium chloride      Invasive Devices     Peripheral Intravenous Line            Peripheral IV 02/27/18 Left Forearm 1 day                  Results from last 7 days  Lab Units 03/01/18  0454 02/28/18  0450 02/27/18  2239 02/27/18  1321   WBC Thousand/uL 7 29 3 10*  --  4 03*   HEMOGLOBIN g/dL 9 4* 9 6*  --  11 3*   HEMATOCRIT % 32 3* 32 9*  --  37 8   PLATELETS Thousands/uL 114* 119* 109* 139*   NEUTROS PCT % 66  --   --  46   MONOS PCT % 26*  --   --  40*         Results from last 7 days  Lab Units 03/01/18  0454 02/28/18  0450 02/27/18  1321   SODIUM mmol/L 138 147* 142   POTASSIUM mmol/L 4 6 4 2 4 9   CHLORIDE mmol/L 103 110* 105   CO2 mmol/L 26 26 28   BUN mg/dL 49* 47* 53*   CREATININE mg/dL 2 13* 1 78* 1 99*   CALCIUM mg/dL 7 9* 8 0* 8 8   TOTAL PROTEIN g/dL  --  6 0* 7 4   BILIRUBIN TOTAL mg/dL  --  0 50 0 60   ALK PHOS U/L  --  74 93   ALT U/L  --  18 20   AST U/L  --  22 24   GLUCOSE RANDOM mg/dL 107 44* 147*       Lab Results   Component Value Date    CKTOTAL 73 02/28/2018    TROPONINI 0 07 (HH) 02/28/2018       Lab Results   Component Value Date    INR 1 39 (H) 02/28/2018    INR 1 25 (H) 02/27/2018    PROTIME 17 0 (H) 02/28/2018    PROTIME 15 6 (H) 02/27/2018                 Lab, Imaging and other studies: I have personally reviewed pertinent reports      VTE Pharmacologic Prophylaxis: Heparin  VTE Mechanical Prophylaxis: sequential compression device

## 2018-03-01 NOTE — PROGRESS NOTES
Cardiology Progress Note - Swathi Stark 80 y o  male MRN: [id number]    Unit/Bed#: 411-01 Encounter: [id number]    Assesment/ Plan:  1  Chronic combined systolic and diastolic congestive heart failure  2  Severe aortic stenosis  3  Coronary artery disease stable without angina  4  Ureterolithiasis with possible obstruction  5  Diarrhea  6  Minimal troponin elevation secondary to underlying coronary disease  7  Metabolic encephalopathy  8  Deconditioning  9  Moderate to severe mitral regurgitation  10  Severe pulmonary hypertension     Recommendations: The patient has advanced chronic heart failure secondary to severe valvular heart disease  He does not appear to be acutely decompensated  I suspect the pleural effusions and lower extremity edema are chronic  He has had diarrhea for several days has not been able to keep much food down  I would resume his home diuretic dosing when he is cleared from a urologic standpoint that he does not have obstruction  Outpatient evaluation for transcatheter aortic valve replacement [facility]  I discussed with his daughter yesterday given his overall condition I do not think replacing the aortic valve will have much effect on his quality of life  He can follow up with his primary cardiologist     Call if any questions      Subjective:    No significant events overnight  Patient denies any complaints appears confused    ROS    Objective:   Vitals: Blood pressure 99/56, pulse 60, temperature 97 7 °F (36 5 °C), temperature source Temporal, resp  rate 18, height 5' 5" (1 651 m), weight 80 2 kg (176 lb 12 9 oz), SpO2 100 %  , Body mass index is 29 42 kg/m² , Orthostatic Blood Pressures    Flowsheet Row Most Recent Value   Blood Pressure  99/56 filed at 03/01/2018 0723   Patient Position - Orthostatic VS  Sitting filed at 03/01/2018 4139         Systolic (95NUZ), OJV:624 , Min:86 , NMM:401     Diastolic (62YPG), WCJ:82, Min:47, Max:73      Intake/Output Summary (Last 24 hours) at 03/01/18 0924  Last data filed at 03/01/18 0825   Gross per 24 hour   Intake             1600 ml   Output              350 ml   Net             1250 ml     Weight (last 2 days)     Date/Time   Weight    02/28/18 0600  80 2 (176 81)    02/27/18 2227  80 4 (177 25)    02/27/18 2113  80 4 (177 25)    02/27/18 1256  82 9 (182 8)                Telemetry Review: No significant arrhythmias seen on telemetry review  EKG personally reviewed by Karissa Leary DO  Physical Exam   Constitutional: He is oriented to person, place, and time  He appears well-nourished  No distress  HENT:   Head: Atraumatic  Eyes: Conjunctivae are normal  Pupils are equal, round, and reactive to light  Neck: Neck supple  Cardiovascular: Normal rate and regular rhythm  Exam reveals no friction rub  Murmur heard  Systolic murmur is present with a grade of 2/6   Pulmonary/Chest: Effort normal and breath sounds normal  No respiratory distress  He has no wheezes  He has no rales  Abdominal: Bowel sounds are normal  He exhibits no distension  There is no tenderness  There is no rebound  Musculoskeletal: He exhibits edema  Neurological: He is alert and oriented to person, place, and time  No cranial nerve deficit  Skin: Skin is warm and dry  No erythema  Nursing note and vitals reviewed          Laboratory Results:    Results from last 7 days  Lab Units 02/28/18  0450 02/28/18  0207 02/27/18  2239 02/27/18  1321   CK TOTAL U/L 73  --   --   --    TROPONIN I ng/mL  --  0 07* 0 06* 0 05*       CBC with diff:   Results from last 7 days  Lab Units 03/01/18  0454 02/28/18  0450 02/27/18  2239 02/27/18  1321   WBC Thousand/uL 7 29 3 10*  --  4 03*   HEMOGLOBIN g/dL 9 4* 9 6*  --  11 3*   HEMATOCRIT % 32 3* 32 9*  --  37 8   MCV fL 86 86  --  84   PLATELETS Thousands/uL 114* 119* 109* 139*   MCH pg 25 0* 25 0*  --  25 1*   MCHC g/dL 29 1* 29 2*  --  29 9*   RDW % 19 2* 19 3*  --  19 8*   MPV fL 10 7 10 3 9 5 10 7         CMP:  Results from last 7 days  Lab Units 18  0454 18  0450 18  1321   SODIUM mmol/L 138 147* 142   POTASSIUM mmol/L 4 6 4 2 4 9   CHLORIDE mmol/L 103 110* 105   CO2 mmol/L 26 26 28   ANION GAP mmol/L 9 11 9   BUN mg/dL 49* 47* 53*   CREATININE mg/dL 2 13* 1 78* 1 99*   GLUCOSE RANDOM mg/dL 107 44* 147*   CALCIUM mg/dL 7 9* 8 0* 8 8   AST U/L  --  22 24   ALT U/L  --  18 20   ALK PHOS U/L  --  74 93   TOTAL PROTEIN g/dL  --  6 0* 7 4   BILIRUBIN TOTAL mg/dL  --  0 50 0 60   EGFR ml/min/1 73sq m 27 33 29         BMP:  Results from last 7 days  Lab Units 18  0454 18  0450 18  1321   SODIUM mmol/L 138 147* 142   POTASSIUM mmol/L 4 6 4 2 4 9   CHLORIDE mmol/L 103 110* 105   CO2 mmol/L 26 26 28   BUN mg/dL 49* 47* 53*   CREATININE mg/dL 2 13* 1 78* 1 99*   GLUCOSE RANDOM mg/dL 107 44* 147*   CALCIUM mg/dL 7 9* 8 0* 8 8       BNP: No results for input(s): BNP in the last 72 hours      Magnesium:   Results from last 7 days  Lab Units 18  0450   MAGNESIUM mg/dL 2 0       Coags:   Results from last 7 days  Lab Units 18  0450 18  1321   PTT seconds 40*  --    INR  1 39* 1 25*       TSH:        Hemoglobin A1C   Results from last 7 days  Lab Units 18  0450   HEMOGLOBIN A1C % 5 6       Lipid Profile:       Cardiac testing:   Results for orders placed during the hospital encounter of 18   Echo complete with contrast if indicated    Narrative [address] [place], [place] [address]esther [address] [place] [address] [phone]    Transthoracic Echocardiogram  2D, M-mode, Doppler, and Color Doppler    Study date:  2018    Patient: Felix Olson  MR number: [id number]  Account number: [de-identified]  : 05-Mar-1929  Age: 80 years  Gender: Male  Status: Inpatient  Location: Bedside  Height: 65 in  Weight: 176 lb  BP: 109/ 64 mmHg    Indications: diarrhea, nausea    Diagnoses: 428 9 - HEART FAILURE NOS    Sonographer:  Valente Saenz RDCS, CCT  Primary Physician:  Marcus Denney MD  Referring Physician:  Juan Carlos Scruggs DO  Group:  [facility]  Interpreting Physician:  Hannah Mcnulty DO    SUMMARY    LEFT VENTRICLE:  Systolic function was severely reduced  Ejection fraction was estimated to be 30 %  There was severe diffuse hypokinesis with regional variations  Wall thickness was increased  There was severe concentric hypertrophy  RIGHT VENTRICLE:  The ventricle was mildly dilated  Systolic function was reduced  LEFT ATRIUM:  The atrium was moderately dilated  RIGHT ATRIUM:  The atrium was mildly to moderately dilated  MITRAL VALVE:  There was mild to moderate annular calcification  There was moderate to severe regurgitation  AORTIC VALVE:  The valve was trileaflet  Leaflets exhibited markedly increased thickness and markedly reduced cuspal separation  There was severe stenosis  There was moderate regurgitation  Valve mean gradient was 38 mmHg  The aortic valve obstructive index (by VTI) was 0 22  Estimated aortic valve area (by VTI) was 0 84 cm squared  TRICUSPID VALVE:  There was moderate regurgitation  Estimated peak PA pressure was 72 mmHg  The findings suggest severe pulmonary hypertension  PULMONIC VALVE:  There was mild regurgitation  IVC, HEPATIC VEINS:  The inferior vena cava was dilated, with poor inspiratory collapse, consistent with elevated right atrial pressure  PERICARDIUM:  A small pericardial effusion was identified  There was no evidence of hemodynamic compromise  HISTORY: PRIOR HISTORY: aortic stenosis    PROCEDURE: The procedure was performed at the bedside  This was a routine study  The transthoracic approach was used  The study included complete 2D imaging, M-mode, complete spectral Doppler, and color Doppler  The heart rate was 80 bpm,  at the start of the study  LEFT VENTRICLE: Size was normal  Systolic function was severely reduced   Ejection fraction was estimated to be 30 %  There was severe diffuse hypokinesis with regional variations  Wall thickness was increased  There was severe concentric  hypertrophy  DOPPLER: The study was not technically sufficient to allow evaluation of LV diastolic function  RIGHT VENTRICLE: The ventricle was mildly dilated  Systolic function was reduced  Wall thickness was normal  A pacing wire was present  LEFT ATRIUM: The atrium was moderately dilated  RIGHT ATRIUM: The atrium was mildly to moderately dilated  MITRAL VALVE: There was mild to moderate annular calcification  Valve structure was normal  There was normal leaflet separation  DOPPLER: The transmitral velocity was within the normal range  There was no evidence for stenosis  There was  moderate to severe regurgitation  AORTIC VALVE: The valve was trileaflet  Leaflets exhibited markedly increased thickness and markedly reduced cuspal separation  DOPPLER: Transaortic velocity was within the normal range  There was severe stenosis  There was moderate  regurgitation  TRICUSPID VALVE: The valve structure was normal  There was normal leaflet separation  DOPPLER: The transtricuspid velocity was within the normal range  There was no evidence for stenosis  There was moderate regurgitation  Estimated peak PA  pressure was 72 mmHg  The findings suggest severe pulmonary hypertension  PULMONIC VALVE: Not well visualized  DOPPLER: There was mild regurgitation  PERICARDIUM: A small pericardial effusion was identified  There was no evidence of hemodynamic compromise  AORTA: The root exhibited normal size  SYSTEMIC VEINS: IVC: The inferior vena cava was dilated, with poor inspiratory collapse, consistent with elevated right atrial pressure      MEASUREMENT TABLES    2D MEASUREMENTS  LVOT   (Reference normals)  Diam   22 mm   (--)    DOPPLER MEASUREMENTS  LVOT   (Reference normals)  Peak harley   75 cm/s   (--)  VTI   20 cm   (--)  Stroke vol   76 03 ml (--)  Aortic valve   (Reference normals)  Peak osbaldo   381 cm/s   (--)  VTI   92 cm   (--)  Mean gradient   38 mmHg   (--)  Obstr index, VTI   0 22    (--)  Valve area, VTI   0 84 cm squared   (--)  Obstr index, Vmax   0 2    (--)  Valve area, Vmax   0 76 cm squared   (--)    SYSTEM MEASUREMENT TABLES    2D  %FS: 13 35 %  Ao Diam: 3 28 cm  EDV(Teich): 149 12 ml  EF Biplane: 51 27 %  EF(Teich): 28 27 %  ESV(Teich): 106 97 ml  IVSd: 1 7 cm  LA Area: 31 8 cm2  LA Diam: 5 57 cm  LVEDV MOD A2C: 151 9 ml  LVEDV MOD A4C: 161 44 ml  LVEDV MOD BP: 157 64 ml  LVEF MOD A2C: 57 17 %  LVEF MOD A4C: 43 63 %  LVESV MOD A2C: 65 06 ml  LVESV MOD A4C: 91 ml  LVESV MOD BP: 76 82 ml  LVIDd: 5 53 cm  LVIDs: 4 79 cm  LVLd A2C: 8 43 cm  LVLd A4C: 8 58 cm  LVLs A2C: 6 83 cm  LVLs A4C: 6 8 cm  LVOT Diam: 2 17 cm  LVPWd: 1 35 cm  RA Area: 20 83 cm2  RV DIAM: 4 07 cm  SV MOD A2C: 86 84 ml  SV MOD A4C: 70 44 ml  SV(Teich): 42 15 ml    CW  AR Dec [place]: 2 44 m/s2  AR Dec Time: 1693 05 ms  AR PHT: 490 99 ms  AR Vmax: 4 14 m/s  AR maxP 44 mmHg  AV Env  Ti: 330 45 ms  AV VTI: 94 03 cm  AV Vmax: 3 74 m/s  AV Vmax: 3 77 m/s  AV Vmean: 2 85 m/s  AV maxP 1 mmHg  AV maxP 01 mmHg  AV meanP 52 mmHg  RAP: 0 mmHg  TR Vmax: 3 47 m/s  TR maxP 31 mmHg    MM  TAPSE: 3 27 cm    PW  SHOLA (VTI): 0 71 cm2  SHOLA Vmax: 0 68 cm2  SHOLA Vmax: 0 68 cm2  SHOLA Vmax, Pt: 0 68 cm2  SHOLA Vmax, Pt: 0 69 cm2  E' Sept: 0 03 m/s  E/E' Sept: 31 77  LVOT Env  Ti: 392 73 ms  LVOT VTI: 18 09 cm  LVOT Vmax: 0 69 m/s  LVOT Vmax: 0 69 m/s  LVOT Vmean: 0 46 m/s  LVOT maxP 93 mmHg  LVOT maxP 9 mmHg  LVOT meanP 94 mmHg  LVSV Dopp: 67 09 ml  MV A Osbaldo: 1 25 m/s  MV Dec [place]: 11 63 m/s2  MV DecT: 92 98 ms  MV E Osbaldo: 1 08 m/s  MV E/A Ratio: 0 86  RVSP: 48 31 mmHg    [facility] Accredited Echocardiography Laboratory    Prepared and electronically signed by    Thomas Jeffrey DO  Signed [id number] 16:47:02       No results found for this or any previous visit  No results found for this or any previous visit  No results found for this or any previous visit      Meds/Allergies   all current active meds have been reviewed  Prescriptions Prior to Admission   Medication    acetaminophen (TYLENOL) 325 mg tablet    aspirin 325 mg tablet    B Complex-C-Folic Acid (DAVIDE CAPS) 1 MG CAPS    bisacodyl (BISCOLAX) 10 mg suppository    budesonide-formoterol (SYMBICORT) 160-4 5 mcg/act inhaler    bumetanide (BUMEX) 1 mg tablet    cholecalciferol (VITAMIN D3) 1,000 units tablet    finasteride (PROSCAR) 5 mg tablet    fluticasone (FLONASE) 50 mcg/act nasal spray    glimepiride (AMARYL) 2 mg tablet    hydrOXYzine HCL (ATARAX) 25 mg tablet    insulin aspart (NovoLOG) 100 units/mL injection    levothyroxine 125 mcg tablet    lubiprostone (AMITIZA) 24 mcg capsule    magnesium hydroxide (MILK OF MAGNESIA) 400 mg/5 mL oral suspension    metoprolol tartrate (LOPRESSOR) 25 mg tablet    pantoprazole (PROTONIX) 40 mg    simvastatin (ZOCOR) 20 mg tablet    sodium chloride (OCEAN) 0 65 % nasal spray    Sodium Phosphates (FLEET ENEMA RE)    tamsulosin (FLOMAX) 0 4 mg    traZODone (DESYREL) 50 mg tablet    zolpidem (AMBIEN) 5 mg tablet          Assessment:  Principal Problem:    Ureterolithiasis  Active Problems:    Abdominal pain    Stage 3 chronic kidney disease    COPD (chronic obstructive pulmonary disease) (HCC)    Hyperlipidemia    Essential hypertension    Type 2 diabetes mellitus (HCC)    Systolic congestive heart failure (HCC)    Elevated troponin I level    Acute kidney injury superimposed on chronic kidney disease (HCC)    Thrombocytopenia (HCC)    Leukopenia    Non-ST elevation myocardial infarction (NSTEMI), type 2 ([facility] )

## 2018-03-01 NOTE — PHYSICAL THERAPY NOTE
PT treatment Note      03/01/18 0954   Bed Mobility   Supine to Sit 5  Supervision   Additional items Assist x 1;HOB elevated; Bedrails;Verbal cues   Transfers   Sit to Stand 5  Supervision   Additional items Assist x 1;Verbal cues   Stand to Sit 5  Supervision   Additional items Assist x 1; Armrests; Verbal cues   Stand pivot 5  Supervision   Ambulation/Elevation   Gait Assistance 5  Supervision   Additional items Assist x 1   Assistive Device Rolling walker   Distance 150' slow gait  SpO2 100-98% with 3 L  (retruned to 2 5L at rest)   Balance   Static Sitting Good   Dynamic Sitting Fair +   Static Standing Fair   Dynamic Standing Fair   Ambulatory Fair  (with RW)   Endurance Deficit   Endurance Deficit Yes   Activity Tolerance   Activity Tolerance Patient tolerated treatment well   Exercises   Hip Flexion Sitting;20 reps   Hip Abduction Sitting;20 reps   Hip Adduction Sitting;20 reps   Knee AROM Long Arc Quad Sitting;20 reps   Ankle Pumps Sitting;20 reps   Assessment   Prognosis Good   Problem List Decreased strength;Decreased endurance; Impaired balance;Decreased mobility; Decreased safety awareness   Assessment performing functional mobility at (S) level with verbal cues for safety  Tolerated increased distance well with SpO2 high 90%'s  Pt would benefit from continued PT as well as d/c back to [facility] with skilled PT when medically stable  Plan   Treatment/Interventions Functional transfer training;LE strengthening/ROM; Endurance training;Bed mobility;Gait training   Progress Progressing toward goals   Recommendation   Recommendation Long-term skilled PT   Pt  OOB with call bell within reach, scd's connected and turned on and alarm on at end of PT session

## 2018-03-01 NOTE — PROGRESS NOTES
Progress Note - Nephrology   Bennett Said 80 y o  male MRN: [id number]  Unit/Bed#: 411-01 Encounter: [id number]    A/P:  1  RADAMES on top of CKD               Patient's cause of RADAMES a little difficult to ascertain at this time  He has mild hydronephrosis and we cannot blame the entirety of RADAMES on this finding, there may be a small component of reduced renal function due to this anatomic issue  His history would suggest a volume depleted state with n-v-d, however, on objective physical exam, radiologic and by BNP he appears slightly volume overloaded at this time  I would continue to address possible  infection (cystitis), ceftriaxone a safer and more appropriate medication  Continue with supportive care, I will check urine studies at this time for completeness sake of work up  3/1: Renal function is a little worse this AM, I will give 1L of NS today with gentle infusion rate of 75 ml/hr  Patient's FeNa is <1%, given severe cardiac valvulopathy, he will likely benefit from a little more volume expansion given overall clinical findings and probable positive hemodynamic response  2  CKD III with baseline Cr ~1 4 mg/dL  3  Hypernatremia -resolved  4  HTN + CKD + CHF -patient appears compensated at this time  5  Cystitis               Follow up cultures, continue with ceftriaxone  Culture remains pending  6  Nephrolithiasis with mild right sided hydroureteronephrosis              No intervention from Urology at this time, we will continue to treat medically        Follow up reason for today's visit: RADAMES/CKD    Ureterolithiasis    Patient Active Problem List   Diagnosis    Kidney stone    Abdominal pain    Stage 3 chronic kidney disease    COPD (chronic obstructive pulmonary disease) (HCC)    Hyperlipidemia    Essential hypertension    Type 2 diabetes mellitus (HCC)    Systolic congestive heart failure (HCC)    Elevated troponin I level    [facility] superimposed on [facility] ([facility] )    Ureterolithiasis    Thrombocytopenia (HCC)    Leukopenia    Non-ST elevation myocardial infarction (NSTEMI), type 2 (HCC)         Subjective:     Patient without acute events overnight  Objective:     Vitals: Blood pressure 99/56, pulse 60, temperature 97 7 °F (36 5 °C), temperature source Temporal, resp  rate 18, height 5' 5" (1 651 m), weight 80 2 kg (176 lb 12 9 oz), SpO2 100 %  ,Body mass index is 29 42 kg/m²  Weight (last 2 days)     Date/Time   Weight    02/28/18 0600  80 2 (176 81)    02/27/18 2227  80 4 (177 25)    02/27/18 2113  80 4 (177 25)    02/27/18 1256  82 9 (182 8)                Intake/Output Summary (Last 24 hours) at 03/01/18 0939  Last data filed at 03/01/18 0825   Gross per 24 hour   Intake             1600 ml   Output              350 ml   Net             1250 ml     I/O last 3 completed shifts: In: 2300 [P O :240;  I V :1060; IV Piggyback:1000]  Out: 600 [Urine:600]         Physical Exam: BP 99/56 (BP Location: Right arm)   Pulse 60   Temp 97 7 °F (36 5 °C) (Temporal)   Resp 18   Ht 5' 5" (1 651 m)   Wt 80 2 kg (176 lb 12 9 oz)   SpO2 100%   BMI 29 42 kg/m²     General Appearance:    Alert, cooperative, no distress, appears stated age   Head:    Normocephalic, without obvious abnormality, atraumatic   Eyes:    Conjunctiva/corneas clear   Ears:    Normal external ears   Nose:   Nares normal, septum midline, mucosa normal, no drainage    or sinus tenderness   Throat:   Lips, mucosa, and tongue normal; teeth and gums normal   Neck:   Supple   Back:     Symmetric, no curvature, ROM normal, no CVA tenderness   Lungs:     Clear to auscultation bilaterally, respirations unlabored   Chest wall:    No tenderness or deformity   Heart:    Regular rate and rhythm, S1 and S2 normal, no murmur, rub   or gallop   Abdomen:     Soft, non-tender, bowel sounds active   Extremities:   Extremities normal, atraumatic, no cyanosis, +2 edema bilaterally   Skin:   Skin color, texture, turgor normal, no rashes or lesions   Lymph nodes:   Cervical normal   Neurologic:   CNII-XII intact            Lab, Imaging and other studies: I have personally reviewed pertinent labs  CBC: Lab Results   Component Value Date    WBC 7 29 03/01/2018    HGB 9 4 (L) 03/01/2018    HCT 32 3 (L) 03/01/2018    MCV 86 03/01/2018     (L) 03/01/2018    MCH 25 0 (L) 03/01/2018    MCHC 29 1 (L) 03/01/2018    RDW 19 2 (H) 03/01/2018    MPV 10 7 03/01/2018     CMP: Lab Results   Component Value Date     03/01/2018    K 4 6 03/01/2018     03/01/2018    CO2 26 03/01/2018    ANIONGAP 9 03/01/2018    BUN 49 (H) 03/01/2018    CREATININE 2 13 (H) 03/01/2018    GLUCOSE 107 03/01/2018    CALCIUM 7 9 (L) 03/01/2018    EGFR 27 03/01/2018         Results from last 7 days  Lab Units 03/01/18  0454 02/28/18  0450 02/27/18  1321   SODIUM mmol/L 138 147* 142   POTASSIUM mmol/L 4 6 4 2 4 9   CHLORIDE mmol/L 103 110* 105   CO2 mmol/L 26 26 28   BUN mg/dL 49* 47* 53*   CREATININE mg/dL 2 13* 1 78* 1 99*   CALCIUM mg/dL 7 9* 8 0* 8 8   TOTAL PROTEIN g/dL  --  6 0* 7 4   BILIRUBIN TOTAL mg/dL  --  0 50 0 60   ALK PHOS U/L  --  74 93   ALT U/L  --  18 20   AST U/L  --  22 24   GLUCOSE RANDOM mg/dL 107 44* 147*         Phosphorus: No results found for: PHOS  Magnesium: No results found for: MG  Urinalysis: No results found for: COLORU, CLARITYU, SPECGRAV, PHUR, LEUKOCYTESUR, NITRITE, PROTEINUA, GLUCOSEU, KETONESU, BILIRUBINUR, BLOODU  Ionized Calcium: No results found for: CAION  Coagulation: No results found for: PT, INR, APTT  Troponin: No results found for: TROPONINI  ABG: No results found for: PHART, YVV0YOD, PO2ART, FGV7HJD, Z1GEVEED, BEART, SOURCE  Radiology review:     IMAGING  Procedure: Ct Abdomen Pelvis Wo Contrast    Result Date: 2/27/2018  Narrative: CT ABDOMEN AND PELVIS WITHOUT IV CONTRAST INDICATION: diarhea abdm distention  History taken directly from the electronic ordering system  COMPARISON: None   TECHNIQUE: CT examination of the abdomen and pelvis was performed without intravenous contrast   Axial, sagittal, and coronal 2D reformatted images were created from the source data and submitted for interpretation  Radiation dose length product (DLP) for this visit:  721 9 mGy-cm   This examination, like all CT scans performed in the [facility], was performed utilizing techniques to minimize radiation dose exposure, including the use of iterative reconstruction and automated exposure control  Enteric contrast was not administered  FINDINGS: ABDOMEN LOWER CHEST:  Bilateral pleural effusions, right greater than left  Atelectatic versus consolidative changes at the left base  LIVER/BILIARY TREE:  Within limitations of noncontrast, no focal lesion  GALLBLADDER:  No calcified gallstones  No pericholecystic inflammatory change  SPLEEN:  Unremarkable  PANCREAS:  Much of the pancreas is atrophic  ADRENAL GLANDS:  Unremarkable  KIDNEYS/URETERS:  Simple appearing left renal cysts  6 mm catheter in the distal left ureter just proximal to the UVJ  Mild left hydroureter without substantial hydronephrosis  Punctate calculus in the superior pole of the left kidney  STOMACH AND BOWEL:  No evidence for bowel obstruction  Diverticulosis  No clear evidence for diverticulitis or colitis  APPENDIX:  No findings to suggest appendicitis  ABDOMINOPELVIC CAVITY:  Mild abdominal ascites  Scattered lymph nodes in the retroperitoneum which are nonspecific  VESSELS:  Atherosclerotic changes are present  No evidence of aneurysm  PELVIS REPRODUCTIVE ORGANS:  Prostatomegaly URINARY BLADDER:  Bladder is slightly thick-walled  Correlate with UA for cystitis in the possibility of chronic bladder outlet obstruction  ABDOMINAL WALL/INGUINAL REGIONS:  Anasarca  OSSEOUS STRUCTURES:  No acute fracture or destructive osseous lesion       Impression: 6 mm calculus in the distal left ureter just proximal to the UVJ resulting in mild left-sided hydroureter but no significant hydronephrosis  Bilateral pleural effusions, right greater than left with atelectatic versus consolidative change at the left base  Thick-walled bladder  Could reflect cystitis versus sequela chronic outlet obstruction  Mild abdominal ascites  Workstation performed: [id number]     Procedure: Xr Chest 1 View Portable    Result Date: 2/27/2018  Narrative: CHEST INDICATION:  Shortness of breath  diarrhea COMPARISON:  October 4, 2017  EXAM PERFORMED/VIEWS:  XR CHEST PORTABLE FINDINGS: Heart enlarged  ICD present  Pulmonary vessels prominent and indistinct  Opacification in the lower left hemithorax, probably a combination of pleural effusion and atelectasis or consolidation in the left lower lobe  Right lung clear  No pneumothorax  Osseous structures appear within normal limits for patient age  Impression: 1  Cardiomegaly and pulmonary vascular congestion  2   Probable small left pleural effusion and atelectasis or consolidation in the left lower lobe  Workstation performed: [id number]     Procedure: Us Kidney And Bladder    Result Date: 2/28/2018  Narrative: RENAL ULTRASOUND INDICATION: RENAL FAILURE, ACUTE RENAL FAILURE, CHRONIC arf, crf COMPARISON: CT 2/27/2018 TECHNIQUE:   Ultrasound of the retroperitoneum was performed with a curvilinear transducer utilizing volumetric sweeps and still imaging techniques  FINDINGS: KIDNEYS: Symmetric and normal size  Right kidney:  9 6 cm  Left kidney:  9 8 cm  Right kidney The renal parenchyma is diffusely echogenic consistent with medical renal disease  No suspicious masses detected  No hydronephrosis  No shadowing calculi  No perinephric fluid collections  Left kidney The renal parenchyma is diffusely echogenic consistent with medical renal disease  No suspicious masses detected  Left renal simple cysts  No hydronephrosis  No shadowing calculi  No perinephric fluid collections  URETERS: Nonvisualized   BLADDER: Diffuse bladder wall thickening could relate to cystitis or outlet obstruction changes, correlate clinically  4 7 x 2 9 x 3 6 cm prostate protrudes into the base of the bladder  Small amount of ascites noted in the left upper abdominal quadrant as on CT  Impression: 1  Bilateral medical renal disease  2   No hydronephrosis  3   Left renal cysts  4   Diffuse bladder wall thickening could relate to cystitis or outlet obstruction changes, correlate clinically  4 7 x 2 9 x 3 6 cm prostate protrudes into the base of the bladder   Workstation performed: [id number]       Current Facility-Administered Medications   Medication Dose Route Frequency    acetaminophen (TYLENOL) tablet 650 mg  650 mg Oral Q6H PRN    aspirin chewable tablet 81 mg  81 mg Oral Daily    atorvastatin (LIPITOR) tablet 40 mg  40 mg Oral Daily With Dinner    b complex-vitamin C-folic acid (NEPHROCAPS) capsule 1 capsule  1 capsule Oral Daily With Dinner    budesonide-formoterol (SYMBICORT) 160-4 5 mcg/act inhaler 2 puff  2 puff Inhalation BID    cefTRIAXone (ROCEPHIN) IVPB (premix) 1,000 mg  1,000 mg Intravenous Q24H    fluticasone (FLONASE) 50 mcg/act nasal spray 1 spray  1 spray Nasal Daily    heparin (porcine) subcutaneous injection 5,000 Units  5,000 Units Subcutaneous Q8H [facility]    insulin lispro (HumaLOG) 100 units/mL subcutaneous injection 1-6 Units  1-6 Units Subcutaneous TID AC    insulin lispro (HumaLOG) 100 units/mL subcutaneous injection 1-6 Units  1-6 Units Subcutaneous HS    levalbuterol (XOPENEX) inhalation solution 1 25 mg  1 25 mg Nebulization Q6H PRN    levothyroxine tablet 125 mcg  125 mcg Oral Early Morning    metoprolol tartrate (LOPRESSOR) tablet 25 mg  25 mg Oral BID    ondansetron (ZOFRAN) injection 4 mg  4 mg Intravenous Q6H PRN    sodium chloride 0 9 % inhalation solution 3 mL  3 mL Nebulization Q6H PRN    tamsulosin (FLOMAX) capsule 0 4 mg  0 4 mg Oral Daily With Dinner    traZODone (DESYREL) tablet 50 mg  50 mg Oral HS Medications Discontinued During This Encounter   Medication Reason    aspirin 81 MG tablet     metoprolol tartrate (LOPRESSOR) 50 mg tablet Dose adjustment    simvastatin (ZOCOR) 40 mg tablet Dose adjustment    albuterol (PROVENTIL HFA,VENTOLIN HFA) 90 mcg/act inhaler     allopurinol (ZYLOPRIM) 100 mg tablet     Calcium Carbonate (CALCIUM 600 HIGH POTENCY PO)     DULoxetine (CYMBALTA) 30 mg delayed release capsule     fluticasone-salmeterol (ADVAIR) 250-50 mcg/dose inhaler     furosemide (LASIX) 40 mg tablet     glipiZIDE (GLUCOTROL) 5 mg tablet     indomethacin (INDOCIN) 50 mg capsule     IRON PO     Linagliptin (TRADJENTA) 5 MG TABS     losartan (COZAAR) 50 mg tablet     metolazone (ZAROXOLYN) 5 mg tablet     Misc Natural Products (TART CHERRY ADVANCED PO)     Nutritional Supplements (VITAMIN D BOOSTER PO)     oxyCODONE-acetaminophen (PERCOCET) 5-325 mg per tablet     potassium chloride (MICRO-K) 10 MEQ CR capsule     POTASSIUM GLUCONATE PO     vitamin E, tocopherol, 400 units capsule     sodium chloride 0 9 % infusion     bumetanide (BUMEX) tablet 1 mg     finasteride (PROSCAR) tablet 5 mg     hydrOXYzine HCL (ATARAX) tablet 25 mg     zolpidem (AMBIEN) tablet 5 mg     fluticasone (FLONASE) 50 mcg/act nasal spray 2 spray     acetaminophen (TYLENOL) tablet 650 mg     aspirin tablet 325 mg     pantoprazole (PROTONIX) EC tablet 20 mg     pravastatin (PRAVACHOL) tablet 40 mg        Luis F Ndiaye DO

## 2018-03-01 NOTE — SPEECH THERAPY NOTE
Speech Language/Pathology     03/01/18 1100   Swallow Information   Current Risks for Dysphagia & Aspiration Respiratory compromise;Mental status change   Current Diet Regular; Thin liquid   Baseline Diet Regular; Thin liquids   Baseline Assessment   Behavior/Cognition Alert; Cooperative; Interactive   Speech/Language Status within normal limits   Patient Positioning Upright in chair   Swallow Mechanism Exam   Labial Symmetry WFL   Labial Strength WFL   Labial ROM WFL   Labial Sensation WFL   Facial Symmetry WFL   Facial Strength WFL   Facial ROM WFL   Facial Sensation WFL   Lingual Symmetry WFL   Lingual Strength WFL   Lingual ROM WFL   Lingual Sensation WFL   Velum WFL   Gag WFL   Mandible WFL   Dentition Adequate   Volitional Cough Strong   Consistencies Assessed and Performance   Materials Admnistered Puree/Level 1;Regular/Solid; Thin liquid   Oral Stage WFL   Phargngeal Stage WFL   Swallow Mechanics WFL;Swallow initation; Appears prompt;Good Larygneal rise   Esophageal Concerns No s/s reported   Summary   Swallow Summary Patient presents sitting in chair at bedside  He is able to feed himself  Nursing reports he has completed meals and no s/s with thin liquids or meals or with pills  Patient is cooperative for oral mech exam, which is WNL  He is feeding himself with trials of puree and regular solids  He is using a straw with thin liquids  No oropharyngeal dysphagia at bedside  He is [facility] on current diet level  Recommendations   Risk for Aspiration None   Recommendations Consider oral diet   Diet Solid Recommendation Regular consistency   Diet Liquid Recommendation Thin liquid   Recommended Form of Meds As tolerated; As desired   General Precautions Feed only when alert;Upright as possible for all oral intake   Results Reviewed with RN;PT/Family/Caregiver     Speech/Language Pathology  Assessment    Patient Name: Kirby Cazares  RNPSY'N Date: 3/1/2018     Problem List  Patient Active Problem List   Diagnosis    Kidney stone    Abdominal pain    Stage 3 chronic kidney disease    COPD (chronic obstructive pulmonary disease) (HCC)    Hyperlipidemia    Essential hypertension    Type 2 diabetes mellitus (HCC)    Systolic congestive heart failure (HCC)    Elevated troponin I level    Acute kidney injury superimposed on chronic kidney disease (HCC)    Ureterolithiasis    Thrombocytopenia (HCC)    Leukopenia    Non-ST elevation myocardial infarction (NSTEMI), type 2 (HCC)     Past Medical History  Past Medical History:   Diagnosis Date    AICD (automatic cardioverter/defibrillator) present     Aortic stenosis     Arthritis     [facility]est[facility]     Constipation     Dependent on walker for ambulation     Diabetes mellitus (HCC)     NIDDM - on no meds presently    Hyperlipidemia     Hypertension     Lumbar back pain     Lumbar spondylosis     Myocardial infarction     MI X2    Stage 3a chronic kidney disease     Wears glasses      Past Surgical History  Past Surgical History:   Procedure Laterality Date    CARDIAC DEFIBRILLATOR PLACEMENT      CARDIAC DEFIBRILLATOR PLACEMENT      CARPAL TUNNEL RELEASE      CATARACT EXTRACTION Bilateral     HERNIA REPAIR      RHIZOTOMY Right 3/16/2017    Procedure: RHIZOTOMY LUMBAR,RADIO FREQUENCY LESIONING L3-4 L4-5 L5-S1;  Surgeon: Nancy Link MD;  Location: [facility];  Service:

## 2018-03-02 PROBLEM — R10.9 ABDOMINAL PAIN: Status: RESOLVED | Noted: 2018-02-27 | Resolved: 2018-03-02

## 2018-03-02 PROBLEM — I35.0 SEVERE AORTIC STENOSIS: Status: ACTIVE | Noted: 2018-03-02

## 2018-03-02 LAB
ANION GAP SERPL CALCULATED.3IONS-SCNC: 8 MMOL/L (ref 4–13)
ANISOCYTOSIS BLD QL SMEAR: PRESENT
ATRIAL RATE: 69 BPM
BACTERIA UR CULT: ABNORMAL
BACTERIA UR CULT: ABNORMAL
BASOPHILS # BLD MANUAL: 0 THOUSAND/UL (ref 0–0.1)
BASOPHILS NFR MAR MANUAL: 0 % (ref 0–1)
BUN SERPL-MCNC: 51 MG/DL (ref 5–25)
CALCIUM SERPL-MCNC: 7.7 MG/DL (ref 8.3–10.1)
CHLORIDE SERPL-SCNC: 104 MMOL/L (ref 100–108)
CO2 SERPL-SCNC: 25 MMOL/L (ref 21–32)
CREAT SERPL-MCNC: 2.03 MG/DL (ref 0.6–1.3)
EOSINOPHIL # BLD MANUAL: 0.1 THOUSAND/UL (ref 0–0.4)
EOSINOPHIL NFR BLD MANUAL: 2 % (ref 0–6)
ERYTHROCYTE [DISTWIDTH] IN BLOOD BY AUTOMATED COUNT: 18.8 % (ref 11.6–15.1)
GFR SERPL CREATININE-BSD FRML MDRD: 28 ML/MIN/1.73SQ M
GLUCOSE SERPL-MCNC: 113 MG/DL (ref 65–140)
GLUCOSE SERPL-MCNC: 120 MG/DL (ref 65–140)
GLUCOSE SERPL-MCNC: 132 MG/DL (ref 65–140)
GLUCOSE SERPL-MCNC: 154 MG/DL (ref 65–140)
GLUCOSE SERPL-MCNC: 164 MG/DL (ref 65–140)
HCT VFR BLD AUTO: 30.9 % (ref 36.5–49.3)
HGB BLD-MCNC: 9.3 G/DL (ref 12–17)
HYPERCHROMIA BLD QL SMEAR: PRESENT
LYMPHOCYTES # BLD AUTO: 0.82 THOUSAND/UL (ref 0.6–4.47)
LYMPHOCYTES # BLD AUTO: 16 % (ref 14–44)
MCH RBC QN AUTO: 25.7 PG (ref 26.8–34.3)
MCHC RBC AUTO-ENTMCNC: 30.1 G/DL (ref 31.4–37.4)
MCV RBC AUTO: 85 FL (ref 82–98)
MONOCYTES # BLD AUTO: 0.56 THOUSAND/UL (ref 0–1.22)
MONOCYTES NFR BLD: 11 % (ref 4–12)
NEUTROPHILS # BLD MANUAL: 3.39 THOUSAND/UL (ref 1.85–7.62)
NEUTS SEG NFR BLD AUTO: 66 % (ref 43–75)
P AXIS: -24 DEGREES
PLATELET # BLD AUTO: 105 THOUSANDS/UL (ref 149–390)
PLATELET BLD QL SMEAR: ABNORMAL
PMV BLD AUTO: 10.4 FL (ref 8.9–12.7)
POLYCHROMASIA BLD QL SMEAR: PRESENT
POTASSIUM SERPL-SCNC: 5 MMOL/L (ref 3.5–5.3)
PR INTERVAL: 80 MS
QRS AXIS: -43 DEGREES
QRSD INTERVAL: 138 MS
QT INTERVAL: 488 MS
QTC INTERVAL: 522 MS
RBC # BLD AUTO: 3.62 MILLION/UL (ref 3.88–5.62)
SODIUM SERPL-SCNC: 137 MMOL/L (ref 136–145)
T WAVE AXIS: 155 DEGREES
TOTAL CELLS COUNTED SPEC: 100
VARIANT LYMPHS # BLD AUTO: 5 %
VENTRICULAR RATE: 69 BPM
WBC # BLD AUTO: 5.13 THOUSAND/UL (ref 4.31–10.16)

## 2018-03-02 PROCEDURE — 80048 BASIC METABOLIC PNL TOTAL CA: CPT | Performed by: PHYSICIAN ASSISTANT

## 2018-03-02 PROCEDURE — 93970 EXTREMITY STUDY: CPT | Performed by: SURGERY

## 2018-03-02 PROCEDURE — 97530 THERAPEUTIC ACTIVITIES: CPT

## 2018-03-02 PROCEDURE — 85027 COMPLETE CBC AUTOMATED: CPT | Performed by: PHYSICIAN ASSISTANT

## 2018-03-02 PROCEDURE — 93010 ELECTROCARDIOGRAM REPORT: CPT | Performed by: INTERNAL MEDICINE

## 2018-03-02 PROCEDURE — 97116 GAIT TRAINING THERAPY: CPT

## 2018-03-02 PROCEDURE — 82948 REAGENT STRIP/BLOOD GLUCOSE: CPT

## 2018-03-02 PROCEDURE — 85007 BL SMEAR W/DIFF WBC COUNT: CPT | Performed by: PHYSICIAN ASSISTANT

## 2018-03-02 PROCEDURE — 94760 N-INVAS EAR/PLS OXIMETRY 1: CPT

## 2018-03-02 PROCEDURE — 99233 SBSQ HOSP IP/OBS HIGH 50: CPT | Performed by: INTERNAL MEDICINE

## 2018-03-02 PROCEDURE — 94762 N-INVAS EAR/PLS OXIMTRY CONT: CPT

## 2018-03-02 RX ORDER — SODIUM CHLORIDE 9 MG/ML
75 INJECTION, SOLUTION INTRAVENOUS ONCE
Status: COMPLETED | OUTPATIENT
Start: 2018-03-02 | End: 2018-03-03

## 2018-03-02 RX ADMIN — METOPROLOL TARTRATE 25 MG: 25 TABLET ORAL at 17:06

## 2018-03-02 RX ADMIN — ATORVASTATIN CALCIUM 40 MG: 40 TABLET, FILM COATED ORAL at 17:06

## 2018-03-02 RX ADMIN — BUDESONIDE AND FORMOTEROL FUMARATE DIHYDRATE 2 PUFF: 160; 4.5 AEROSOL RESPIRATORY (INHALATION) at 08:38

## 2018-03-02 RX ADMIN — INSULIN LISPRO 1 UNITS: 100 INJECTION, SOLUTION INTRAVENOUS; SUBCUTANEOUS at 15:27

## 2018-03-02 RX ADMIN — Medication 1 CAPSULE: at 17:06

## 2018-03-02 RX ADMIN — ACETAMINOPHEN 650 MG: 325 TABLET, FILM COATED ORAL at 21:24

## 2018-03-02 RX ADMIN — ASPIRIN 81 MG 81 MG: 81 TABLET ORAL at 08:42

## 2018-03-02 RX ADMIN — FLUTICASONE PROPIONATE 1 SPRAY: 50 SPRAY, METERED NASAL at 08:38

## 2018-03-02 RX ADMIN — TAMSULOSIN HYDROCHLORIDE 0.4 MG: 0.4 CAPSULE ORAL at 17:07

## 2018-03-02 RX ADMIN — HEPARIN SODIUM 5000 UNITS: 5000 INJECTION, SOLUTION INTRAVENOUS; SUBCUTANEOUS at 06:26

## 2018-03-02 RX ADMIN — ACETAMINOPHEN 650 MG: 325 TABLET, FILM COATED ORAL at 13:19

## 2018-03-02 RX ADMIN — INSULIN LISPRO 1 UNITS: 100 INJECTION, SOLUTION INTRAVENOUS; SUBCUTANEOUS at 12:22

## 2018-03-02 RX ADMIN — BUDESONIDE AND FORMOTEROL FUMARATE DIHYDRATE 2 PUFF: 160; 4.5 AEROSOL RESPIRATORY (INHALATION) at 21:23

## 2018-03-02 RX ADMIN — HEPARIN SODIUM 5000 UNITS: 5000 INJECTION, SOLUTION INTRAVENOUS; SUBCUTANEOUS at 14:21

## 2018-03-02 RX ADMIN — SODIUM CHLORIDE 75 ML/HR: 0.9 INJECTION, SOLUTION INTRAVENOUS at 12:12

## 2018-03-02 RX ADMIN — ACETAMINOPHEN 650 MG: 325 TABLET, FILM COATED ORAL at 03:46

## 2018-03-02 RX ADMIN — LEVOTHYROXINE SODIUM 125 MCG: 125 TABLET ORAL at 06:26

## 2018-03-02 RX ADMIN — TRAZODONE HYDROCHLORIDE 50 MG: 50 TABLET ORAL at 21:22

## 2018-03-02 RX ADMIN — METOPROLOL TARTRATE 25 MG: 25 TABLET ORAL at 08:40

## 2018-03-02 RX ADMIN — CEFTRIAXONE 1000 MG: 1 INJECTION, SOLUTION INTRAVENOUS at 08:44

## 2018-03-02 RX ADMIN — HEPARIN SODIUM 5000 UNITS: 5000 INJECTION, SOLUTION INTRAVENOUS; SUBCUTANEOUS at 21:22

## 2018-03-02 NOTE — OCCUPATIONAL THERAPY NOTE
OT Note     03/02/18 0952   General   Missed Treatment Reason Other (Comment)   Assessment   Assessment OT attempted see pt  this a m  Presents in sidelying, appears to be asleep  Does not respond despite calling name multiple times  Recommendation   Recommendation (Continue as able  )

## 2018-03-02 NOTE — SOCIAL WORK
Spoke with pt's daughter:Alissa Carter who states they moved up here from [place] about 3-4 years ago to take care of her dad  So daughter lives with pt  (but pt was at [facility] for STR)  Only DME pt has is [facility], wc and a walker at home  Plans are home likely on Monday with arrangements made for home hospice([facility])  [facility] will be reaching out to pt's daughter to discuss what pt will need on dc  Daughter states they will take pt home by car

## 2018-03-02 NOTE — SOCIAL WORK
[facility] ([facility]) is denying SNF rehab for pt even after Dr Lance Encinas spoke with them  Dr Lance Encinas spoke with pt's daughter and plans will be home (likely on Monday) with arrangements made for home hospice (dx of severe aortic stenosis)  Referral to [facility] homecare (family given choice)

## 2018-03-02 NOTE — PROGRESS NOTES
Progress Note - Nephrology   Anna Bennett 80 y o  male MRN: [id number]  Unit/Bed#: 411-01 Encounter: [id number]    A/P:  1  RADAMES on top of CKD               Patient's cause of RADAMES a little difficult to ascertain at this time  He has mild hydronephrosis and we cannot blame the entirety of RADAMES on this finding, there may be a small component of reduced renal function due to this anatomic issue  His history would suggest a volume depleted state with n-v-d, however, on objective physical exam, radiologic and by BNP he appears slightly volume overloaded at this time  I would continue to address possible  infection (cystitis), ceftriaxone a safer and more appropriate medication  Continue with supportive care, I will check urine studies at this time for completeness sake of work up  3/1: Renal function is a little worse this AM, I will give 1L of NS today with gentle infusion rate of 75 ml/hr  Patient's FeNa is <1%, given severe cardiac valvulopathy, he will likely benefit from a little more volume expansion given overall clinical findings and probable positive hemodynamic response  3/2: Patient with minimal response to IVF volume expansion  He could tolerate more volume expansion and I will offer another 1L of NS today  Continue to closely monitor the patient's volume status as he appears to have a small ability to tolerate too much volume expansion as it will lead into CHF  2  CKD III with baseline Cr ~1 4 mg/dL  3  Hypernatremia -resolved  4  HTN + CKD + CHF -patient appears compensated at this time  5  Cystitis               Follow up cultures, continue with ceftriaxone  Culture remains pending  6  Nephrolithiasis with mild right sided hydroureteronephrosis              No intervention from Urology at this time, we will continue to treat medically  7  Severe pulmonary HTN  8   Mod to severe mitral regurgitation      Follow up reason for today's visit: RADAMES/CKD    Ureterolithiasis    Patient Active Problem List   Diagnosis    Kidney stone    Abdominal pain    Stage 3 chronic kidney disease    COPD (chronic obstructive pulmonary disease) (HCC)    Hyperlipidemia    Essential hypertension    Type 2 diabetes mellitus (HCC)    Systolic congestive heart failure (HCC)    Elevated troponin I level    Acute kidney injury superimposed on chronic kidney disease (HCC)    Ureterolithiasis    Thrombocytopenia (HCC)    Leukopenia    Non-ST elevation myocardial infarction (NSTEMI), type 2 (HCC)         Subjective:     Patient without acute events overnight  Objective:     Vitals: Blood pressure 115/63, pulse 64, temperature 97 5 °F (36 4 °C), temperature source Temporal, resp  rate 18, height 5' 5" (1 651 m), weight 80 2 kg (176 lb 12 9 oz), SpO2 98 %  ,Body mass index is 29 42 kg/m²  Weight (last 2 days)     Date/Time   Weight    03/02/18 0552  80 2 (176 81)    02/28/18 0600  80 2 (176 81)                Intake/Output Summary (Last 24 hours) at 03/02/18 0906  Last data filed at 03/02/18 0904   Gross per 24 hour   Intake             1710 ml   Output              200 ml   Net             1510 ml     I/O last 3 completed shifts: In: 2367 [P O :840;  I V :990]  Out: 300 [Urine:300]         Physical Exam: /63 (BP Location: Left arm)   Pulse 64   Temp 97 5 °F (36 4 °C) (Temporal)   Resp 18   Ht 5' 5" (1 651 m)   Wt 80 2 kg (176 lb 12 9 oz)   SpO2 98%   BMI 29 42 kg/m²     General Appearance:    Alert, cooperative, no distress, appears stated age   Head:    Normocephalic, without obvious abnormality, atraumatic   Eyes:    Conjunctiva/corneas clear   Ears:    Normal external ears   Nose:   Nares normal, septum midline, mucosa normal, no drainage    or sinus tenderness   Throat:   Lips, mucosa, and tongue normal; teeth and gums normal   Neck:   Supple   Back:     Symmetric, no curvature, ROM normal, no CVA tenderness   Lungs:     Clear to auscultation bilaterally, respirations unlabored   Chest wall: No tenderness or deformity   Heart:    Regular rate and rhythm, S1 and S2 normal, no murmur, rub   or gallop   Abdomen:     Soft, non-tender, bowel sounds active   Extremities:   Extremities normal, atraumatic, no cyanosis, +2 edema bilaterally   Skin:   Skin color, texture, turgor normal, no rashes or lesions   Lymph nodes:   Cervical normal   Neurologic:   CNII-XII intact            Lab, Imaging and other studies: I have personally reviewed pertinent labs  CBC:   Lab Results   Component Value Date    WBC 5 13 03/02/2018    HGB 9 3 (L) 03/02/2018    HCT 30 9 (L) 03/02/2018    MCV 85 03/02/2018     (L) 03/02/2018    MCH 25 7 (L) 03/02/2018    MCHC 30 1 (L) 03/02/2018    RDW 18 8 (H) 03/02/2018    MPV 10 4 03/02/2018     CMP:   Lab Results   Component Value Date     03/02/2018    K 5 0 03/02/2018     03/02/2018    CO2 25 03/02/2018    ANIONGAP 8 03/02/2018    BUN 51 (H) 03/02/2018    CREATININE 2 03 (H) 03/02/2018    GLUCOSE 132 03/02/2018    CALCIUM 7 7 (L) 03/02/2018    EGFR 28 03/02/2018             Results from last 7 days  Lab Units 03/02/18  0445 03/01/18  0454 02/28/18  0450 02/27/18  1321   SODIUM mmol/L 137 138 147* 142   POTASSIUM mmol/L 5 0 4 6 4 2 4 9   CHLORIDE mmol/L 104 103 110* 105   CO2 mmol/L 25 26 26 28   BUN mg/dL 51* 49* 47* 53*   CREATININE mg/dL 2 03* 2 13* 1 78* 1 99*   CALCIUM mg/dL 7 7* 7 9* 8 0* 8 8   TOTAL PROTEIN g/dL  --   --  6 0* 7 4   BILIRUBIN TOTAL mg/dL  --   --  0 50 0 60   ALK PHOS U/L  --   --  74 93   ALT U/L  --   --  18 20   AST U/L  --   --  22 24   GLUCOSE RANDOM mg/dL 132 107 44* 147*         Phosphorus: No results found for: PHOS  Magnesium: No results found for: MG  Urinalysis: No results found for: COLORU, CLARITYU, SPECGRAV, PHUR, LEUKOCYTESUR, NITRITE, PROTEINUA, GLUCOSEU, KETONESU, BILIRUBINUR, BLOODU  Ionized Calcium: No results found for: CAION  Coagulation: No results found for: PT, INR, APTT  Troponin: No results found for: TROPONINI  ABG: No results found for: PHART, MWI5ICM, PO2ART, FZT8HCQ, Q0YTRELB, BEART, SOURCE  Radiology review:     IMAGING  Procedure: Ct Abdomen Pelvis Wo Contrast    Result Date: 2/27/2018  Narrative: CT ABDOMEN AND PELVIS WITHOUT IV CONTRAST INDICATION: diarhea abdm distention  History taken directly from the electronic ordering system  COMPARISON: None  TECHNIQUE:  CT examination of the abdomen and pelvis was performed without intravenous contrast   Axial, sagittal, and coronal 2D reformatted images were created from the source data and submitted for interpretation  Radiation dose length product (DLP) for this visit:  721 9 mGy-cm   This examination, like all CT scans performed in the [facility], was performed utilizing techniques to minimize radiation dose exposure, including the use of iterative reconstruction and automated exposure control  Enteric contrast was not administered  FINDINGS: ABDOMEN LOWER CHEST:  Bilateral pleural effusions, right greater than left  Atelectatic versus consolidative changes at the left base  LIVER/BILIARY TREE:  Within limitations of noncontrast, no focal lesion  GALLBLADDER:  No calcified gallstones  No pericholecystic inflammatory change  SPLEEN:  Unremarkable  PANCREAS:  Much of the pancreas is atrophic  ADRENAL GLANDS:  Unremarkable  KIDNEYS/URETERS:  Simple appearing left renal cysts  6 mm catheter in the distal left ureter just proximal to the UVJ  Mild left hydroureter without substantial hydronephrosis  Punctate calculus in the superior pole of the left kidney  STOMACH AND BOWEL:  No evidence for bowel obstruction  Diverticulosis  No clear evidence for diverticulitis or colitis  APPENDIX:  No findings to suggest appendicitis  ABDOMINOPELVIC CAVITY:  Mild abdominal ascites  Scattered lymph nodes in the retroperitoneum which are nonspecific  VESSELS:  Atherosclerotic changes are present  No evidence of aneurysm   PELVIS REPRODUCTIVE ORGANS:  Prostatomegaly URINARY BLADDER:  Bladder is slightly thick-walled  Correlate with UA for cystitis in the possibility of chronic bladder outlet obstruction  ABDOMINAL WALL/INGUINAL REGIONS:  Anasarca  OSSEOUS STRUCTURES:  No acute fracture or destructive osseous lesion  Impression: 6 mm calculus in the distal left ureter just proximal to the UVJ resulting in mild left-sided hydroureter but no significant hydronephrosis  Bilateral pleural effusions, right greater than left with atelectatic versus consolidative change at the left base  Thick-walled bladder  Could reflect cystitis versus sequela chronic outlet obstruction  Mild abdominal ascites  Workstation performed: [id number]     Procedure: Xr Chest 1 View Portable    Result Date: 2/27/2018  Narrative: CHEST INDICATION:  Shortness of breath  diarrhea COMPARISON:  October 4, 2017  EXAM PERFORMED/VIEWS:  XR CHEST PORTABLE FINDINGS: Heart enlarged  ICD present  Pulmonary vessels prominent and indistinct  Opacification in the lower left hemithorax, probably a combination of pleural effusion and atelectasis or consolidation in the left lower lobe  Right lung clear  No pneumothorax  Osseous structures appear within normal limits for patient age  Impression: 1  Cardiomegaly and pulmonary vascular congestion  2   Probable small left pleural effusion and atelectasis or consolidation in the left lower lobe  Workstation performed: [id number]     Procedure: Us Kidney And Bladder    Result Date: 2/28/2018  Narrative: RENAL ULTRASOUND INDICATION: RENAL FAILURE, ACUTE RENAL FAILURE, CHRONIC arf, crf COMPARISON: CT 2/27/2018 TECHNIQUE:   Ultrasound of the retroperitoneum was performed with a curvilinear transducer utilizing volumetric sweeps and still imaging techniques  FINDINGS: KIDNEYS: Symmetric and normal size  Right kidney:  9 6 cm  Left kidney:  9 8 cm  Right kidney The renal parenchyma is diffusely echogenic consistent with medical renal disease  No suspicious masses detected   No hydronephrosis  No shadowing calculi  No perinephric fluid collections  Left kidney The renal parenchyma is diffusely echogenic consistent with medical renal disease  No suspicious masses detected  Left renal simple cysts  No hydronephrosis  No shadowing calculi  No perinephric fluid collections  URETERS: Nonvisualized  BLADDER: Diffuse bladder wall thickening could relate to cystitis or outlet obstruction changes, correlate clinically  4 7 x 2 9 x 3 6 cm prostate protrudes into the base of the bladder  Small amount of ascites noted in the left upper abdominal quadrant as on CT  Impression: 1  Bilateral medical renal disease  2   No hydronephrosis  3   Left renal cysts  4   Diffuse bladder wall thickening could relate to cystitis or outlet obstruction changes, correlate clinically  4 7 x 2 9 x 3 6 cm prostate protrudes into the base of the bladder   Workstation performed: [id number]       Current Facility-Administered Medications   Medication Dose Route Frequency    acetaminophen (TYLENOL) tablet 650 mg  650 mg Oral Q6H PRN    aspirin chewable tablet 81 mg  81 mg Oral Daily    atorvastatin (LIPITOR) tablet 40 mg  40 mg Oral Daily With Dinner    b complex-vitamin C-folic acid (NEPHROCAPS) capsule 1 capsule  1 capsule Oral Daily With Dinner    budesonide-formoterol (SYMBICORT) 160-4 5 mcg/act inhaler 2 puff  2 puff Inhalation BID    cefTRIAXone (ROCEPHIN) IVPB (premix) 1,000 mg  1,000 mg Intravenous Q24H    fluticasone (FLONASE) 50 mcg/act nasal spray 1 spray  1 spray Nasal Daily    heparin (porcine) subcutaneous injection 5,000 Units  5,000 Units Subcutaneous Q8H [address]    insulin lispro (HumaLOG) 100 units/mL subcutaneous injection 1-6 Units  1-6 Units Subcutaneous TID AC    insulin lispro (HumaLOG) 100 units/mL subcutaneous injection 1-6 Units  1-6 Units Subcutaneous HS    levalbuterol (XOPENEX) inhalation solution 1 25 mg  1 25 mg Nebulization Q6H PRN    levothyroxine tablet 125 mcg  125 mcg Oral Early Morning    metoprolol tartrate (LOPRESSOR) tablet 25 mg  25 mg Oral BID    ondansetron (ZOFRAN) injection 4 mg  4 mg Intravenous Q6H PRN    sodium chloride 0 9 % inhalation solution 3 mL  3 mL Nebulization Q6H PRN    tamsulosin (FLOMAX) capsule 0 4 mg  0 4 mg Oral Daily With Dinner    traZODone (DESYREL) tablet 50 mg  50 mg Oral HS     Medications Discontinued During This Encounter   Medication Reason    aspirin 81 MG tablet     metoprolol tartrate (LOPRESSOR) 50 mg tablet Dose adjustment    simvastatin (ZOCOR) 40 mg tablet Dose adjustment    albuterol (PROVENTIL HFA,VENTOLIN HFA) 90 mcg/act inhaler     allopurinol (ZYLOPRIM) 100 mg tablet     Calcium Carbonate (CALCIUM 600 HIGH POTENCY PO)     DULoxetine (CYMBALTA) 30 mg delayed release capsule     fluticasone-salmeterol (ADVAIR) 250-50 mcg/dose inhaler     furosemide (LASIX) 40 mg tablet     glipiZIDE (GLUCOTROL) 5 mg tablet     indomethacin (INDOCIN) 50 mg capsule     IRON PO     Linagliptin (TRADJENTA) 5 MG TABS     losartan (COZAAR) 50 mg tablet     metolazone (ZAROXOLYN) 5 mg tablet     Misc Natural Products (TART CHERRY ADVANCED PO)     Nutritional Supplements (VITAMIN D BOOSTER PO)     oxyCODONE-acetaminophen (PERCOCET) 5-325 mg per tablet     potassium chloride (MICRO-K) 10 MEQ CR capsule     POTASSIUM GLUCONATE PO     vitamin E, tocopherol, 400 units capsule     sodium chloride 0 9 % infusion     bumetanide (BUMEX) tablet 1 mg     finasteride (PROSCAR) tablet 5 mg     hydrOXYzine HCL (ATARAX) tablet 25 mg     zolpidem (AMBIEN) tablet 5 mg     fluticasone (FLONASE) 50 mcg/act nasal spray 2 spray     acetaminophen (TYLENOL) tablet 650 mg     aspirin tablet 325 mg     pantoprazole (PROTONIX) EC tablet 20 mg     pravastatin (PRAVACHOL) tablet 40 mg        Rodriguez Georgina, DO

## 2018-03-02 NOTE — PHYSICAL THERAPY NOTE
PT treatment Note      03/02/18 1124   Restrictions/Precautions   Other Precautions Chair Alarm  (Contact/isolation; Bed Alarm;Multiple lines;Telemetry;O2;Fall)   Subjective   Subjective Reports  he is tired and didn't sleep well  Agreeable to therapy  c/o fatigue during ambulation  Bed Mobility   Additional Comments OOB in chair at start and end of PT session  Transfers   Sit to Stand 4  Minimal assistance   Additional items Assist x 1; Armrests; Increased time required;Verbal cues   Stand to Sit 4  Minimal assistance   Additional items Assist x 1; Armrests; Verbal cues   Stand pivot 4  Minimal assistance   Additional items Assist x 1;Verbal cues  (cues for direction)   Ambulation/Elevation   Gait pattern Excessively slow  (Antalgic; Forward Flexion; Wide LA)   Gait Assistance 4  Minimal assist   Assistive Device Rolling walker   Distance 150' with frequent standing rest breaks, mild SOB  SpO2 98-97% with 3 L  (returned to 2 5 L at rest)   Balance   Static Sitting Good   Dynamic Sitting Fair +   Static Standing Fair   Dynamic Standing [id number]  (with RW)   Endurance Deficit   Endurance Deficit Yes   Activity Tolerance   Activity Tolerance Patient limited by fatigue   Assessment   Prognosis Good   Problem List Decreased strength;Decreased endurance; Impaired balance;Decreased mobility; Decreased safety awareness   Assessment Pt  performing transfers and ambulation at (min) x1 level of function  Ambulates with fair balance    Decreased endurance, required frequent standing rest breaks with SOB  Fair stability with RW  Decreased safety awareness with transfers increasing fall risk  Pt  will benefit from conitnued PT to address deficits in order to maximize return to PLOF  Plan   Treatment/Interventions Functional transfer training;LE strengthening/ROM; Therapeutic exercise; Endurance training;Bed mobility;Gait training   Progress Progressing toward goals   Recommendation   Recommendation Short-term skilled PT  (return to SNF)   Pt  OOB with call bell within reach and alarm on at end of PT session

## 2018-03-02 NOTE — PROGRESS NOTES
[address] Internal Medicine Progress Note  Patient: Anastacio Quinones 80 y o  male   MRN: [id number]  PCP: Luz Corado MD  Unit/Bed#: 414-21 Encounter: [id number]  Date Of Visit: 03/02/18    Assessment:    Principal Problem:    Ureterolithiasis  Active Problems:    Kidney stone    Stage 3 chronic kidney disease    COPD (chronic obstructive pulmonary disease) ([facility] [address] )    Hyperlipidemia    Essential hypertension    Type 2 diabetes mellitus (HCC)    Systolic congestive heart failure (HCC)    Elevated troponin I level    Acute kidney injury superimposed on chronic kidney disease (HCC)    Thrombocytopenia (HCC)    Leukopenia    Non-ST elevation myocardial infarction (NSTEMI), type 2 (HCC)    Severe aortic stenosis      Plan:    · Ureterolithiasis  · Continue with medical management, Flomax  · Possible UTI, would complete 14 days of antibiotics, continue with IV ceftriaxone 1 g Q 24 hours, today is day 4 of antibiotic therapy  · Acute kidney injury/chronic kidney disease stage 3  · Additional Liter of normal saline ordered by Nephrology team  · Monitor fluid status very closely  · Chronic systolic congestive heart failure  · Given patient's chronic systolic CHF as well as severe aortic stenosis, fluid balance is very tenuous  · Continue with continuous pulse oximetry  · Severe aortic stenosis/severe valvular heart disease  · Patient is not a good candidate for transcatheter aortic valve replacement, patient will be discharged with home hospice per the patient and family wishes  VTE Pharmacologic Prophylaxis:   Pharmacologic: Enoxaparin (Lovenox)  Mechanical VTE Prophylaxis in Place: Yes    Discussions with Specialists or Other Care Team Provider/Patient/Family:  Family updated via telephone,  they requested a home hospice evaluation  Time Spent for Care: 45 minutes  More than 50% of total time spent on counseling and coordination of care as described above      Current Length of Stay: 3 day(s)    Current Patient Status: Inpatient     Code Status: Level 1 - Full Code      Subjective:   Patient reports minimal lower abdominal pain after eating, denies any flank pain, no dysuria  Patient  becomes dyspneic with activity  Objective:     Vitals:   Temp (24hrs), Av 6 °F (36 4 °C), Min:97 5 °F (36 4 °C), Max:97 9 °F (36 6 °C)    HR:  [60-67] 64  Resp:  [18-26] 18  BP: (104-115)/(55-67) 115/63  SpO2:  [94 %-100 %] 98 %  Body mass index is 29 42 kg/m²  Input and Output Summary (last 24 hours): Intake/Output Summary (Last 24 hours) at 18 1408  Last data filed at 18 1335   Gross per 24 hour   Intake             1650 ml   Output              150 ml   Net             1500 ml       Physical Exam:     Physical Exam   Constitutional: He is oriented to person, place, and time  He appears well-developed and well-nourished  No distress  Chronically ill-appearing elderly male  HENT:   Head: Normocephalic and atraumatic  Mouth/Throat: Oropharynx is clear and moist  No oropharyngeal exudate  Cardiovascular: Normal rate, regular rhythm and intact distal pulses  No murmur heard  Pulmonary/Chest: Effort normal and breath sounds normal  No respiratory distress  He has no wheezes  Limited exam due to body habitus, but decreased breath sounds bilaterally  Musculoskeletal: Normal range of motion  Neurological: He is alert and oriented to person, place, and time  No cranial nerve deficit  Coordination normal    Skin: He is not diaphoretic  There is pallor  Nursing note and vitals reviewed        Additional Data:     Labs:      Results from last 7 days  Lab Units 18  0445 18  0454   WBC Thousand/uL 5 13 7 29   HEMOGLOBIN g/dL 9 3* 9 4*   HEMATOCRIT % 30 9* 32 3*   PLATELETS Thousands/uL 105* 114*   NEUTROS PCT %  --  66   LYMPHS PCT %  --  6*   LYMPHO PCT % 16  --    MONOS PCT %  --  26*   MONO PCT MAN % 11  --    EOS PCT %  --  2   EOSINO PCT MANUAL % 2  --        Results from last 7 days  Lab Units 03/02/18 0445  02/28/18  0450   SODIUM mmol/L 137  < > 147*   POTASSIUM mmol/L 5 0  < > 4 2   CHLORIDE mmol/L 104  < > 110*   CO2 mmol/L 25  < > 26   BUN mg/dL 51*  < > 47*   CREATININE mg/dL 2 03*  < > 1 78*   CALCIUM mg/dL 7 7*  < > 8 0*   TOTAL PROTEIN g/dL  --   --  6 0*   BILIRUBIN TOTAL mg/dL  --   --  0 50   ALK PHOS U/L  --   --  74   ALT U/L  --   --  18   AST U/L  --   --  22   GLUCOSE RANDOM mg/dL 132  < > 44*   < > = values in this interval not displayed  Results from last 7 days  Lab Units 02/28/18  0450   INR  1 39*       * I Have Reviewed All Lab Data Listed Above  * Additional Pertinent Lab Tests Reviewed: [address] Admission Reviewed      Recent Cultures (last 7 days):       Results from last 7 days  Lab Units 02/27/18 2001 02/27/18  1631   BLOOD CULTURE  No Growth at 48 hrs  No Growth at 48 hrs  --    URINE CULTURE   --  Culture results to follow    30,000-39,000 cfu/ml Proteus mirabilis*       Last 24 Hours Medication List:     Current Facility-Administered Medications:  acetaminophen 650 mg Oral Q6H PRN Phuong Cola, DO    aspirin 81 mg Oral Daily Phuong Cola, DO    atorvastatin 40 mg Oral Daily With Clarence Verdin DO    b complex-vitamin C-folic acid 1 capsule Oral Daily With Dinner Sherman Vogel PA-C    budesonide-formoterol 2 puff Inhalation BID Sherman Vogel PA-C    cefTRIAXone 1,000 mg Intravenous Q24H Phuong Cola, DO Last Rate: 1,000 mg (03/02/18 0844)   fluticasone 1 spray Nasal Daily Jerry Ochoa,     heparin (porcine) 5,000 Units Subcutaneous Q8H [address] Sherman Vogel PA-C    insulin lispro 1-6 Units Subcutaneous TID AC Sherman Vogel PA-C    insulin lispro 1-6 Units Subcutaneous HS Sherman Vogel PA-C    levalbuterol 1 25 mg Nebulization Q6H PRN Phuong Cola, DO    levothyroxine 125 mcg Oral Early Morning Sherman Vogel PA-C    metoprolol tartrate 25 mg Oral BID Jerry Ochoa DO    ondansetron 4 mg Intravenous Q6H PRN Sherman Vogel PA-C    sodium chloride 3 mL Nebulization Q6H PRN [employer], DO    tamsulosin 0 4 mg Oral Daily With Dinner Sherman Vogel PA-C    traZODone 50 mg Oral HS Sherman Vogel PA-C         Today, Patient Was Seen By: Manan Wolf DO

## 2018-03-03 PROBLEM — I27.20 PULMONARY HYPERTENSION (HCC): Status: ACTIVE | Noted: 2018-03-03

## 2018-03-03 PROBLEM — N18.9 ACUTE KIDNEY INJURY SUPERIMPOSED ON CHRONIC KIDNEY DISEASE (HCC): Status: RESOLVED | Noted: 2018-02-27 | Resolved: 2018-03-03

## 2018-03-03 PROBLEM — I50.22 CHRONIC SYSTOLIC CONGESTIVE HEART FAILURE (HCC): Status: ACTIVE | Noted: 2018-02-27

## 2018-03-03 PROBLEM — N17.9 ACUTE KIDNEY INJURY SUPERIMPOSED ON CHRONIC KIDNEY DISEASE (HCC): Status: RESOLVED | Noted: 2018-02-27 | Resolved: 2018-03-03

## 2018-03-03 PROBLEM — N30.00 ACUTE CYSTITIS: Status: ACTIVE | Noted: 2018-03-03

## 2018-03-03 PROBLEM — E03.9 HYPOTHYROIDISM: Status: ACTIVE | Noted: 2018-03-03

## 2018-03-03 PROBLEM — Z22.322 MRSA COLONIZATION: Status: ACTIVE | Noted: 2018-03-03

## 2018-03-03 PROBLEM — I34.0 MITRAL REGURGITATION: Status: ACTIVE | Noted: 2018-03-03

## 2018-03-03 PROBLEM — I35.1 AORTIC REGURGITATION: Status: ACTIVE | Noted: 2018-03-03

## 2018-03-03 PROBLEM — R79.89 ELEVATED TSH: Status: ACTIVE | Noted: 2018-03-03

## 2018-03-03 LAB
ALBUMIN SERPL BCP-MCNC: 2.8 G/DL (ref 3.5–5)
ALP SERPL-CCNC: 91 U/L (ref 46–116)
ALT SERPL W P-5'-P-CCNC: 20 U/L (ref 12–78)
ANION GAP SERPL CALCULATED.3IONS-SCNC: 8 MMOL/L (ref 4–13)
ANISOCYTOSIS BLD QL SMEAR: PRESENT
AST SERPL W P-5'-P-CCNC: 26 U/L (ref 5–45)
BASOPHILS # BLD MANUAL: 0 THOUSAND/UL (ref 0–0.1)
BASOPHILS NFR MAR MANUAL: 0 % (ref 0–1)
BILIRUB SERPL-MCNC: 0.4 MG/DL (ref 0.2–1)
BUN SERPL-MCNC: 50 MG/DL (ref 5–25)
CALCIUM SERPL-MCNC: 7.8 MG/DL (ref 8.3–10.1)
CHLORIDE SERPL-SCNC: 105 MMOL/L (ref 100–108)
CO2 SERPL-SCNC: 24 MMOL/L (ref 21–32)
CREAT SERPL-MCNC: 1.74 MG/DL (ref 0.6–1.3)
EOSINOPHIL # BLD MANUAL: 0.15 THOUSAND/UL (ref 0–0.4)
EOSINOPHIL NFR BLD MANUAL: 3 % (ref 0–6)
ERYTHROCYTE [DISTWIDTH] IN BLOOD BY AUTOMATED COUNT: 18.8 % (ref 11.6–15.1)
GFR SERPL CREATININE-BSD FRML MDRD: 34 ML/MIN/1.73SQ M
GLUCOSE SERPL-MCNC: 107 MG/DL (ref 65–140)
GLUCOSE SERPL-MCNC: 113 MG/DL (ref 65–140)
GLUCOSE SERPL-MCNC: 118 MG/DL (ref 65–140)
GLUCOSE SERPL-MCNC: 121 MG/DL (ref 65–140)
GLUCOSE SERPL-MCNC: 122 MG/DL (ref 65–140)
HCT VFR BLD AUTO: 32.6 % (ref 36.5–49.3)
HGB BLD-MCNC: 9.7 G/DL (ref 12–17)
HYPERCHROMIA BLD QL SMEAR: PRESENT
INR PPP: 1.29 (ref 0.86–1.16)
LYMPHOCYTES # BLD AUTO: 0.94 THOUSAND/UL (ref 0.6–4.47)
LYMPHOCYTES # BLD AUTO: 19 % (ref 14–44)
MAGNESIUM SERPL-MCNC: 2 MG/DL (ref 1.6–2.6)
MCH RBC QN AUTO: 25.3 PG (ref 26.8–34.3)
MCHC RBC AUTO-ENTMCNC: 29.8 G/DL (ref 31.4–37.4)
MCV RBC AUTO: 85 FL (ref 82–98)
MONOCYTES # BLD AUTO: 0.59 THOUSAND/UL (ref 0–1.22)
MONOCYTES NFR BLD: 12 % (ref 4–12)
NEUTROPHILS # BLD MANUAL: 3.27 THOUSAND/UL (ref 1.85–7.62)
NEUTS SEG NFR BLD AUTO: 66 % (ref 43–75)
PLATELET # BLD AUTO: 117 THOUSANDS/UL (ref 149–390)
PLATELET BLD QL SMEAR: ABNORMAL
PMV BLD AUTO: 10.3 FL (ref 8.9–12.7)
POLYCHROMASIA BLD QL SMEAR: PRESENT
POTASSIUM SERPL-SCNC: 5.2 MMOL/L (ref 3.5–5.3)
PROT SERPL-MCNC: 6.5 G/DL (ref 6.4–8.2)
PROTHROMBIN TIME: 16 SECONDS (ref 12.1–14.4)
RBC # BLD AUTO: 3.83 MILLION/UL (ref 3.88–5.62)
SODIUM SERPL-SCNC: 137 MMOL/L (ref 136–145)
TOTAL CELLS COUNTED SPEC: 100
WBC # BLD AUTO: 4.95 THOUSAND/UL (ref 4.31–10.16)

## 2018-03-03 PROCEDURE — 94640 AIRWAY INHALATION TREATMENT: CPT

## 2018-03-03 PROCEDURE — 85007 BL SMEAR W/DIFF WBC COUNT: CPT | Performed by: INTERNAL MEDICINE

## 2018-03-03 PROCEDURE — 85610 PROTHROMBIN TIME: CPT | Performed by: INTERNAL MEDICINE

## 2018-03-03 PROCEDURE — 85027 COMPLETE CBC AUTOMATED: CPT | Performed by: INTERNAL MEDICINE

## 2018-03-03 PROCEDURE — 94760 N-INVAS EAR/PLS OXIMETRY 1: CPT

## 2018-03-03 PROCEDURE — 99232 SBSQ HOSP IP/OBS MODERATE 35: CPT | Performed by: INTERNAL MEDICINE

## 2018-03-03 PROCEDURE — 82948 REAGENT STRIP/BLOOD GLUCOSE: CPT

## 2018-03-03 PROCEDURE — 80053 COMPREHEN METABOLIC PANEL: CPT | Performed by: INTERNAL MEDICINE

## 2018-03-03 PROCEDURE — 94762 N-INVAS EAR/PLS OXIMTRY CONT: CPT

## 2018-03-03 PROCEDURE — 83735 ASSAY OF MAGNESIUM: CPT | Performed by: INTERNAL MEDICINE

## 2018-03-03 RX ORDER — FENTANYL CITRATE 50 UG/ML
25 INJECTION, SOLUTION INTRAMUSCULAR; INTRAVENOUS ONCE
Status: COMPLETED | OUTPATIENT
Start: 2018-03-03 | End: 2018-03-03

## 2018-03-03 RX ORDER — LANOLIN ALCOHOL/MO/W.PET/CERES
3 CREAM (GRAM) TOPICAL
Status: DISCONTINUED | OUTPATIENT
Start: 2018-03-03 | End: 2018-03-12 | Stop reason: HOSPADM

## 2018-03-03 RX ORDER — LEVOTHYROXINE SODIUM 0.15 MG/1
150 TABLET ORAL
Status: DISCONTINUED | OUTPATIENT
Start: 2018-03-04 | End: 2018-03-12 | Stop reason: HOSPADM

## 2018-03-03 RX ADMIN — MUPIROCIN 1 APPLICATION: 20 OINTMENT TOPICAL at 22:23

## 2018-03-03 RX ADMIN — BUDESONIDE AND FORMOTEROL FUMARATE DIHYDRATE 2 PUFF: 160; 4.5 AEROSOL RESPIRATORY (INHALATION) at 08:31

## 2018-03-03 RX ADMIN — MELATONIN 3 MG: 3 TAB ORAL at 22:23

## 2018-03-03 RX ADMIN — Medication 1 CAPSULE: at 16:54

## 2018-03-03 RX ADMIN — ASPIRIN 81 MG 81 MG: 81 TABLET ORAL at 08:30

## 2018-03-03 RX ADMIN — METOPROLOL TARTRATE 25 MG: 25 TABLET ORAL at 17:06

## 2018-03-03 RX ADMIN — METOPROLOL TARTRATE 25 MG: 25 TABLET ORAL at 08:36

## 2018-03-03 RX ADMIN — LEVOTHYROXINE SODIUM 125 MCG: 125 TABLET ORAL at 05:03

## 2018-03-03 RX ADMIN — ONDANSETRON 4 MG: 2 INJECTION INTRAMUSCULAR; INTRAVENOUS at 21:14

## 2018-03-03 RX ADMIN — HEPARIN SODIUM 5000 UNITS: 5000 INJECTION, SOLUTION INTRAVENOUS; SUBCUTANEOUS at 22:23

## 2018-03-03 RX ADMIN — ACETAMINOPHEN 650 MG: 325 TABLET, FILM COATED ORAL at 08:30

## 2018-03-03 RX ADMIN — LEVALBUTEROL 1.25 MG: 1.25 SOLUTION, CONCENTRATE RESPIRATORY (INHALATION) at 13:02

## 2018-03-03 RX ADMIN — HEPARIN SODIUM 5000 UNITS: 5000 INJECTION, SOLUTION INTRAVENOUS; SUBCUTANEOUS at 05:03

## 2018-03-03 RX ADMIN — TAMSULOSIN HYDROCHLORIDE 0.4 MG: 0.4 CAPSULE ORAL at 16:54

## 2018-03-03 RX ADMIN — FLUTICASONE PROPIONATE 1 SPRAY: 50 SPRAY, METERED NASAL at 08:31

## 2018-03-03 RX ADMIN — ATORVASTATIN CALCIUM 40 MG: 40 TABLET, FILM COATED ORAL at 16:54

## 2018-03-03 RX ADMIN — ACETAMINOPHEN 650 MG: 325 TABLET, FILM COATED ORAL at 22:24

## 2018-03-03 RX ADMIN — TRAZODONE HYDROCHLORIDE 50 MG: 50 TABLET ORAL at 22:23

## 2018-03-03 RX ADMIN — HEPARIN SODIUM 5000 UNITS: 5000 INJECTION, SOLUTION INTRAVENOUS; SUBCUTANEOUS at 14:25

## 2018-03-03 RX ADMIN — ISODIUM CHLORIDE 3 ML: 0.03 SOLUTION RESPIRATORY (INHALATION) at 13:02

## 2018-03-03 RX ADMIN — FENTANYL CITRATE 25 MCG: 50 INJECTION INTRAMUSCULAR; INTRAVENOUS at 23:28

## 2018-03-03 RX ADMIN — MUPIROCIN 1 APPLICATION: 20 OINTMENT TOPICAL at 12:29

## 2018-03-03 RX ADMIN — CEFTRIAXONE 1000 MG: 1 INJECTION, SOLUTION INTRAVENOUS at 17:06

## 2018-03-03 RX ADMIN — BUDESONIDE AND FORMOTEROL FUMARATE DIHYDRATE 2 PUFF: 160; 4.5 AEROSOL RESPIRATORY (INHALATION) at 21:13

## 2018-03-03 NOTE — PROGRESS NOTES
Progress Note - Nephrology   Swathi Stark 80 y o  male MRN: [id number]  Unit/Bed#: 411-01 Encounter: [id number]    A/P:  1  RADAMES on top of CKD               Patient's cause of RADAMES a little difficult to ascertain at this time  He has mild hydronephrosis and we cannot blame the entirety of RADAMES on this finding, there may be a small component of reduced renal function due to this anatomic issue  His history would suggest a volume depleted state with n-v-d, however, on objective physical exam, radiologic and by BNP he appears slightly volume overloaded at this time  I would continue to address possible  infection (cystitis), ceftriaxone a safer and more appropriate medication  Continue with supportive care, I will check urine studies at this time for completeness sake of work up  3/1: Renal function is a little worse this AM, I will give 1L of NS today with gentle infusion rate of 75 ml/hr  Patient's FeNa is <1%, given severe cardiac valvulopathy, he will likely benefit from a little more volume expansion given overall clinical findings and probable positive hemodynamic response  3/2: Patient with minimal response to IVF volume expansion  He could tolerate more volume expansion and I will offer another 1L of NS today  Continue to closely monitor the patient's volume status as he appears to have a small ability to tolerate too much volume expansion as it will lead into CHF               3/3: Receiving IVF for probable volume depletion  Would hold IVF if he is drinking due to history of CHF  Check labs in the morning  2  CKD III with baseline Cr ~1 4 mg/dL  3  Hypernatremia -resolved  4  HTN + CKD + CHF -patient appears compensated at this time  5  Cystitis               Follow up cultures, continue with ceftriaxone  Culture remains pending  6  Nephrolithiasis with mild right sided hydroureteronephrosis              No intervention from Urology at this time, we will continue to treat medically    7  Severe pulmonary HTN  8  Mod to severe mitral regurgitation      Follow up reason for today's visit: RADAMES/CKD    Ureterolithiasis    Patient Active Problem List   Diagnosis    Kidney stone    Stage 3 chronic kidney disease    COPD (chronic obstructive pulmonary disease) (HCC)    Hyperlipidemia    Essential hypertension    Type 2 diabetes mellitus (HCC)    Systolic congestive heart failure (HCC)    Elevated troponin I level    Acute kidney injury superimposed on chronic kidney disease (HCC)    Ureterolithiasis    Thrombocytopenia (HCC)    Leukopenia    Non-ST elevation myocardial infarction (NSTEMI), type 2 (HCC)    Severe aortic stenosis    MRSA colonization         Subjective:     Patient without acute events overnight  Objective:     Vitals: Blood pressure 146/65, pulse 62, temperature 97 7 °F (36 5 °C), temperature source Temporal, resp  rate 18, height 5' 5" (1 651 m), weight 79 3 kg (174 lb 13 2 oz), SpO2 98 %  ,Body mass index is 29 09 kg/m²  Weight (last 2 days)     Date/Time   Weight    03/03/18 0600  79 3 (174 83)    03/02/18 0552  80 2 (176 81)                Intake/Output Summary (Last 24 hours) at 03/03/18 1139  Last data filed at 03/03/18 0801   Gross per 24 hour   Intake             2000 ml   Output               50 ml   Net             1950 ml     I/O last 3 completed shifts: In: 3143 [P O :760;  I V :1000]  Out: 150 [Urine:150]         Physical Exam: /65 (BP Location: Left arm)   Pulse 62   Temp 97 7 °F (36 5 °C) (Temporal)   Resp 18   Ht 5' 5" (1 651 m)   Wt 79 3 kg (174 lb 13 2 oz)   SpO2 98%   BMI 29 09 kg/m²     General Appearance:    Alert, cooperative, no distress, appears stated age   Head:    Normocephalic, without obvious abnormality, atraumatic   Eyes:    Conjunctiva/corneas clear   Ears:    Normal external ears   Nose:   Nares normal, septum midline, mucosa normal, no drainage    or sinus tenderness   Throat:   Lips, mucosa, and tongue normal; teeth and gums normal   Neck:   Supple   Back:     Symmetric, no curvature, ROM normal, no CVA tenderness   Lungs:     Clear to auscultation bilaterally, respirations unlabored   Chest wall:    No tenderness or deformity   Heart:    Regular rate and rhythm, S1 and S2 normal, no murmur, rub   or gallop   Abdomen:     Soft, non-tender, bowel sounds active   Extremities:   Extremities normal, atraumatic, no cyanosis, +2 edema bilaterally   Skin:   Skin color, texture, turgor normal, no rashes or lesions   Lymph nodes:   Cervical normal   Neurologic:   CNII-XII intact            Lab, Imaging and other studies: I have personally reviewed pertinent labs  CBC:   Lab Results   Component Value Date    WBC 4 95 03/03/2018    HGB 9 7 (L) 03/03/2018    HCT 32 6 (L) 03/03/2018    MCV 85 03/03/2018     (L) 03/03/2018    MCH 25 3 (L) 03/03/2018    MCHC 29 8 (L) 03/03/2018    RDW 18 8 (H) 03/03/2018    MPV 10 3 03/03/2018     CMP:   Lab Results   Component Value Date     03/03/2018    K 5 2 03/03/2018     03/03/2018    CO2 24 03/03/2018    ANIONGAP 8 03/03/2018    BUN 50 (H) 03/03/2018    CREATININE 1 74 (H) 03/03/2018    GLUCOSE 118 03/03/2018    CALCIUM 7 8 (L) 03/03/2018    AST 26 03/03/2018    ALT 20 03/03/2018    ALKPHOS 91 03/03/2018    PROT 6 5 03/03/2018    BILITOT 0 40 03/03/2018    EGFR 34 03/03/2018             Results from last 7 days  Lab Units 03/03/18  0416 03/02/18  0445 03/01/18  0454 02/28/18  0450 02/27/18  1321   SODIUM mmol/L 137 137 138 147* 142   POTASSIUM mmol/L 5 2 5 0 4 6 4 2 4 9   CHLORIDE mmol/L 105 104 103 110* 105   CO2 mmol/L 24 25 26 26 28   BUN mg/dL 50* 51* 49* 47* 53*   CREATININE mg/dL 1 74* 2 03* 2 13* 1 78* 1 99*   CALCIUM mg/dL 7 8* 7 7* 7 9* 8 0* 8 8   TOTAL PROTEIN g/dL 6 5  --   --  6 0* 7 4   BILIRUBIN TOTAL mg/dL 0 40  --   --  0 50 0 60   ALK PHOS U/L 91  --   --  74 93   ALT U/L 20  --   --  18 20   AST U/L 26  --   --  22 24   GLUCOSE RANDOM mg/dL 118 132 107 44* 147* Phosphorus: No results found for: PHOS  Magnesium:   Lab Results   Component Value Date    MG 2 0 03/03/2018     Urinalysis: No results found for: Tedra Ely, SPECGRAV, PHUR, LEUKOCYTESUR, NITRITE, PROTEINUA, GLUCOSEU, KETONESU, BILIRUBINUR, BLOODU  Ionized Calcium: No results found for: CAION  Coagulation:   Lab Results   Component Value Date    INR 1 29 (H) 03/03/2018     Troponin: No results found for: TROPONINI  ABG: No results found for: PHART, HOQ0UFF, PO2ART, AKJ5HPH, M1PIEXLP, BEART, SOURCE  Radiology review:     IMAGING  Procedure: Ct Abdomen Pelvis Wo Contrast    Result Date: 2/27/2018  Narrative: CT ABDOMEN AND PELVIS WITHOUT IV CONTRAST INDICATION: diarhea abdm distention  History taken directly from the electronic ordering system  COMPARISON: None  TECHNIQUE:  CT examination of the abdomen and pelvis was performed without intravenous contrast   Axial, sagittal, and coronal 2D reformatted images were created from the source data and submitted for interpretation  Radiation dose length product (DLP) for this visit:  721 9 mGy-cm   This examination, like all CT scans performed in the [facility], was performed utilizing techniques to minimize radiation dose exposure, including the use of iterative reconstruction and automated exposure control  Enteric contrast was not administered  FINDINGS: ABDOMEN LOWER CHEST:  Bilateral pleural effusions, right greater than left  Atelectatic versus consolidative changes at the left base  LIVER/BILIARY TREE:  Within limitations of noncontrast, no focal lesion  GALLBLADDER:  No calcified gallstones  No pericholecystic inflammatory change  SPLEEN:  Unremarkable  PANCREAS:  Much of the pancreas is atrophic  ADRENAL GLANDS:  Unremarkable  KIDNEYS/URETERS:  Simple appearing left renal cysts  6 mm catheter in the distal left ureter just proximal to the UVJ  Mild left hydroureter without substantial hydronephrosis    Punctate calculus in the superior pole of the left kidney  STOMACH AND BOWEL:  No evidence for bowel obstruction  Diverticulosis  No clear evidence for diverticulitis or colitis  APPENDIX:  No findings to suggest appendicitis  ABDOMINOPELVIC CAVITY:  Mild abdominal ascites  Scattered lymph nodes in the retroperitoneum which are nonspecific  VESSELS:  Atherosclerotic changes are present  No evidence of aneurysm  PELVIS REPRODUCTIVE ORGANS:  Prostatomegaly URINARY BLADDER:  Bladder is slightly thick-walled  Correlate with UA for cystitis in the possibility of chronic bladder outlet obstruction  ABDOMINAL WALL/INGUINAL REGIONS:  Anasarca  OSSEOUS STRUCTURES:  No acute fracture or destructive osseous lesion  Impression: 6 mm calculus in the distal left ureter just proximal to the UVJ resulting in mild left-sided hydroureter but no significant hydronephrosis  Bilateral pleural effusions, right greater than left with atelectatic versus consolidative change at the left base  Thick-walled bladder  Could reflect cystitis versus sequela chronic outlet obstruction  Mild abdominal ascites  Workstation performed: [id number]     Procedure: Xr Chest 1 View Portable    Result Date: 2/27/2018  Narrative: CHEST INDICATION:  Shortness of breath  diarrhea COMPARISON:  October 4, 2017  EXAM PERFORMED/VIEWS:  XR CHEST PORTABLE FINDINGS: Heart enlarged  ICD present  Pulmonary vessels prominent and indistinct  Opacification in the lower left hemithorax, probably a combination of pleural effusion and atelectasis or consolidation in the left lower lobe  Right lung clear  No pneumothorax  Osseous structures appear within normal limits for patient age  Impression: 1  Cardiomegaly and pulmonary vascular congestion  2   Probable small left pleural effusion and atelectasis or consolidation in the left lower lobe   Workstation performed: [id number]     Procedure: Us Kidney And Bladder    Result Date: 2/28/2018  Narrative: RENAL ULTRASOUND INDICATION: RENAL FAILURE, ACUTE RENAL FAILURE, CHRONIC arf, crf COMPARISON: CT 2/27/2018 TECHNIQUE:   Ultrasound of the retroperitoneum was performed with a curvilinear transducer utilizing volumetric sweeps and still imaging techniques  FINDINGS: KIDNEYS: Symmetric and normal size  Right kidney:  9 6 cm  Left kidney:  9 8 cm  Right kidney The renal parenchyma is diffusely echogenic consistent with medical renal disease  No suspicious masses detected  No hydronephrosis  No shadowing calculi  No perinephric fluid collections  Left kidney The renal parenchyma is diffusely echogenic consistent with medical renal disease  No suspicious masses detected  Left renal simple cysts  No hydronephrosis  No shadowing calculi  No perinephric fluid collections  URETERS: Nonvisualized  BLADDER: Diffuse bladder wall thickening could relate to cystitis or outlet obstruction changes, correlate clinically  4 7 x 2 9 x 3 6 cm prostate protrudes into the base of the bladder  Small amount of ascites noted in the left upper abdominal quadrant as on CT  Impression: 1  Bilateral medical renal disease  2   No hydronephrosis  3   Left renal cysts  4   Diffuse bladder wall thickening could relate to cystitis or outlet obstruction changes, correlate clinically  4 7 x 2 9 x 3 6 cm prostate protrudes into the base of the bladder   Workstation performed: [id number]       Current Facility-Administered Medications   Medication Dose Route Frequency    acetaminophen (TYLENOL) tablet 650 mg  650 mg Oral Q6H PRN    aspirin chewable tablet 81 mg  81 mg Oral Daily    atorvastatin (LIPITOR) tablet 40 mg  40 mg Oral Daily With Dinner    b complex-vitamin C-folic acid (NEPHROCAPS) capsule 1 capsule  1 capsule Oral Daily With Dinner    budesonide-formoterol (SYMBICORT) 160-4 5 mcg/act inhaler 2 puff  2 puff Inhalation BID    fluticasone (FLONASE) 50 mcg/act nasal spray 1 spray  1 spray Nasal Daily    heparin (porcine) subcutaneous injection 5,000 Units 5,000 Units Subcutaneous Q8H [facility]    insulin lispro (HumaLOG) 100 units/mL subcutaneous injection 1-6 Units  1-6 Units Subcutaneous TID AC    insulin lispro (HumaLOG) 100 units/mL subcutaneous injection 1-6 Units  1-6 Units Subcutaneous HS    levalbuterol (XOPENEX) inhalation solution 1 25 mg  1 25 mg Nebulization Q6H PRN    levothyroxine tablet 125 mcg  125 mcg Oral Early Morning    metoprolol tartrate (LOPRESSOR) tablet 25 mg  25 mg Oral BID    mupirocin (BACTROBAN) 2 % nasal ointment 1 application  1 application Nasal S67B [facility]    ondansetron (ZOFRAN) injection 4 mg  4 mg Intravenous Q6H PRN    sodium chloride 0 9 % inhalation solution 3 mL  3 mL Nebulization Q6H PRN    tamsulosin (FLOMAX) capsule 0 4 mg  0 4 mg Oral Daily With Dinner    traZODone (DESYREL) tablet 50 mg  50 mg Oral HS     Medications Discontinued During This Encounter   Medication Reason    aspirin 81 MG tablet     metoprolol tartrate (LOPRESSOR) 50 mg tablet Dose adjustment    simvastatin (ZOCOR) 40 mg tablet Dose adjustment    albuterol (PROVENTIL HFA,VENTOLIN HFA) 90 mcg/act inhaler     allopurinol (ZYLOPRIM) 100 mg tablet     Calcium Carbonate (CALCIUM 600 HIGH POTENCY PO)     DULoxetine (CYMBALTA) 30 mg delayed release capsule     fluticasone-salmeterol (ADVAIR) 250-50 mcg/dose inhaler     furosemide (LASIX) 40 mg tablet     glipiZIDE (GLUCOTROL) 5 mg tablet     indomethacin (INDOCIN) 50 mg capsule     IRON PO     Linagliptin (TRADJENTA) 5 MG TABS     losartan (COZAAR) 50 mg tablet     metolazone (ZAROXOLYN) 5 mg tablet     Misc Natural Products (TART CHERRY ADVANCED PO)     Nutritional Supplements (VITAMIN D BOOSTER PO)     oxyCODONE-acetaminophen (PERCOCET) 5-325 mg per tablet     potassium chloride (MICRO-K) 10 MEQ CR capsule     POTASSIUM GLUCONATE PO     vitamin E, tocopherol, 400 units capsule     sodium chloride 0 9 % infusion     bumetanide (BUMEX) tablet 1 mg     finasteride (PROSCAR) tablet 5 mg     hydrOXYzine HCL (ATARAX) tablet 25 mg     zolpidem (AMBIEN) tablet 5 mg     fluticasone (FLONASE) 50 mcg/act nasal spray 2 spray     acetaminophen (TYLENOL) tablet 650 mg     aspirin tablet 325 mg     pantoprazole (PROTONIX) EC tablet 20 mg     pravastatin (PRAVACHOL) tablet 40 mg     cefTRIAXone (ROCEPHIN) IVPB (premix) 1,000 mg        Annika Rogers MD

## 2018-03-03 NOTE — PROGRESS NOTES
Progress Note - Sarah Alejandro 80 y o  male MRN: [id number]    Unit/Bed#: 411-01 Encounter: [id number]      Assessment:  Patient Active Problem List   Diagnosis    Kidney stone    CKD (chronic kidney disease), stage III    COPD (chronic obstructive pulmonary disease) (HCC)    Hyperlipidemia    Essential hypertension    Type 2 diabetes mellitus (HCC)    Chronic systolic congestive heart failure (HCC)    Elevated troponin I level    Acute kidney injury (HCC)    Ureterolithiasis    Thrombocytopenia (HCC)    Leukopenia    Non-ST elevation myocardial infarction (NSTEMI), type 2 (HCC)    Severe aortic stenosis    MRSA colonization    Elevated TSH    Acute cystitis    Pulmonary hypertension    Mitral regurgitation    Aortic regurgitation    Hypothyroidism         Plan:  1)  Ureterolithiasis/Acute kidney injury (present on admission) on CKD stage 3:  The IV fluids were discontinued by Nephrology today, 3/3/18  Continue to monitor the patient's renal function and electrolytes  Continue Flomax  2)  Acute cystitis secondary to Proteus mirabilis:  Continue IV Ceftriaxone  3)  Hypothyroidism/Elevated TSH:  Increase Levothyroxine to 150 micrograms PO Qdaily in the early morning  Recheck a TSH level in 4-6 weeks  4)  Chronic systolic CHF/Reduced right ventricular function/Moderate-severe mitral regurgitation/Severe aortic stenosis/Moderate aortic regurgitation/Moderate tricuspid regurgitation/Pericardial effusion/Severe pulmonary hypertension:  Monitor the patient's fluid status closely  5)  MRSA colonization:  Nasal bactroban (Mupirocin) BID for 5 days  He will need MRSA de-colonization  Subjective:   Patient seen and examined  The patient complains of shortness of breath  Objective:     Vitals: Blood pressure 146/65, pulse 62, temperature 97 7 °F (36 5 °C), temperature source Temporal, resp  rate 18, height 5' 5" (1 651 m), weight 79 3 kg (174 lb 13 2 oz), SpO2 94 %  ,Body mass index is 29 09 kg/m²    Weight (last 2 days)     Date/Time   Weight    03/03/18 0600  79 3 (174 83)    03/02/18 0552  80 2 (176 81)              Intake/Output Summary (Last 24 hours) at 03/03/18 1457  Last data filed at 03/03/18 1200   Gross per 24 hour   Intake             2060 ml   Output               75 ml   Net             1985 ml       Physical Exam: General:  NAD, confused at times, follows commands  HEENT:  NC/AT, mucous membranes moist  Neck:  Supple, No JVP elevation  CV:  + S1, + S2, RRR  Pulm:  Bibasilar crackles  Abd:  Soft, Non-tender, Non-distended  Ext:  No clubbing/cyanosis/edema    Scheduled Meds:  Current Facility-Administered Medications:  acetaminophen 650 mg Oral Q6H PRN [employer], DO   aspirin 81 mg Oral Daily [employer], DO   atorvastatin 40 mg Oral Daily With Clarence Verdin, DO   b complex-vitamin C-folic acid 1 capsule Oral Daily With Dinner Sherman Vogel PA-C   budesonide-formoterol 2 puff Inhalation BID Sherman Vogel PA-C   fluticasone 1 spray Nasal Daily [employer], DO   heparin (porcine) 5,000 Units Subcutaneous Q8H [address] Sherman Vogel PA-C   insulin lispro 1-6 Units Subcutaneous TID AC Sherman Vogel PA-C   insulin lispro 1-6 Units Subcutaneous HS Sherman Vogel PA-C   levalbuterol 1 25 mg Nebulization Q6H PRN [employer], DO   [START ON 3/4/2018] levothyroxine 150 mcg Oral Early Morning [employer], DO   metoprolol tartrate 25 mg Oral BID [employer], DO   mupirocin 1 application Nasal [id number] [address] Jerry Ochoa, DO   ondansetron 4 mg Intravenous Q6H PRN Sherman Vogel PA-C   sodium chloride 3 mL Nebulization Q6H PRN [employer], DO   tamsulosin 0 4 mg Oral Daily With Dinner Sherman Vogel PA-C   traZODone 50 mg Oral HS Sherman Vogel PA-C     Continuous Infusions:   PRN Meds:   acetaminophen    levalbuterol    ondansetron    sodium chloride      Invasive Devices     Peripheral Intravenous Line            Peripheral IV 03/01/18 Right Antecubital 1 day                  Results from last 7 days  Lab Units 03/03/18  0416 03/02/18  0445 03/01/18  0454  02/27/18  1321   WBC Thousand/uL 4 95 5 13 7 29  < > 4 03*   HEMOGLOBIN g/dL 9 7* 9 3* 9 4*  < > 11 3*   HEMATOCRIT % 32 6* 30 9* 32 3*  < > 37 8   PLATELETS Thousands/uL 117* 105* 114*  < > 139*   NEUTROS PCT %  --   --  66  --  46   MONOS PCT %  --   --  26*  --  40*   MONO PCT MAN % 12 11  --   --   --    < > = values in this interval not displayed  Results from last 7 days  Lab Units 03/03/18  0416 03/02/18 0445 03/01/18 0454 02/28/18  0450 02/27/18  1321   SODIUM mmol/L 137 137 138 147* 142   POTASSIUM mmol/L 5 2 5 0 4 6 4 2 4 9   CHLORIDE mmol/L 105 104 103 110* 105   CO2 mmol/L 24 25 26 26 28   BUN mg/dL 50* 51* 49* 47* 53*   CREATININE mg/dL 1 74* 2 03* 2 13* 1 78* 1 99*   CALCIUM mg/dL 7 8* 7 7* 7 9* 8 0* 8 8   TOTAL PROTEIN g/dL 6 5  --   --  6 0* 7 4   BILIRUBIN TOTAL mg/dL 0 40  --   --  0 50 0 60   ALK PHOS U/L 91  --   --  74 93   ALT U/L 20  --   --  18 20   AST U/L 26  --   --  22 24   GLUCOSE RANDOM mg/dL 118 132 107 44* 147*       Lab Results   Component Value Date    CKTOTAL 73 02/28/2018    TROPONINI 0 07 (HH) 02/28/2018       Lab Results   Component Value Date    INR 1 29 (H) 03/03/2018    INR 1 39 (H) 02/28/2018    INR 1 25 (H) 02/27/2018    PROTIME 16 0 (H) 03/03/2018    PROTIME 17 0 (H) 02/28/2018    PROTIME 15 6 (H) 02/27/2018                 Lab, Imaging and other studies: I have personally reviewed pertinent reports      VTE Pharmacologic Prophylaxis: Heparin  VTE Mechanical Prophylaxis: sequential compression device

## 2018-03-04 LAB
ALBUMIN SERPL BCP-MCNC: 3 G/DL (ref 3.5–5)
ALP SERPL-CCNC: 101 U/L (ref 46–116)
ALT SERPL W P-5'-P-CCNC: 21 U/L (ref 12–78)
ANION GAP SERPL CALCULATED.3IONS-SCNC: 6 MMOL/L (ref 4–13)
ANION GAP SERPL CALCULATED.3IONS-SCNC: 9 MMOL/L (ref 4–13)
ANISOCYTOSIS BLD QL SMEAR: PRESENT
AST SERPL W P-5'-P-CCNC: 22 U/L (ref 5–45)
BASOPHILS # BLD MANUAL: 0 THOUSAND/UL (ref 0–0.1)
BASOPHILS NFR MAR MANUAL: 0 % (ref 0–1)
BILIRUB SERPL-MCNC: 0.5 MG/DL (ref 0.2–1)
BUN SERPL-MCNC: 52 MG/DL (ref 5–25)
BUN SERPL-MCNC: 53 MG/DL (ref 5–25)
CA-I BLD-SCNC: 1.17 MMOL/L (ref 1.12–1.32)
CALCIUM SERPL-MCNC: 8.3 MG/DL (ref 8.3–10.1)
CALCIUM SERPL-MCNC: 8.4 MG/DL (ref 8.3–10.1)
CHLORIDE SERPL-SCNC: 104 MMOL/L (ref 100–108)
CHLORIDE SERPL-SCNC: 105 MMOL/L (ref 100–108)
CO2 SERPL-SCNC: 25 MMOL/L (ref 21–32)
CO2 SERPL-SCNC: 27 MMOL/L (ref 21–32)
CREAT SERPL-MCNC: 1.83 MG/DL (ref 0.6–1.3)
CREAT SERPL-MCNC: 1.85 MG/DL (ref 0.6–1.3)
EOSINOPHIL # BLD MANUAL: 0 THOUSAND/UL (ref 0–0.4)
EOSINOPHIL NFR BLD MANUAL: 0 % (ref 0–6)
ERYTHROCYTE [DISTWIDTH] IN BLOOD BY AUTOMATED COUNT: 19 % (ref 11.6–15.1)
GFR SERPL CREATININE-BSD FRML MDRD: 32 ML/MIN/1.73SQ M
GFR SERPL CREATININE-BSD FRML MDRD: 32 ML/MIN/1.73SQ M
GLUCOSE SERPL-MCNC: 115 MG/DL (ref 65–140)
GLUCOSE SERPL-MCNC: 121 MG/DL (ref 65–140)
GLUCOSE SERPL-MCNC: 122 MG/DL (ref 65–140)
GLUCOSE SERPL-MCNC: 136 MG/DL (ref 65–140)
GLUCOSE SERPL-MCNC: 139 MG/DL (ref 65–140)
GLUCOSE SERPL-MCNC: 143 MG/DL (ref 65–140)
HCT VFR BLD AUTO: 34.1 % (ref 36.5–49.3)
HGB BLD-MCNC: 10.2 G/DL (ref 12–17)
HYPERCHROMIA BLD QL SMEAR: PRESENT
LYMPHOCYTES # BLD AUTO: 1.36 THOUSAND/UL (ref 0.6–4.47)
LYMPHOCYTES # BLD AUTO: 28 % (ref 14–44)
MAGNESIUM SERPL-MCNC: 2.2 MG/DL (ref 1.6–2.6)
MCH RBC QN AUTO: 25.4 PG (ref 26.8–34.3)
MCHC RBC AUTO-ENTMCNC: 29.9 G/DL (ref 31.4–37.4)
MCV RBC AUTO: 85 FL (ref 82–98)
MONOCYTES # BLD AUTO: 1.02 THOUSAND/UL (ref 0–1.22)
MONOCYTES NFR BLD: 21 % (ref 4–12)
NEUTROPHILS # BLD MANUAL: 2.48 THOUSAND/UL (ref 1.85–7.62)
NEUTS SEG NFR BLD AUTO: 51 % (ref 43–75)
PHOSPHATE SERPL-MCNC: 4.1 MG/DL (ref 2.3–4.1)
PLATELET # BLD AUTO: 124 THOUSANDS/UL (ref 149–390)
PLATELET BLD QL SMEAR: ABNORMAL
PMV BLD AUTO: 10.1 FL (ref 8.9–12.7)
POLYCHROMASIA BLD QL SMEAR: PRESENT
POTASSIUM SERPL-SCNC: 5.4 MMOL/L (ref 3.5–5.3)
POTASSIUM SERPL-SCNC: 5.8 MMOL/L (ref 3.5–5.3)
PROT SERPL-MCNC: 6.7 G/DL (ref 6.4–8.2)
RBC # BLD AUTO: 4.01 MILLION/UL (ref 3.88–5.62)
SODIUM SERPL-SCNC: 138 MMOL/L (ref 136–145)
SODIUM SERPL-SCNC: 138 MMOL/L (ref 136–145)
TOTAL CELLS COUNTED SPEC: 100
TROPONIN I SERPL-MCNC: 0.03 NG/ML
WBC # BLD AUTO: 4.87 THOUSAND/UL (ref 4.31–10.16)

## 2018-03-04 PROCEDURE — 84100 ASSAY OF PHOSPHORUS: CPT | Performed by: INTERNAL MEDICINE

## 2018-03-04 PROCEDURE — 80048 BASIC METABOLIC PNL TOTAL CA: CPT | Performed by: INTERNAL MEDICINE

## 2018-03-04 PROCEDURE — 82948 REAGENT STRIP/BLOOD GLUCOSE: CPT

## 2018-03-04 PROCEDURE — 99232 SBSQ HOSP IP/OBS MODERATE 35: CPT | Performed by: PHYSICIAN ASSISTANT

## 2018-03-04 PROCEDURE — 85027 COMPLETE CBC AUTOMATED: CPT | Performed by: INTERNAL MEDICINE

## 2018-03-04 PROCEDURE — 83735 ASSAY OF MAGNESIUM: CPT | Performed by: INTERNAL MEDICINE

## 2018-03-04 PROCEDURE — 94762 N-INVAS EAR/PLS OXIMTRY CONT: CPT

## 2018-03-04 PROCEDURE — 80053 COMPREHEN METABOLIC PANEL: CPT | Performed by: INTERNAL MEDICINE

## 2018-03-04 PROCEDURE — 82330 ASSAY OF CALCIUM: CPT | Performed by: INTERNAL MEDICINE

## 2018-03-04 PROCEDURE — 84484 ASSAY OF TROPONIN QUANT: CPT | Performed by: INTERNAL MEDICINE

## 2018-03-04 PROCEDURE — 94640 AIRWAY INHALATION TREATMENT: CPT

## 2018-03-04 PROCEDURE — 94760 N-INVAS EAR/PLS OXIMETRY 1: CPT

## 2018-03-04 PROCEDURE — 85007 BL SMEAR W/DIFF WBC COUNT: CPT | Performed by: INTERNAL MEDICINE

## 2018-03-04 RX ORDER — SODIUM POLYSTYRENE SULFONATE 15 G/60ML
15 SUSPENSION ORAL; RECTAL ONCE
Status: COMPLETED | OUTPATIENT
Start: 2018-03-04 | End: 2018-03-04

## 2018-03-04 RX ORDER — SODIUM CHLORIDE 9 MG/ML
50 INJECTION, SOLUTION INTRAVENOUS CONTINUOUS
Status: DISCONTINUED | OUTPATIENT
Start: 2018-03-04 | End: 2018-03-05

## 2018-03-04 RX ADMIN — TAMSULOSIN HYDROCHLORIDE 0.4 MG: 0.4 CAPSULE ORAL at 17:06

## 2018-03-04 RX ADMIN — METOPROLOL TARTRATE 25 MG: 25 TABLET ORAL at 17:07

## 2018-03-04 RX ADMIN — METOPROLOL TARTRATE 25 MG: 25 TABLET ORAL at 08:17

## 2018-03-04 RX ADMIN — HEPARIN SODIUM 5000 UNITS: 5000 INJECTION, SOLUTION INTRAVENOUS; SUBCUTANEOUS at 13:44

## 2018-03-04 RX ADMIN — ISODIUM CHLORIDE 3 ML: 0.03 SOLUTION RESPIRATORY (INHALATION) at 22:20

## 2018-03-04 RX ADMIN — TRAZODONE HYDROCHLORIDE 50 MG: 50 TABLET ORAL at 21:05

## 2018-03-04 RX ADMIN — LEVALBUTEROL 1.25 MG: 1.25 SOLUTION, CONCENTRATE RESPIRATORY (INHALATION) at 22:20

## 2018-03-04 RX ADMIN — Medication 1 CAPSULE: at 17:06

## 2018-03-04 RX ADMIN — BUDESONIDE AND FORMOTEROL FUMARATE DIHYDRATE 2 PUFF: 160; 4.5 AEROSOL RESPIRATORY (INHALATION) at 08:08

## 2018-03-04 RX ADMIN — ACETAMINOPHEN 650 MG: 325 TABLET, FILM COATED ORAL at 12:22

## 2018-03-04 RX ADMIN — ASPIRIN 81 MG 81 MG: 81 TABLET ORAL at 08:17

## 2018-03-04 RX ADMIN — ATORVASTATIN CALCIUM 40 MG: 40 TABLET, FILM COATED ORAL at 17:06

## 2018-03-04 RX ADMIN — SODIUM POLYSTYRENE SULFONATE 15 G: 15 SUSPENSION ORAL; RECTAL at 08:17

## 2018-03-04 RX ADMIN — BUDESONIDE AND FORMOTEROL FUMARATE DIHYDRATE 2 PUFF: 160; 4.5 AEROSOL RESPIRATORY (INHALATION) at 21:06

## 2018-03-04 RX ADMIN — MELATONIN 3 MG: 3 TAB ORAL at 21:05

## 2018-03-04 RX ADMIN — HEPARIN SODIUM 5000 UNITS: 5000 INJECTION, SOLUTION INTRAVENOUS; SUBCUTANEOUS at 21:05

## 2018-03-04 RX ADMIN — CEFTRIAXONE 1000 MG: 1 INJECTION, SOLUTION INTRAVENOUS at 17:06

## 2018-03-04 RX ADMIN — MUPIROCIN 1 APPLICATION: 20 OINTMENT TOPICAL at 21:05

## 2018-03-04 RX ADMIN — LEVOTHYROXINE SODIUM 150 MCG: 150 TABLET ORAL at 06:07

## 2018-03-04 RX ADMIN — SODIUM CHLORIDE 50 ML/HR: 0.9 INJECTION, SOLUTION INTRAVENOUS at 08:17

## 2018-03-04 RX ADMIN — FLUTICASONE PROPIONATE 1 SPRAY: 50 SPRAY, METERED NASAL at 08:10

## 2018-03-04 RX ADMIN — HEPARIN SODIUM 5000 UNITS: 5000 INJECTION, SOLUTION INTRAVENOUS; SUBCUTANEOUS at 06:08

## 2018-03-04 RX ADMIN — MUPIROCIN 1 APPLICATION: 20 OINTMENT TOPICAL at 08:29

## 2018-03-04 NOTE — CASE MANAGEMENT
Continued Stay Review  Caden Castillo  [id number]  Attending [facility] internal medicine - Dr Gilford Lennox    Date: 3/4/2018    Vital Signs: /56 (BP Location: Right arm)   Pulse 66   Temp (!) 97 3 °F (36 3 °C) (Temporal)   Resp 20   Ht 5' 5" (1 651 m)   Wt 79 2 kg (174 lb 9 7 oz)   SpO2 98%   BMI 29 06 kg/m²     Medications:   Scheduled Meds:   Current Facility-Administered Medications:  acetaminophen 650 mg Oral Q6H PRN Unruly Palma,     aspirin 81 mg Oral Daily Unruly Palma, DO    atorvastatin 40 mg Oral Daily With Clarence Verdin DO    b complex-vitamin C-folic acid 1 capsule Oral Daily With Dinner Sherman Vogel PA-C    budesonide-formoterol 2 puff Inhalation BID Sherman Vogel PA-C    cefTRIAXone 1,000 mg Intravenous Q24H Unruly Palma DO Last Rate: 1,000 mg (03/03/18 1706)   fluticasone 1 spray Nasal Daily Jerry Ochoa DO    heparin (porcine) 5,000 Units Subcutaneous Q8H [address] Sherman Vogel PA-C    insulin lispro 1-6 Units Subcutaneous TID AC Sherman Vogel PA-C    insulin lispro 1-6 Units Subcutaneous HS Sherman Vogel PA-C    levalbuterol 1 25 mg Nebulization Q6H PRN Unruly Palma DO    levothyroxine 150 mcg Oral Early Morning Jerry Ochoa DO    melatonin 3 mg Oral HS Unruly Palma,     metoprolol tartrate 25 mg Oral BID Unruly Palma,     mupirocin 1 application Nasal [id number] [address] Jerry Ochoa DO    ondansetron 4 mg Intravenous Q6H PRN Sherman Vogel PA-C    sodium chloride 50 mL/hr Intravenous Continuous Unruly Palma DO Last Rate: 50 mL/hr (03/04/18 0817)   sodium chloride 3 mL Nebulization Q6H PRN Unruly Palma DO    tamsulosin 0 4 mg Oral Daily With Dinner Sherman Vogel PA-C    traZODone 50 mg Oral HS Sherman Vogel PA-C      Continuous Infusions:   sodium chloride 50 mL/hr Last Rate: 50 mL/hr (03/04/18 0817)     PRN Meds:   Acetaminophen - used x 1    levalbuterol    ondansetron    sodium chloride    Abnormal Labs/Diagnostic Results:   Serial glucose qid - 143;  136  K 5 8  Bun 52  Creatinine 1 83  Albumin 3  hgb 10 2, hct 34 1  Platelets 674  Age/Sex: 80 y o  male     1  Assessment/Plan: Ureterolithiasis/Acute kidney injury(POA)/Chronic kidney disease stage 3: the patient's renal function worsen today  Patient was restarted on gentle IV fluids NS 50 ml/hr  Repeat labs in am  Continue Flomax       2  Acute cystitis secondary to proteus mirabilis: Continue IV Ceftriaxone  Today is day 6       3  Hypothyroidism/Elevated TSH: continue with increased dose of Levothyroxine of 150 mcg PO Qdaily in the early morning  Recheck a TSH level in 4-6 weeks      4  Chronic systolic CHF/Reduced right ventricular function/Moderate-severe mitral regurgitation/Severe aortic stenosis/Moderate aortic regurgitation/Moderate tricuspid regurgitation/Pericardial effusion/Severe pulmonary hypertension: Monitor the patient's fluid status closely  Continuous pulse oximetry       MRSA colonization: Nasal Bactroban BID  Day 2/5  He will need MRSA de-colonization  Discharge Plan: To be determined  Thank you,  [address] [place] [address] in the [facility] by Sony Giordano for 2017  Network Utilization Review Department  Phone: [phone]; Fax [phone]  ATTENTION: The Network Utilization Review Department is now centralized for our 7 Facilities  Make a note that we have a new phone and fax numbers for our Department  Please call with any questions or concerns to [phone] and carefully follow the prompts so that you are directed to the right person  All voicemails are confidential  Fax any determinations, approvals, denials, and requests for initial or continue stay review clinical to [phone]  Due to HIGH CALL volume, it would be easier if you could please send faxed requests to expedite your requests and in part, help us provide discharge notifications faster

## 2018-03-04 NOTE — PROGRESS NOTES
[facility] Progress Note  Patient: Dianna Olvera 80 y o  male   MRN: [id number]  PCP: Cathy Brand MD  Unit/Bed#: 104-65 Encounter: [id number]  Date Of Visit: 03/04/18    Assessment:    Principal Problem:    Ureterolithiasis  Active Problems:    Kidney stone    CKD (chronic kidney disease), stage III    COPD (chronic obstructive pulmonary disease) ([facility] )    Hyperlipidemia    Essential hypertension    Type 2 diabetes mellitus (HCC)    Chronic systolic congestive heart failure (HCC)    Elevated troponin I level    Acute kidney injury ([facility] )    Thrombocytopenia (HCC)    Leukopenia    Non-ST elevation myocardial infarction (NSTEMI), type 2 (HCC)    Severe aortic stenosis    MRSA colonization    Elevated TSH    Acute cystitis    Pulmonary hypertension    Mitral regurgitation    Aortic regurgitation    Hypothyroidism      Plan:    1  Ureterolithiasis/Acute kidney injury(POA)/Chronic kidney disease stage 3: the patient's renal function worsen today  Patient was restarted on gentle IV fluids NS 50 ml/hr  Repeat labs in am  Continue Flomax  2  Acute cystitis secondary to proteus mirabilis: Continue IV Ceftriaxone  Today is day 6      3  Hypothyroidism/Elevated TSH: continue with increased dose of Levothyroxine of 150 mcg PO Qdaily in the early morning  Recheck a TSH level in 4-6 weeks  4  Chronic systolic CHF/Reduced right ventricular function/Moderate-severe mitral regurgitation/Severe aortic stenosis/Moderate aortic regurgitation/Moderate tricuspid regurgitation/Pericardial effusion/Severe pulmonary hypertension: Monitor the patient's fluid status closely  Continuous pulse oximetry  5  MRSA colonization: Nasal Bactroban BID  Day 2/5  He will need MRSA de-colonization          VTE Pharmacologic Prophylaxis:   Pharmacologic: Heparin  Mechanical VTE Prophylaxis in Place: Yes      Discussions with Specialists or Other Care Team Provider: Nursing, CM, and attending    Education and Discussions with Family / Patient: n/a    Time Spent for Care: 30 minutes  More than 50% of total time spent on counseling and coordination of care as described above  Current Length of Stay: 5 day(s)    Current Patient Status: Inpatient   Certification Statement: The patient will continue to require additional inpatient hospital stay due to continued need for IV fluids and IV antibiotics  Discharge Plan / Estimated Discharge Date: Likely discharge home tomorrow Monday 3/5/18 with home hospice  Code Status: Level 1 - Full Code      Subjective: The patient was seen and examined  The patient denies any complaints  Objective:     Vitals:   Temp (24hrs), Av 1 °F (36 2 °C), Min:96 5 °F (35 8 °C), Max:97 5 °F (36 4 °C)    HR:  [61-66] 66  Resp:  [18-20] 20  BP: (106-124)/(56-77) 115/56  SpO2:  [93 %-100 %] 98 %  Body mass index is 29 06 kg/m²  Input and Output Summary (last 24 hours): Intake/Output Summary (Last 24 hours) at 18 1135  Last data filed at 18 0915   Gross per 24 hour   Intake              460 ml   Output              175 ml   Net              285 ml       Physical Exam:     Physical Exam   Constitutional: Vital signs are normal  He appears well-developed and well-nourished  He is sleeping and cooperative  He is easily aroused  Cardiovascular: Normal rate and regular rhythm  Murmur heard  Pulmonary/Chest: Effort normal  He has no wheezes  He has no rhonchi  He has rales in the right lower field and the left lower field  Abdominal: Soft  Normal appearance and bowel sounds are normal  There is no tenderness  Neurological: He is easily aroused  The patient was sleepy but easily aroused  He knew he was in the hospital but would no tell me which one  He would only answer a few questions  Skin: Skin is warm, dry and intact  Nursing note and vitals reviewed          Additional Data:     Labs:      Results from last 7 days  Lab Units 18  0606  18  0454   WBC Thousand/uL 4 87  < > 7 29   HEMOGLOBIN g/dL 10 2*  < > 9 4*   HEMATOCRIT % 34 1*  < > 32 3*   PLATELETS Thousands/uL 124*  < > 114*   NEUTROS PCT %  --   --  66   LYMPHS PCT %  --   --  6*   LYMPHO PCT % 28  < >  --    MONOS PCT %  --   --  26*   MONO PCT MAN % 21*  < >  --    EOS PCT %  --   --  2   EOSINO PCT MANUAL % 0  < >  --    < > = values in this interval not displayed  Results from last 7 days  Lab Units 03/04/18  0606   SODIUM mmol/L 138   POTASSIUM mmol/L 5 8*   CHLORIDE mmol/L 104   CO2 mmol/L 25   BUN mg/dL 52*   CREATININE mg/dL 1 83*   CALCIUM mg/dL 8 3   TOTAL PROTEIN g/dL 6 7   BILIRUBIN TOTAL mg/dL 0 50   ALK PHOS U/L 101   ALT U/L 21   AST U/L 22   GLUCOSE RANDOM mg/dL 115       Results from last 7 days  Lab Units 03/03/18  0416   INR  1 29*       * I Have Reviewed All Lab Data Listed Above  * Additional Pertinent Lab Tests Reviewed: All Labs Within Last 24 Hours Reviewed    Imaging:    Imaging Reports Reviewed Today Include: none  Imaging Personally Reviewed by Myself Includes:  none    Recent Cultures (last 7 days):       Results from last 7 days  Lab Units 02/27/18 2001 02/27/18  1631   BLOOD CULTURE  No Growth After 4 Days  No Growth After 4 Days  --    URINE CULTURE   --  Culture results to follow    30,000-39,000 cfu/ml Proteus mirabilis*       Last 24 Hours Medication List:     Current Facility-Administered Medications:  acetaminophen 650 mg Oral Q6H PRN [place] Peek, DO    aspirin 81 mg Oral Daily [place] Peek, DO    atorvastatin 40 mg Oral Daily With Clarence Verdin DO    b complex-vitamin C-folic acid 1 capsule Oral Daily With Dinner Sherman Vogel PA-C    budesonide-formoterol 2 puff Inhalation BID Sherman Vogel PA-C    cefTRIAXone 1,000 mg Intravenous Q24H [place] Peek, DO Last Rate: 1,000 mg (03/03/18 1706)   fluticasone 1 spray Nasal Daily Jerry Ochoa DO    heparin (porcine) 5,000 Units Subcutaneous Q8H [address] Sherman Vogel PA-C    insulin lispro 1-6 Units Subcutaneous TID AC Sherman Vogel PA-C    insulin lispro 1-6 Units Subcutaneous HS Sherman Vogel PA-C    levalbuterol 1 25 mg Nebulization Q6H PRN Chicho Neri, DO    levothyroxine 150 mcg Oral Early Morning Chicho Neri, DO    melatonin 3 mg Oral HS Chicho Neri, DO    metoprolol tartrate 25 mg Oral BID Chicho Neri, DO    mupirocin 1 application Nasal W99F [facility] Jerry Ochoa, DO    ondansetron 4 mg Intravenous Q6H PRN Sherman Vogel PA-C    sodium chloride 50 mL/hr Intravenous Continuous Chicho Neri, DO Last Rate: 50 mL/hr (03/04/18 0817)   sodium chloride 3 mL Nebulization Q6H PRN Chicho Neri, DO    tamsulosin 0 4 mg Oral Daily With Dinner Sherman Vogel PA-C    traZODone 50 mg Oral HS Sherman Vogel PA-C         Today, Patient Was Seen By: Puma Brush PA-C    ** Please Note: This note has been constructed using a voice recognition system   **

## 2018-03-04 NOTE — PROGRESS NOTES
Progress Note - Nephrology   Roberta Mendez 80 y o  male MRN: [id number]  Unit/Bed#: 411-01 Encounter: [id number]    A/P:  1  RADAMES on top of CKD               Patient's cause of RADAMES a little difficult to ascertain at this time  He has mild hydronephrosis and we cannot blame the entirety of RADAMES on this finding, there may be a small component of reduced renal function due to this anatomic issue  His history would suggest a volume depleted state with n-v-d, however, on objective physical exam, radiologic and by BNP he appears slightly volume overloaded at this time  I would continue to address possible  infection (cystitis), ceftriaxone a safer and more appropriate medication  Continue with supportive care, I will check urine studies at this time for completeness sake of work up  3/1: Renal function is a little worse this AM, I will give 1L of NS today with gentle infusion rate of 75 ml/hr  Patient's FeNa is <1%, given severe cardiac valvulopathy, he will likely benefit from a little more volume expansion given overall clinical findings and probable positive hemodynamic response  3/2: Patient with minimal response to IVF volume expansion  He could tolerate more volume expansion and I will offer another 1L of NS today  Continue to closely monitor the patient's volume status as he appears to have a small ability to tolerate too much volume expansion as it will lead into CHF               3/3: Receiving IVF for probable volume depletion  Would hold IVF if he is drinking due to history of CHF  Check labs in the morning              3/4: Renal function about the same - follow daily labs  2  CKD III with baseline Cr ~1 4 mg/dL  3  Hypernatremia -resolved  4  HTN + CKD + CHF -patient appears compensated at this time  5  Cystitis               Follow up cultures, continue with ceftriaxone  Culture remains pending    6  Nephrolithiasis with mild right sided hydroureteronephrosis              No intervention from Urology at this time, we will continue to treat medically  7  Severe pulmonary HTN  8  Mod to severe mitral regurgitation  9  Hyperkalemia" recheck and place potassium restriction in diet      Follow up reason for today's visit: RADAMES/CKD    Ureterolithiasis    Patient Active Problem List   Diagnosis    Kidney stone    CKD (chronic kidney disease), stage III    COPD (chronic obstructive pulmonary disease) (HCC)    Hyperlipidemia    Essential hypertension    Type 2 diabetes mellitus (HCC)    Chronic systolic congestive heart failure (HCC)    Elevated troponin I level    Acute kidney injury (HCC)    Ureterolithiasis    Thrombocytopenia (HCC)    Leukopenia    Non-ST elevation myocardial infarction (NSTEMI), type 2 (HCC)    Severe aortic stenosis    MRSA colonization    Elevated TSH    Acute cystitis    Pulmonary hypertension    Mitral regurgitation    Aortic regurgitation    Hypothyroidism         Subjective:     Patient without acute events overnight  Objective:     Vitals: Blood pressure 115/56, pulse 66, temperature (!) 97 3 °F (36 3 °C), temperature source Temporal, resp  rate 20, height 5' 5" (1 651 m), weight 79 2 kg (174 lb 9 7 oz), SpO2 96 %  ,Body mass index is 29 06 kg/m²  Weight (last 2 days)     Date/Time   Weight    03/04/18 0600  79 2 (174 6)    03/03/18 0600  79 3 (174 83)    03/02/18 0552  80 2 (176 81)                Intake/Output Summary (Last 24 hours) at 03/04/18 0928  Last data filed at 03/04/18 0344   Gross per 24 hour   Intake              460 ml   Output              175 ml   Net              285 ml     I/O last 3 completed shifts: In: 2040 [P O :1040;  I V :1000]  Out: 175 [Urine:175]         Physical Exam: /56 (BP Location: Right arm)   Pulse 66   Temp (!) 97 3 °F (36 3 °C) (Temporal)   Resp 20   Ht 5' 5" (1 651 m)   Wt 79 2 kg (174 lb 9 7 oz)   SpO2 96%   BMI 29 06 kg/m²     General Appearance:    Alert, cooperative, no distress, appears stated age Head:    Normocephalic, without obvious abnormality, atraumatic   Eyes:    Conjunctiva/corneas clear   Ears:    Normal external ears   Nose:   Nares normal, septum midline, mucosa normal, no drainage    or sinus tenderness   Throat:   Lips, mucosa, and tongue normal; teeth and gums normal   Neck:   Supple   Back:     Symmetric, no curvature, ROM normal, no CVA tenderness   Lungs:     Exp rhonchi bilaterally, respirations unlabored   Chest wall:    No tenderness or deformity   Heart:    Regular rate and rhythm, S1 and S2 normal, no murmur, rub   or gallop   Abdomen:     Soft, non-tender, bowel sounds active   Extremities:   Extremities normal, atraumatic, no cyanosis, +2 edema bilaterally 2+ edema   Skin:   Skin color, texture, turgor normal, no rashes or lesions   Lymph nodes:   Cervical normal   Neurologic:   CNII-XII intact            Lab, Imaging and other studies: I have personally reviewed pertinent labs  CBC:   Lab Results   Component Value Date    WBC 4 87 03/04/2018    HGB 10 2 (L) 03/04/2018    HCT 34 1 (L) 03/04/2018    MCV 85 03/04/2018     (L) 03/04/2018    MCH 25 4 (L) 03/04/2018    MCHC 29 9 (L) 03/04/2018    RDW 19 0 (H) 03/04/2018    MPV 10 1 03/04/2018     CMP:   Lab Results   Component Value Date     03/04/2018    K 5 8 (H) 03/04/2018     03/04/2018    CO2 25 03/04/2018    ANIONGAP 9 03/04/2018    BUN 52 (H) 03/04/2018    CREATININE 1 83 (H) 03/04/2018    GLUCOSE 115 03/04/2018    CALCIUM 8 3 03/04/2018    AST 22 03/04/2018    ALT 21 03/04/2018    ALKPHOS 101 03/04/2018    PROT 6 7 03/04/2018    BILITOT 0 50 03/04/2018    EGFR 32 03/04/2018             Results from last 7 days  Lab Units 03/04/18  0606 03/03/18  0416 03/02/18  0445  02/28/18  0450   SODIUM mmol/L 138 137 137  < > 147*   POTASSIUM mmol/L 5 8* 5 2 5 0  < > 4 2   CHLORIDE mmol/L 104 105 104  < > 110*   CO2 mmol/L 25 24 25  < > 26   BUN mg/dL 52* 50* 51*  < > 47*   CREATININE mg/dL 1 83* 1 74* 2 03*  < > 1 78* CALCIUM mg/dL 8 3 7 8* 7 7*  < > 8 0*   TOTAL PROTEIN g/dL 6 7 6 5  --   --  6 0*   BILIRUBIN TOTAL mg/dL 0 50 0 40  --   --  0 50   ALK PHOS U/L 101 91  --   --  74   ALT U/L 21 20  --   --  18   AST U/L 22 26  --   --  22   GLUCOSE RANDOM mg/dL 115 118 132  < > 44*   < > = values in this interval not displayed  Phosphorus:   Lab Results   Component Value Date    PHOS 4 1 03/04/2018     Magnesium:   Lab Results   Component Value Date    MG 2 2 03/04/2018     Urinalysis: No results found for: Ival Copier, SPECGRAV, PHUR, LEUKOCYTESUR, NITRITE, PROTEINUA, GLUCOSEU, KETONESU, BILIRUBINUR, BLOODU  Ionized Calcium: No results found for: CAION  Coagulation:   No results found for: PT, INR, APTT  Troponin:   Lab Results   Component Value Date    TROPONINI 0 03 03/04/2018     ABG: No results found for: PHART, ZOW7OOR, PO2ART, RDN5ZCS, K2WJXPEZ, BEART, SOURCE  Radiology review:     IMAGING  Procedure: Ct Abdomen Pelvis Wo Contrast    Result Date: 2/27/2018  Narrative: CT ABDOMEN AND PELVIS WITHOUT IV CONTRAST INDICATION: diarhea abdm distention  History taken directly from the electronic ordering system  COMPARISON: None  TECHNIQUE:  CT examination of the abdomen and pelvis was performed without intravenous contrast   Axial, sagittal, and coronal 2D reformatted images were created from the source data and submitted for interpretation  Radiation dose length product (DLP) for this visit:  721 9 mGy-cm   This examination, like all CT scans performed in the [facility], was performed utilizing techniques to minimize radiation dose exposure, including the use of iterative reconstruction and automated exposure control  Enteric contrast was not administered  FINDINGS: ABDOMEN LOWER CHEST:  Bilateral pleural effusions, right greater than left  Atelectatic versus consolidative changes at the left base  LIVER/BILIARY TREE:  Within limitations of noncontrast, no focal lesion   GALLBLADDER:  No calcified gallstones  No pericholecystic inflammatory change  SPLEEN:  Unremarkable  PANCREAS:  Much of the pancreas is atrophic  ADRENAL GLANDS:  Unremarkable  KIDNEYS/URETERS:  Simple appearing left renal cysts  6 mm catheter in the distal left ureter just proximal to the UVJ  Mild left hydroureter without substantial hydronephrosis  Punctate calculus in the superior pole of the left kidney  STOMACH AND BOWEL:  No evidence for bowel obstruction  Diverticulosis  No clear evidence for diverticulitis or colitis  APPENDIX:  No findings to suggest appendicitis  ABDOMINOPELVIC CAVITY:  Mild abdominal ascites  Scattered lymph nodes in the retroperitoneum which are nonspecific  VESSELS:  Atherosclerotic changes are present  No evidence of aneurysm  PELVIS REPRODUCTIVE ORGANS:  Prostatomegaly URINARY BLADDER:  Bladder is slightly thick-walled  Correlate with UA for cystitis in the possibility of chronic bladder outlet obstruction  ABDOMINAL WALL/INGUINAL REGIONS:  Anasarca  OSSEOUS STRUCTURES:  No acute fracture or destructive osseous lesion  Impression: 6 mm calculus in the distal left ureter just proximal to the UVJ resulting in mild left-sided hydroureter but no significant hydronephrosis  Bilateral pleural effusions, right greater than left with atelectatic versus consolidative change at the left base  Thick-walled bladder  Could reflect cystitis versus sequela chronic outlet obstruction  Mild abdominal ascites  Workstation performed: [id number]     Procedure: Xr Chest 1 View Portable    Result Date: 2/27/2018  Narrative: CHEST INDICATION:  Shortness of breath  diarrhea COMPARISON:  October 4, 2017  EXAM PERFORMED/VIEWS:  XR CHEST PORTABLE FINDINGS: Heart enlarged  ICD present  Pulmonary vessels prominent and indistinct  Opacification in the lower left hemithorax, probably a combination of pleural effusion and atelectasis or consolidation in the left lower lobe  Right lung clear  No pneumothorax   Osseous structures appear within normal limits for patient age  Impression: 1  Cardiomegaly and pulmonary vascular congestion  2   Probable small left pleural effusion and atelectasis or consolidation in the left lower lobe  Workstation performed: [id number]     Procedure: Us Kidney And Bladder    Result Date: 2/28/2018  Narrative: RENAL ULTRASOUND INDICATION: RENAL FAILURE, ACUTE RENAL FAILURE, CHRONIC arf, crf COMPARISON: CT 2/27/2018 TECHNIQUE:   Ultrasound of the retroperitoneum was performed with a curvilinear transducer utilizing volumetric sweeps and still imaging techniques  FINDINGS: KIDNEYS: Symmetric and normal size  Right kidney:  9 6 cm  Left kidney:  9 8 cm  Right kidney The renal parenchyma is diffusely echogenic consistent with medical renal disease  No suspicious masses detected  No hydronephrosis  No shadowing calculi  No perinephric fluid collections  Left kidney The renal parenchyma is diffusely echogenic consistent with medical renal disease  No suspicious masses detected  Left renal simple cysts  No hydronephrosis  No shadowing calculi  No perinephric fluid collections  URETERS: Nonvisualized  BLADDER: Diffuse bladder wall thickening could relate to cystitis or outlet obstruction changes, correlate clinically  4 7 x 2 9 x 3 6 cm prostate protrudes into the base of the bladder  Small amount of ascites noted in the left upper abdominal quadrant as on CT  Impression: 1  Bilateral medical renal disease  2   No hydronephrosis  3   Left renal cysts  4   Diffuse bladder wall thickening could relate to cystitis or outlet obstruction changes, correlate clinically  4 7 x 2 9 x 3 6 cm prostate protrudes into the base of the bladder   Workstation performed: [id number]       Current Facility-Administered Medications   Medication Dose Route Frequency    acetaminophen (TYLENOL) tablet 650 mg  650 mg Oral Q6H PRN    aspirin chewable tablet 81 mg  81 mg Oral Daily    atorvastatin (LIPITOR) tablet 40 mg 40 mg Oral Daily With Dinner    b complex-vitamin C-folic acid (NEPHROCAPS) capsule 1 capsule  1 capsule Oral Daily With Dinner    budesonide-formoterol (SYMBICORT) 160-4 5 mcg/act inhaler 2 puff  2 puff Inhalation BID    cefTRIAXone (ROCEPHIN) IVPB (premix) 1,000 mg  1,000 mg Intravenous Q24H    fluticasone (FLONASE) 50 mcg/act nasal spray 1 spray  1 spray Nasal Daily    heparin (porcine) subcutaneous injection 5,000 Units  5,000 Units Subcutaneous Q8H [facility]    insulin lispro (HumaLOG) 100 units/mL subcutaneous injection 1-6 Units  1-6 Units Subcutaneous TID AC    insulin lispro (HumaLOG) 100 units/mL subcutaneous injection 1-6 Units  1-6 Units Subcutaneous HS    levalbuterol (XOPENEX) inhalation solution 1 25 mg  1 25 mg Nebulization Q6H PRN    levothyroxine tablet 150 mcg  150 mcg Oral Early Morning    melatonin tablet 3 mg  3 mg Oral HS    metoprolol tartrate (LOPRESSOR) tablet 25 mg  25 mg Oral BID    mupirocin (BACTROBAN) 2 % nasal ointment 1 application  1 application Nasal [id number] [facility]    ondansetron (ZOFRAN) injection 4 mg  4 mg Intravenous Q6H PRN    sodium chloride 0 9 % infusion  50 mL/hr Intravenous Continuous    sodium chloride 0 9 % inhalation solution 3 mL  3 mL Nebulization Q6H PRN    tamsulosin (FLOMAX) capsule 0 4 mg  0 4 mg Oral Daily With Dinner    traZODone (DESYREL) tablet 50 mg  50 mg Oral HS     Medications Discontinued During This Encounter   Medication Reason    aspirin 81 MG tablet     metoprolol tartrate (LOPRESSOR) 50 mg tablet Dose adjustment    simvastatin (ZOCOR) 40 mg tablet Dose adjustment    albuterol (PROVENTIL HFA,VENTOLIN HFA) 90 mcg/act inhaler     allopurinol (ZYLOPRIM) 100 mg tablet     Calcium Carbonate (CALCIUM 600 HIGH POTENCY PO)     DULoxetine (CYMBALTA) 30 mg delayed release capsule     fluticasone-salmeterol (ADVAIR) 250-50 mcg/dose inhaler     furosemide (LASIX) 40 mg tablet     glipiZIDE (GLUCOTROL) 5 mg tablet     indomethacin (INDOCIN) 50 mg capsule     IRON PO     Linagliptin (TRADJENTA) 5 MG TABS     losartan (COZAAR) 50 mg tablet     metolazone (ZAROXOLYN) 5 mg tablet     Misc Natural Products (TART CHERRY ADVANCED PO)     Nutritional Supplements (VITAMIN D BOOSTER PO)     oxyCODONE-acetaminophen (PERCOCET) 5-325 mg per tablet     potassium chloride (MICRO-K) 10 MEQ CR capsule     POTASSIUM GLUCONATE PO     vitamin E, tocopherol, 400 units capsule     sodium chloride 0 9 % infusion     bumetanide (BUMEX) tablet 1 mg     finasteride (PROSCAR) tablet 5 mg     hydrOXYzine HCL (ATARAX) tablet 25 mg     zolpidem (AMBIEN) tablet 5 mg     fluticasone (FLONASE) 50 mcg/act nasal spray 2 spray     acetaminophen (TYLENOL) tablet 650 mg     aspirin tablet 325 mg     pantoprazole (PROTONIX) EC tablet 20 mg     pravastatin (PRAVACHOL) tablet 40 mg     cefTRIAXone (ROCEPHIN) IVPB (premix) 1,000 mg     levothyroxine tablet 125 mcg        Lidia Wyman MD

## 2018-03-05 ENCOUNTER — APPOINTMENT (INPATIENT)
Dept: RADIOLOGY | Facility: HOSPITAL | Age: 83
DRG: 693 | End: 2018-03-05
Payer: COMMERCIAL

## 2018-03-05 PROBLEM — I50.23 ACUTE ON CHRONIC SYSTOLIC CHF (CONGESTIVE HEART FAILURE) (HCC): Status: ACTIVE | Noted: 2018-02-27

## 2018-03-05 LAB
ALBUMIN SERPL BCP-MCNC: 2.9 G/DL (ref 3.5–5)
ALP SERPL-CCNC: 102 U/L (ref 46–116)
ALT SERPL W P-5'-P-CCNC: 21 U/L (ref 12–78)
ANION GAP SERPL CALCULATED.3IONS-SCNC: 9 MMOL/L (ref 4–13)
ANISOCYTOSIS BLD QL SMEAR: PRESENT
AST SERPL W P-5'-P-CCNC: 22 U/L (ref 5–45)
BACTERIA BLD CULT: NORMAL
BACTERIA BLD CULT: NORMAL
BASOPHILS # BLD MANUAL: 0 THOUSAND/UL (ref 0–0.1)
BASOPHILS NFR MAR MANUAL: 0 % (ref 0–1)
BILIRUB SERPL-MCNC: 0.4 MG/DL (ref 0.2–1)
BUN SERPL-MCNC: 53 MG/DL (ref 5–25)
CALCIUM SERPL-MCNC: 8.2 MG/DL (ref 8.3–10.1)
CHLORIDE SERPL-SCNC: 105 MMOL/L (ref 100–108)
CO2 SERPL-SCNC: 24 MMOL/L (ref 21–32)
CREAT SERPL-MCNC: 1.84 MG/DL (ref 0.6–1.3)
EOSINOPHIL # BLD MANUAL: 0.1 THOUSAND/UL (ref 0–0.4)
EOSINOPHIL NFR BLD MANUAL: 2 % (ref 0–6)
ERYTHROCYTE [DISTWIDTH] IN BLOOD BY AUTOMATED COUNT: 18.9 % (ref 11.6–15.1)
GFR SERPL CREATININE-BSD FRML MDRD: 32 ML/MIN/1.73SQ M
GLUCOSE SERPL-MCNC: 108 MG/DL (ref 65–140)
GLUCOSE SERPL-MCNC: 136 MG/DL (ref 65–140)
GLUCOSE SERPL-MCNC: 137 MG/DL (ref 65–140)
GLUCOSE SERPL-MCNC: 140 MG/DL (ref 65–140)
GLUCOSE SERPL-MCNC: 148 MG/DL (ref 65–140)
HCT VFR BLD AUTO: 33.1 % (ref 36.5–49.3)
HGB BLD-MCNC: 9.8 G/DL (ref 12–17)
HYPERCHROMIA BLD QL SMEAR: PRESENT
LYMPHOCYTES # BLD AUTO: 0.63 THOUSAND/UL (ref 0.6–4.47)
LYMPHOCYTES # BLD AUTO: 13 % (ref 14–44)
MCH RBC QN AUTO: 25.3 PG (ref 26.8–34.3)
MCHC RBC AUTO-ENTMCNC: 29.6 G/DL (ref 31.4–37.4)
MCV RBC AUTO: 85 FL (ref 82–98)
MONOCYTES # BLD AUTO: 0.39 THOUSAND/UL (ref 0–1.22)
MONOCYTES NFR BLD: 8 % (ref 4–12)
NEUTROPHILS # BLD MANUAL: 3.69 THOUSAND/UL (ref 1.85–7.62)
NEUTS SEG NFR BLD AUTO: 76 % (ref 43–75)
PLATELET # BLD AUTO: 116 THOUSANDS/UL (ref 149–390)
PLATELET BLD QL SMEAR: ABNORMAL
PMV BLD AUTO: 10.8 FL (ref 8.9–12.7)
POLYCHROMASIA BLD QL SMEAR: PRESENT
POTASSIUM SERPL-SCNC: 5.3 MMOL/L (ref 3.5–5.3)
PROT SERPL-MCNC: 6.5 G/DL (ref 6.4–8.2)
RBC # BLD AUTO: 3.88 MILLION/UL (ref 3.88–5.62)
SODIUM SERPL-SCNC: 138 MMOL/L (ref 136–145)
TOTAL CELLS COUNTED SPEC: 100
VARIANT LYMPHS # BLD AUTO: 1 %
WBC # BLD AUTO: 4.86 THOUSAND/UL (ref 4.31–10.16)

## 2018-03-05 PROCEDURE — 85027 COMPLETE CBC AUTOMATED: CPT | Performed by: INTERNAL MEDICINE

## 2018-03-05 PROCEDURE — 80053 COMPREHEN METABOLIC PANEL: CPT | Performed by: INTERNAL MEDICINE

## 2018-03-05 PROCEDURE — 85007 BL SMEAR W/DIFF WBC COUNT: CPT | Performed by: INTERNAL MEDICINE

## 2018-03-05 PROCEDURE — 99232 SBSQ HOSP IP/OBS MODERATE 35: CPT | Performed by: PHYSICIAN ASSISTANT

## 2018-03-05 PROCEDURE — 71045 X-RAY EXAM CHEST 1 VIEW: CPT

## 2018-03-05 PROCEDURE — 97530 THERAPEUTIC ACTIVITIES: CPT

## 2018-03-05 PROCEDURE — 94760 N-INVAS EAR/PLS OXIMETRY 1: CPT

## 2018-03-05 PROCEDURE — 82948 REAGENT STRIP/BLOOD GLUCOSE: CPT

## 2018-03-05 PROCEDURE — 94762 N-INVAS EAR/PLS OXIMTRY CONT: CPT

## 2018-03-05 PROCEDURE — 97110 THERAPEUTIC EXERCISES: CPT

## 2018-03-05 RX ORDER — FUROSEMIDE 10 MG/ML
40 INJECTION INTRAMUSCULAR; INTRAVENOUS ONCE
Status: COMPLETED | OUTPATIENT
Start: 2018-03-05 | End: 2018-03-05

## 2018-03-05 RX ADMIN — BUDESONIDE AND FORMOTEROL FUMARATE DIHYDRATE 2 PUFF: 160; 4.5 AEROSOL RESPIRATORY (INHALATION) at 21:11

## 2018-03-05 RX ADMIN — ATORVASTATIN CALCIUM 40 MG: 40 TABLET, FILM COATED ORAL at 17:07

## 2018-03-05 RX ADMIN — MUPIROCIN 1 APPLICATION: 20 OINTMENT TOPICAL at 08:17

## 2018-03-05 RX ADMIN — TRAZODONE HYDROCHLORIDE 50 MG: 50 TABLET ORAL at 21:17

## 2018-03-05 RX ADMIN — CEFTRIAXONE 1000 MG: 1 INJECTION, SOLUTION INTRAVENOUS at 17:07

## 2018-03-05 RX ADMIN — FLUTICASONE PROPIONATE 1 SPRAY: 50 SPRAY, METERED NASAL at 08:16

## 2018-03-05 RX ADMIN — METOPROLOL TARTRATE 25 MG: 25 TABLET ORAL at 08:16

## 2018-03-05 RX ADMIN — LEVOTHYROXINE SODIUM 150 MCG: 150 TABLET ORAL at 05:54

## 2018-03-05 RX ADMIN — MUPIROCIN 1 APPLICATION: 20 OINTMENT TOPICAL at 21:17

## 2018-03-05 RX ADMIN — Medication 1 CAPSULE: at 17:07

## 2018-03-05 RX ADMIN — ASPIRIN 81 MG 81 MG: 81 TABLET ORAL at 08:16

## 2018-03-05 RX ADMIN — ACETAMINOPHEN 650 MG: 325 TABLET, FILM COATED ORAL at 16:14

## 2018-03-05 RX ADMIN — HEPARIN SODIUM 5000 UNITS: 5000 INJECTION, SOLUTION INTRAVENOUS; SUBCUTANEOUS at 14:19

## 2018-03-05 RX ADMIN — HEPARIN SODIUM 5000 UNITS: 5000 INJECTION, SOLUTION INTRAVENOUS; SUBCUTANEOUS at 05:53

## 2018-03-05 RX ADMIN — MELATONIN 3 MG: 3 TAB ORAL at 21:17

## 2018-03-05 RX ADMIN — HEPARIN SODIUM 5000 UNITS: 5000 INJECTION, SOLUTION INTRAVENOUS; SUBCUTANEOUS at 21:17

## 2018-03-05 RX ADMIN — FUROSEMIDE 40 MG: 10 INJECTION, SOLUTION INTRAMUSCULAR; INTRAVENOUS at 10:35

## 2018-03-05 RX ADMIN — BUDESONIDE AND FORMOTEROL FUMARATE DIHYDRATE 2 PUFF: 160; 4.5 AEROSOL RESPIRATORY (INHALATION) at 08:17

## 2018-03-05 RX ADMIN — TAMSULOSIN HYDROCHLORIDE 0.4 MG: 0.4 CAPSULE ORAL at 17:08

## 2018-03-05 RX ADMIN — METOPROLOL TARTRATE 25 MG: 25 TABLET ORAL at 17:07

## 2018-03-05 NOTE — PHYSICAL THERAPY NOTE
PT Treatment Note     03/05/18 1131   Pain Assessment   Pain Assessment No/denies pain   Cognition   Arousal/Participation Lethargic;Persistent stimuli required   Attention Difficulty attending to directions   Orientation Level Oriented to person   Following Commands Follows one step commands inconsistently   Subjective   Subjective Pt agreeable to therapy on this date  Bed Mobility   Supine to Sit 4  Minimal assistance   Additional items Assist x 1;Verbal cues;LE management   Sit to Supine 4  Minimal assistance   Additional items Verbal cues;LE management   Transfers   Sit to Stand Unable to assess  (Pt standing deferred d/t increased lethargy )   Ambulation/Elevation   Gait Assistance Not tested  (Ambulation deferred d/t increased lethargy )   Exercises   Hip Flexion Sitting;15 reps;AROM; Bilateral  (Consistent verbal cueing to remain on task )   Knee AROM Short Arc Quad Sitting;15 reps;AROM; Bilateral  (Consistent verbal cueing required to remain on task )   Assessment   Prognosis Fair   Assessment Pt previously amb Min Ax1 w/ RW, however pt unable to stand EOB due to increased lethargy and inability to follow one step directions  Ambulation deferred d/t increased confusion further increasing the risk of falls  Pt would continue to benefit from continued therapy services to further improve BLE strength and stability during transfers and increase safety to maximize return to PLOF  Plan   Treatment/Interventions Functional transfer training;LE strengthening/ROM; Therapeutic exercise   Progress Slow progress, medical status limitations   Pt positioned in bed for comfort w/ call bell in reach and bed alarm turned on at conclusion of therapy session

## 2018-03-05 NOTE — DISCHARGE INSTRUCTIONS
MRSA (Methicillin-Resistant Staphylococcus Aureus)   WHAT YOU NEED TO KNOW:   MRSA (methicillin-resistant Staphylococcus aureus) is a strain of staph bacteria that can cause infection  Staph bacteria are normal on your skin and in your nose  They do not usually cause infection  The bacteria can cause an infection if they get inside your body through a break in your skin  Usually, antibiotics are used to kill bacteria  MRSA bacteria are resistant to the common antibiotics used to treat Staph infections  MRSA infections are most common as skin infections  You can also have MRSA bacteria in your blood, lungs, heart, and bone  DISCHARGE INSTRUCTIONS:   Seek care immediately if:   · You develop new symptoms such as a cough or fever during or after treatment for MRSA infection  · Your symptoms get worse  Contact your healthcare provider if:   · Your symptoms return after treatment  · You have questions and concerns about your condition or care  Medicines: You may  need the following:  · Antibiotics  may help treat a bacterial infection  You may need to take antibiotics for weeks to months  Take all of your antibiotics until they are finished  · Take your medicine as directed  Contact your healthcare provider if you think your medicine is not helping or if you have side effects  Tell him or her if you are allergic to any medicine  Keep a list of the medicines, vitamins, and herbs you take  Include the amounts, and when and why you take them  Bring the list or the pill bottles to follow-up visits  Carry your medicine list with you in case of an emergency  Follow up with your healthcare provider within 2 days or as directed: You may be referred to an infectious disease specialist  Rachel Castro may need an exam or more tests to make sure your infection is healing  Write down your questions so you remember to ask them during your visits    Prevent the spread of MRSA:  Do the following if you have an active MRSA infection:  · Wash your hands often  Ask others in your home to wash their hands often  This is the most important thing you can do to prevent the spread of infection  Wash your hands several times each day, especially before and after you change your bandage  If someone else changes your bandage, they should wash their hands after  Carry germ-killing gel with you and use it to clean your hands when you have no soap and water  · Do not touch sores  Do not poke or squeeze sores  This can make the infection go deeper into your tissue  · Cover infected sores with a bandage  Make sure the sore is completely covered during activities that may cause skin to skin contact with another person  Examples include sports such as wrestling or football  Put an extra bandage on a sore that is draining fluid  This helps keep infected drainage off surfaces that others can touch  · Do not share personal items with others  This includes washcloths, towels, uniforms, clothes, and razors  · Do not use public pools, hot tubs, or therapy pools until your infection has healed  Your infected sore can spread the infection to another person through the water  · Tell all healthcare providers that you have a MRSA infection  If you are admitted to the hospital, you may be placed in a private room  Healthcare providers will wear gowns and gloves when they come into your room  They will also wash their hands often and clean your room well  Your visitors may also be asked to wear a gown and gloves  All of these practices help prevent the spread of MRSA to healthcare providers and other patients  MRSA and your home:  MRSA can stay on surfaces for weeks  It is important to keep others safe by doing the following:  · Clean surfaces daily with a disinfectant  Follow directions on the label for how to apply the disinfectant  Items that you use often should be cleaned daily   Examples include kitchen or bathroom counters, phones, doorknobs, and remote controls  Clean the shower or bathtub after each use  · Wash dishes and silverware in a  or in hot water  Do not share unwashed dishes or silverware with anyone  · Wash used sheets, towels, and clothes with water and laundry detergent  Put dirty laundry in the washer immediately  Put it in a plastic bag if you are not able to wash it immediately  You do no need to wash this laundry separately from other laundry  Use the warmest water possible for the type of clothing  Wash your hands after you touch dirty laundry and before you handle clean laundry  Dry laundry completely in a warm or hot dryer  © 2017 [employer] Information is for End User's use only and may not be sold, redistributed or otherwise used for commercial purposes  All illustrations and images included in CareNotes® are the copyrighted property of [employer]  or [employer]  The above information is an  only  It is not intended as medical advice for individual conditions or treatments  Talk to your doctor, nurse or pharmacist before following any medical regimen to see if it is safe and effective for you  MRSA (Methicillin-Resistant Staphylococcus Aureus)   WHAT YOU NEED TO KNOW:   What is MRSA? MRSA (methicillin-resistant Staphylococcus aureus) is a strain of staph bacteria that can cause infection  Usually, antibiotics are used to kill bacteria  MRSA bacteria are resistant to the common antibiotics used to treat Staph infections  This makes MRSA hard to treat  MRSA most commonly causes a skin or soft tissue infection  Bacteria may get into your skin or soft tissue through a cut, sore, or incision  MRSA may spread to your blood, lungs, heart, and bone  What increases my risk for MRSA?    · Touching the infected skin of someone who has MRSA    · Living in the same household as someone with MRSA    · History of MRSA infection     · Use of personal items such as towels, razors, or clothes of someone with MRSA    · Touching items such as doorknobs that have MRSA bacteria on it    · Being in crowded places where germs can be spread such as hospitals,  facilities, or locker rooms    · Taking antibiotics frequently, stopping antibiotics, or missing doses of antibiotics  What else do I need to know about MRSA infections? You can have an active MRSA infection or you can be a carrier of MRSA bacteria and not have symptoms  · Active MRSA infection on your skin makes you contagious  This means your infection can spread to another person  MRSA spreads if the person or pet touches something that comes in contact with your infection  For example, MRSA can be transferred on towels, wash cloths, and clothes  · As a carrier of MRSA bacteria, you can spread the bacteria through your skin and your nose  Bathe daily and wash your hands frequently  Cover your mouth and nose when you sneeze and cough  These actions help to keep from spreading the bacteria to others  What are the signs and symptoms of MRSA skin infection? · Skin sores     · Bumps on your skin that are red and painful    · A cut or incision that is red, swollen, and filled with pus    · Fluid-filled blisters    · Fever  How is MRSA diagnosed? Your healthcare provider will ask you about recent antibiotic use and other possible risks  Tell him or her when your symptoms started  Tell the provider if anyone close to you has had an infection  You may need any of the following:  · A wound culture  may show MRSA bacteria  Your healthcare provider will swab your wound  He will send the swab to the to be tested  He may need to make an incision and drain your wound or abscess to get a sample  · Blood and urine tests  may show if MRSA bacteria are inside of your body  How is MRSA treated? Some MRSA infections of the skin can be treated at home   Other MRSA infections need to be treated in the hospital  Treatment can help prevent the spread of MRSA infection to other parts of your body  Treatments may include:  · Antibiotics  may help treat the bacterial infection  Take all of your antibiotics until they are finished  · Incision and drainage  of a wound, a sore or an abscess may be needed  A provider will open and drain infected fluid and pus  This helps remove bacteria from your wound so it can heal   How can I prevent the spread of active MRSA infection? Do the following if you have an active MRSA infection:  · Wash your hands often  Ask others in your home to wash their hands often  This is the most important thing you can do to prevent the spread of infection  Wash your hands several times each day, especially before and after you change your bandage  If someone else changes your bandage, they should wash their hands after  Carry germ-killing gel with you and use it to clean your hands when you have no soap and water  · Do not touch sores  Do not poke or squeeze sores  This can make the infection go deeper into your tissue  · Cover infected sores with a bandage  Make sure the sore is completely covered during activities that may cause skin to skin contact with another person  Examples include sports such as wrestling or football  Put an extra bandage on a sore that is draining fluid  This helps keep infected drainage off surfaces that others can touch  · Do not share personal items with others  This includes washcloths, towels, uniforms, clothes, and razors  · Do not use public pools, hot tubs, or therapy pools until your infection has healed  Your infected sore can spread the infection to another person through the water  · Tell all healthcare providers that you have a MRSA infection  If you are admitted to the hospital, you may be placed in a private room  Healthcare providers will wear gowns and gloves when they come into your room   They will also wash their hands often and clean your room well  Your visitors may also be asked to wear a gown and gloves  All of these practices help prevent the spread of MRSA to healthcare providers and other patients  What do I need to know about MRSA in my home? MRSA can stay on surfaces for weeks  It is important to keep others safe by doing the following:  · Clean surfaces daily with a disinfectant  Follow directions on the label for how to apply the disinfectant  Items that you use often should be cleaned daily  Examples include kitchen or bathroom counters, phones, doorknobs, and remote controls  Clean the shower or bathtub after each use  · Wash dishes and silverware in a  or in hot water  Do not share unwashed dishes or silverware with anyone  · Wash used sheets, towels, and clothes with water and laundry detergent  Put dirty laundry in the washer immediately  Put it in a plastic bag if you are not able to wash it immediately  You do no need to wash this laundry separately from other laundry  Use the warmest water possible for the type of clothing  Wash your hands after you touch dirty laundry and before you handle clean laundry  Dry laundry completely in a warm or hot dryer  When should I seek immediate care? · You develop new symptoms such as a cough or fever during or after treatment for MRSA infection  When should I contact my healthcare provider? · Your symptoms do not get better within 2 days of treatment, or they get worse  · Your symptoms return after treatment  · You have questions and concerns about your condition or care  CARE AGREEMENT:   You have the right to help plan your care  Learn about your health condition and how it may be treated  Discuss treatment options with your caregivers to decide what care you want to receive  You always have the right to refuse treatment  The above information is an  only   It is not intended as medical advice for individual conditions or treatments  Talk to your doctor, nurse or pharmacist before following any medical regimen to see if it is safe and effective for you  © 2017 [employer] Information is for End User's use only and may not be sold, redistributed or otherwise used for commercial purposes  All illustrations and images included in CareNotes® are the copyrighted property of [employer]  or [employer]

## 2018-03-05 NOTE — PROGRESS NOTES
Progress Note - Nephrology   Steve Andrews 80 y o  male MRN: [id number]  Unit/Bed#: 411-01 Encounter: [id number]    A/P:  1  RADAMES on top of CKD               Patient's cause of RADAMES a little difficult to ascertain at this time  He has mild hydronephrosis and we cannot blame the entirety of RADAMES on this finding, there may be a small component of reduced renal function due to this anatomic issue  His history would suggest a volume depleted state with n-v-d, however, on objective physical exam, radiologic and by BNP he appears slightly volume overloaded at this time  I would continue to address possible  infection (cystitis), ceftriaxone a safer and more appropriate medication  Continue with supportive care, I will check urine studies at this time for completeness sake of work up  3/1: Renal function is a little worse this AM, I will give 1L of NS today with gentle infusion rate of 75 ml/hr  Patient's FeNa is <1%, given severe cardiac valvulopathy, he will likely benefit from a little more volume expansion given overall clinical findings and probable positive hemodynamic response  3/2: Patient with minimal response to IVF volume expansion  He could tolerate more volume expansion and I will offer another 1L of NS today  Continue to closely monitor the patient's volume status as he appears to have a small ability to tolerate too much volume expansion as it will lead into CHF               3/3: Receiving IVF for probable volume depletion  Would hold IVF if he is drinking due to history of CHF  Check labs in the morning              3/4: Renal function about the same - follow daily labs              3/5: little change clinically  Receiving diuretic and has mild edema  2  CKD III with baseline Cr ~1 4 mg/dL  3  Hypernatremia -resolved  4  HTN + CKD + CHF -patient appears compensated at this time  5  Cystitis               Follow up cultures, continue with ceftriaxone  Culture remains pending    6  Nephrolithiasis with mild right sided hydroureteronephrosis              No intervention from Urology at this time, we will continue to treat medically  7  Severe pulmonary HTN  8  Mod to severe mitral regurgitation  9  Hyperkalemia" recheck and place potassium restriction in diet      Follow up reason for today's visit: RADAMES/CKD    Ureterolithiasis    Patient Active Problem List   Diagnosis    Kidney stone    CKD (chronic kidney disease), stage III    COPD (chronic obstructive pulmonary disease) (HCC)    Hyperlipidemia    Essential hypertension    Type 2 diabetes mellitus (HCC)    Chronic systolic congestive heart failure (HCC)    Elevated troponin I level    Acute kidney injury (HCC)    Ureterolithiasis    Thrombocytopenia (HCC)    Leukopenia    Non-ST elevation myocardial infarction (NSTEMI), type 2 (HCC)    Severe aortic stenosis    MRSA colonization    Elevated TSH    Acute cystitis    Pulmonary hypertension    Mitral regurgitation    Aortic regurgitation    Hypothyroidism         Subjective:     Patient without acute events overnight  Objective:     Vitals: Blood pressure 124/76, pulse 60, temperature 97 7 °F (36 5 °C), temperature source Temporal, resp  rate 18, height 5' 5" (1 651 m), weight 80 6 kg (177 lb 11 1 oz), SpO2 98 %  ,Body mass index is 29 57 kg/m²  Weight (last 2 days)     Date/Time   Weight    03/05/18 0600  80 6 (177 69)    03/04/18 0600  79 2 (174 6)    03/03/18 0600  79 3 (174 83)                Intake/Output Summary (Last 24 hours) at 03/05/18 1001  Last data filed at 03/05/18 0836   Gross per 24 hour   Intake          1513 33 ml   Output                0 ml   Net          1513 33 ml     I/O last 3 completed shifts: In: 1493 3 [P O :760;  I V :733 3]  Out: 175 [Urine:175]         Physical Exam: /76 (BP Location: Left arm)   Pulse 60   Temp 97 7 °F (36 5 °C) (Temporal)   Resp 18   Ht 5' 5" (1 651 m)   Wt 80 6 kg (177 lb 11 1 oz)   SpO2 98%   BMI 29 57 kg/m²     General Appearance:    Alert, cooperative, no distress, appears stated age   Head:    Normocephalic, without obvious abnormality, atraumatic   Eyes:    Conjunctiva/corneas clear   Ears:    Normal external ears   Nose:   Nares normal, septum midline, mucosa normal, no drainage    or sinus tenderness   Throat:   Lips, mucosa, and tongue normal; teeth and gums normal   Neck:   Supple   Back:     Symmetric, no curvature, ROM normal, no CVA tenderness   Lungs:     Exp rhonchi bilaterally, respirations unlabored   Chest wall:    No tenderness or deformity   Heart:    Regular rate and rhythm, S1 and S2 normal, no murmur, rub   or gallop   Abdomen:     Soft, non-tender, bowel sounds active   Extremities:   Extremities normal, atraumatic, no cyanosis, +2 edema bilaterally 2+ edema   Skin:   Skin color, texture, turgor normal, no rashes or lesions   Lymph nodes:   Cervical normal   Neurologic:   CNII-XII intact            Lab, Imaging and other studies: I have personally reviewed pertinent labs  CBC:   Lab Results   Component Value Date    WBC 4 86 03/05/2018    HGB 9 8 (L) 03/05/2018    HCT 33 1 (L) 03/05/2018    MCV 85 03/05/2018     (L) 03/05/2018    MCH 25 3 (L) 03/05/2018    MCHC 29 6 (L) 03/05/2018    RDW 18 9 (H) 03/05/2018    MPV 10 8 03/05/2018     CMP:   Lab Results   Component Value Date     03/05/2018    K 5 3 03/05/2018     03/05/2018    CO2 24 03/05/2018    ANIONGAP 9 03/05/2018    BUN 53 (H) 03/05/2018    CREATININE 1 84 (H) 03/05/2018    GLUCOSE 108 03/05/2018    CALCIUM 8 2 (L) 03/05/2018    AST 22 03/05/2018    ALT 21 03/05/2018    ALKPHOS 102 03/05/2018    PROT 6 5 03/05/2018    BILITOT 0 40 03/05/2018    EGFR 32 03/05/2018             Results from last 7 days  Lab Units 03/05/18  0436 03/04/18  1225 03/04/18  0606 03/03/18  0416   SODIUM mmol/L 138 138 138 137   POTASSIUM mmol/L 5 3 5 4* 5 8* 5 2   CHLORIDE mmol/L 105 105 104 105   CO2 mmol/L 24 27 25 24   BUN mg/dL 53* 53* 52* 50*   CREATININE mg/dL 1 84* 1 85* 1 83* 1 74*   CALCIUM mg/dL 8 2* 8 4 8 3 7 8*   TOTAL PROTEIN g/dL 6 5  --  6 7 6 5   BILIRUBIN TOTAL mg/dL 0 40  --  0 50 0 40   ALK PHOS U/L 102  --  101 91   ALT U/L 21  --  21 20   AST U/L 22  --  22 26   GLUCOSE RANDOM mg/dL 108 122 115 118         Phosphorus:   No results found for: PHOS  Magnesium:   No results found for: MG  Urinalysis: No results found for: COLORU, CLARITYU, SPECGRAV, PHUR, LEUKOCYTESUR, NITRITE, PROTEINUA, GLUCOSEU, KETONESU, BILIRUBINUR, BLOODU  Ionized Calcium: No results found for: CAION  Coagulation:   No results found for: PT, INR, APTT  Troponin:   No results found for: TROPONINI  ABG: No results found for: PHART, QID8BNP, PO2ART, LUD1OVF, C7LNQDTB, BEART, SOURCE  Radiology review:     IMAGING  Procedure: Ct Abdomen Pelvis Wo Contrast    Result Date: 2/27/2018  Narrative: CT ABDOMEN AND PELVIS WITHOUT IV CONTRAST INDICATION: diarhea abdm distention  History taken directly from the electronic ordering system  COMPARISON: None  TECHNIQUE:  CT examination of the abdomen and pelvis was performed without intravenous contrast   Axial, sagittal, and coronal 2D reformatted images were created from the source data and submitted for interpretation  Radiation dose length product (DLP) for this visit:  721 9 mGy-cm   This examination, like all CT scans performed in the [facility], was performed utilizing techniques to minimize radiation dose exposure, including the use of iterative reconstruction and automated exposure control  Enteric contrast was not administered  FINDINGS: ABDOMEN LOWER CHEST:  Bilateral pleural effusions, right greater than left  Atelectatic versus consolidative changes at the left base  LIVER/BILIARY TREE:  Within limitations of noncontrast, no focal lesion  GALLBLADDER:  No calcified gallstones  No pericholecystic inflammatory change  SPLEEN:  Unremarkable  PANCREAS:  Much of the pancreas is atrophic    ADRENAL GLANDS: Unremarkable  KIDNEYS/URETERS:  Simple appearing left renal cysts  6 mm catheter in the distal left ureter just proximal to the UVJ  Mild left hydroureter without substantial hydronephrosis  Punctate calculus in the superior pole of the left kidney  STOMACH AND BOWEL:  No evidence for bowel obstruction  Diverticulosis  No clear evidence for diverticulitis or colitis  APPENDIX:  No findings to suggest appendicitis  ABDOMINOPELVIC CAVITY:  Mild abdominal ascites  Scattered lymph nodes in the retroperitoneum which are nonspecific  VESSELS:  Atherosclerotic changes are present  No evidence of aneurysm  PELVIS REPRODUCTIVE ORGANS:  Prostatomegaly URINARY BLADDER:  Bladder is slightly thick-walled  Correlate with UA for cystitis in the possibility of chronic bladder outlet obstruction  ABDOMINAL WALL/INGUINAL REGIONS:  Anasarca  OSSEOUS STRUCTURES:  No acute fracture or destructive osseous lesion  Impression: 6 mm calculus in the distal left ureter just proximal to the UVJ resulting in mild left-sided hydroureter but no significant hydronephrosis  Bilateral pleural effusions, right greater than left with atelectatic versus consolidative change at the left base  Thick-walled bladder  Could reflect cystitis versus sequela chronic outlet obstruction  Mild abdominal ascites  Workstation performed: [id number]     Procedure: Xr Chest 1 View Portable    Result Date: 2/27/2018  Narrative: CHEST INDICATION:  Shortness of breath  diarrhea COMPARISON:  October 4, 2017  EXAM PERFORMED/VIEWS:  XR CHEST PORTABLE FINDINGS: Heart enlarged  ICD present  Pulmonary vessels prominent and indistinct  Opacification in the lower left hemithorax, probably a combination of pleural effusion and atelectasis or consolidation in the left lower lobe  Right lung clear  No pneumothorax  Osseous structures appear within normal limits for patient age  Impression: 1  Cardiomegaly and pulmonary vascular congestion   2   Probable small left pleural effusion and atelectasis or consolidation in the left lower lobe  Workstation performed: [id number]     Procedure: Us Kidney And Bladder    Result Date: 2/28/2018  Narrative: RENAL ULTRASOUND INDICATION: RENAL FAILURE, ACUTE RENAL FAILURE, CHRONIC arf, crf COMPARISON: CT 2/27/2018 TECHNIQUE:   Ultrasound of the retroperitoneum was performed with a curvilinear transducer utilizing volumetric sweeps and still imaging techniques  FINDINGS: KIDNEYS: Symmetric and normal size  Right kidney:  9 6 cm  Left kidney:  9 8 cm  Right kidney The renal parenchyma is diffusely echogenic consistent with medical renal disease  No suspicious masses detected  No hydronephrosis  No shadowing calculi  No perinephric fluid collections  Left kidney The renal parenchyma is diffusely echogenic consistent with medical renal disease  No suspicious masses detected  Left renal simple cysts  No hydronephrosis  No shadowing calculi  No perinephric fluid collections  URETERS: Nonvisualized  BLADDER: Diffuse bladder wall thickening could relate to cystitis or outlet obstruction changes, correlate clinically  4 7 x 2 9 x 3 6 cm prostate protrudes into the base of the bladder  Small amount of ascites noted in the left upper abdominal quadrant as on CT  Impression: 1  Bilateral medical renal disease  2   No hydronephrosis  3   Left renal cysts  4   Diffuse bladder wall thickening could relate to cystitis or outlet obstruction changes, correlate clinically  4 7 x 2 9 x 3 6 cm prostate protrudes into the base of the bladder   Workstation performed: [id number]       Current Facility-Administered Medications   Medication Dose Route Frequency    acetaminophen (TYLENOL) tablet 650 mg  650 mg Oral Q6H PRN    aspirin chewable tablet 81 mg  81 mg Oral Daily    atorvastatin (LIPITOR) tablet 40 mg  40 mg Oral Daily With Dinner    b complex-vitamin C-folic acid (NEPHROCAPS) capsule 1 capsule  1 capsule Oral Daily With Ventive budesonide-formoterol (SYMBICORT) 160-4 5 mcg/act inhaler 2 puff  2 puff Inhalation BID    cefTRIAXone (ROCEPHIN) IVPB (premix) 1,000 mg  1,000 mg Intravenous Q24H    fluticasone (FLONASE) 50 mcg/act nasal spray 1 spray  1 spray Nasal Daily    furosemide (LASIX) injection 40 mg  40 mg Intravenous Once    heparin (porcine) subcutaneous injection 5,000 Units  5,000 Units Subcutaneous Q8H [address]    insulin lispro (HumaLOG) 100 units/mL subcutaneous injection 1-6 Units  1-6 Units Subcutaneous TID AC    insulin lispro (HumaLOG) 100 units/mL subcutaneous injection 1-6 Units  1-6 Units Subcutaneous HS    levalbuterol (XOPENEX) inhalation solution 1 25 mg  1 25 mg Nebulization Q6H PRN    levothyroxine tablet 150 mcg  150 mcg Oral Early Morning    melatonin tablet 3 mg  3 mg Oral HS    metoprolol tartrate (LOPRESSOR) tablet 25 mg  25 mg Oral BID    mupirocin (BACTROBAN) 2 % nasal ointment 1 application  1 application Nasal [id number] [address]    ondansetron (ZOFRAN) injection 4 mg  4 mg Intravenous Q6H PRN    sodium chloride 0 9 % inhalation solution 3 mL  3 mL Nebulization Q6H PRN    tamsulosin (FLOMAX) capsule 0 4 mg  0 4 mg Oral Daily With Dinner    traZODone (DESYREL) tablet 50 mg  50 mg Oral HS     Medications Discontinued During This Encounter   Medication Reason    aspirin 81 MG tablet     metoprolol tartrate (LOPRESSOR) 50 mg tablet Dose adjustment    simvastatin (ZOCOR) 40 mg tablet Dose adjustment    albuterol (PROVENTIL HFA,VENTOLIN HFA) 90 mcg/act inhaler     allopurinol (ZYLOPRIM) 100 mg tablet     Calcium Carbonate (CALCIUM 600 HIGH POTENCY PO)     DULoxetine (CYMBALTA) 30 mg delayed release capsule     fluticasone-salmeterol (ADVAIR) 250-50 mcg/dose inhaler     furosemide (LASIX) 40 mg tablet     glipiZIDE (GLUCOTROL) 5 mg tablet     indomethacin (INDOCIN) 50 mg capsule     IRON PO     Linagliptin (TRADJENTA) 5 MG TABS     losartan (COZAAR) 50 mg tablet     metolazone (ZAROXOLYN) 5 mg tablet  Misc Natural Products (TART CHERRY ADVANCED PO)     Nutritional Supplements (VITAMIN D BOOSTER PO)     oxyCODONE-acetaminophen (PERCOCET) 5-325 mg per tablet     potassium chloride (MICRO-K) 10 MEQ CR capsule     POTASSIUM GLUCONATE PO     vitamin E, tocopherol, 400 units capsule     sodium chloride 0 9 % infusion     bumetanide (BUMEX) tablet 1 mg     finasteride (PROSCAR) tablet 5 mg     hydrOXYzine HCL (ATARAX) tablet 25 mg     zolpidem (AMBIEN) tablet 5 mg     fluticasone (FLONASE) 50 mcg/act nasal spray 2 spray     acetaminophen (TYLENOL) tablet 650 mg     aspirin tablet 325 mg     pantoprazole (PROTONIX) EC tablet 20 mg     pravastatin (PRAVACHOL) tablet 40 mg     cefTRIAXone (ROCEPHIN) IVPB (premix) 1,000 mg     levothyroxine tablet 125 mcg     sodium chloride 0 9 % infusion        Te Ramos MD

## 2018-03-05 NOTE — SOCIAL WORK
Spoke with [facility] (Ferdinand Goodpasture) [phone] this am who stated 02 and hospital bed were ordered for pt and will be delivered today

## 2018-03-05 NOTE — PROGRESS NOTES
[facility] Progress Note  Patient: Cedric Riding 80 y o  male   MRN: [id number]  PCP: Army Don MD  Unit/Bed#: 541-77 Encounter: [id number]  Date Of Visit: 03/05/18    Assessment:    Principal Problem:    Ureterolithiasis  Active Problems:    Kidney stone    CKD (chronic kidney disease), stage III    COPD (chronic obstructive pulmonary disease) ([facility] )    Hyperlipidemia    Essential hypertension    Type 2 diabetes mellitus (HCC)    Chronic systolic congestive heart failure (HCC)    Elevated troponin I level    Acute kidney injury ([facility] )    Thrombocytopenia (HCC)    Leukopenia    Non-ST elevation myocardial infarction (NSTEMI), type 2 (HCC)    Severe aortic stenosis    MRSA colonization    Elevated TSH    Acute cystitis    Pulmonary hypertension    Mitral regurgitation    Aortic regurgitation    Hypothyroidism      Plan:  1  Ureterolithiasis/Acute kidney injury(POA)/Chronic kidney disease stage 3: the patient's renal function is stable  IV fluids stopped due to hypoxia overnight    Repeat labs in am  Continue Flomax       2  Acute cystitis secondary to proteus mirabilis: Continue IV Ceftriaxone  Today is day 7       3  Hypothyroidism/Elevated TSH: continue with increased dose of Levothyroxine of 150 mcg PO Qdaily in the early morning  Recheck a TSH level in 4-6 weeks      4  Acute on chronic systolic CHF/Reduced right ventricular function/Moderate-severe mitral regurgitation/Severe aortic stenosis/Moderate aortic regurgitation/Moderate tricuspid regurgitation/Pericardial effusion/Severe pulmonary hypertension: the patient was noted to be hypoxic overnight and oxygen was increased from 2 L to 4 L  He is currently on 3L  Chest x-ray was positive for CHF  The patient was given a 1 time dose of IV lasix 40 mg today  Continuous pulse oximetry  Continue to monitor closely and attempted to wean down oxygen      5  MRSA colonization: Nasal Bactroban BID  Day 2/5  He will need MRSA de-colonization  VTE Pharmacologic Prophylaxis:   Pharmacologic: Heparin  Mechanical VTE Prophylaxis in Place: Yes      Discussions with Specialists or Other Care Team Provider: Nursing, CM, and attending    Education and Discussions with Family / Patient: n/a    Time Spent for Care: 30 minutes  More than 50% of total time spent on counseling and coordination of care as described above  Current Length of Stay: 6 day(s)    Current Patient Status: Inpatient       Code Status: Level 1 - Full Code      Subjective: The patient was seen and examined  The patient denies any complaints  He was noted to be hypoxic overnight needing his oxygen increased from 2 L to 4L  Objective:     Vitals:   Temp (24hrs), Av 3 °F (36 3 °C), Min:96 7 °F (35 9 °C), Max:97 7 °F (36 5 °C)    HR:  [60-65] 60  Resp:  [18-20] 18  BP: (115-125)/(52-78) 124/76  SpO2:  [95 %-100 %] 98 %  Body mass index is 29 57 kg/m²  Input and Output Summary (last 24 hours): Intake/Output Summary (Last 24 hours) at 18 1509  Last data filed at 18 1424   Gross per 24 hour   Intake          1453 33 ml   Output              235 ml   Net          1218 33 ml       Physical Exam:     Physical Exam   Constitutional: Vital signs are normal  He appears well-developed and well-nourished  He is cooperative  Cardiovascular: Normal rate and regular rhythm  Murmur heard  Pulmonary/Chest: Effort normal  He has no wheezes  He has no rhonchi  He has rales in the right lower field and the left lower field  Abdominal: Soft  Normal appearance and bowel sounds are normal  There is no tenderness  Neurological: He is alert  No cranial nerve deficit  Patient is alert  He is confused at times and will not always answer questions  Skin: Skin is warm, dry and intact  Nursing note and vitals reviewed          Additional Data:     Labs:      Results from last 7 days  Lab Units 18  0436  18  0454   WBC Thousand/uL 4 86  < > 7 29   HEMOGLOBIN g/dL 9 8*  < > 9 4*   HEMATOCRIT % 33 1*  < > 32 3*   PLATELETS Thousands/uL 116*  < > 114*   NEUTROS PCT %  --   --  66   LYMPHS PCT %  --   --  6*   LYMPHO PCT % 13*  < >  --    MONOS PCT %  --   --  26*   MONO PCT MAN % 8  < >  --    EOS PCT %  --   --  2   EOSINO PCT MANUAL % 2  < >  --    < > = values in this interval not displayed  Results from last 7 days  Lab Units 03/05/18  0436   SODIUM mmol/L 138   POTASSIUM mmol/L 5 3   CHLORIDE mmol/L 105   CO2 mmol/L 24   BUN mg/dL 53*   CREATININE mg/dL 1 84*   CALCIUM mg/dL 8 2*   TOTAL PROTEIN g/dL 6 5   BILIRUBIN TOTAL mg/dL 0 40   ALK PHOS U/L 102   ALT U/L 21   AST U/L 22   GLUCOSE RANDOM mg/dL 108       Results from last 7 days  Lab Units 03/03/18  0416   INR  1 29*       * I Have Reviewed All Lab Data Listed Above  * Additional Pertinent Lab Tests Reviewed: All Labs Within Last 24 Hours Reviewed    Imaging:    Imaging Reports Reviewed Today Include: chest x-ray  Imaging Personally Reviewed by Myself Includes:  none    Recent Cultures (last 7 days):       Results from last 7 days  Lab Units 02/27/18 2001 02/27/18  1631   BLOOD CULTURE  No Growth After 5 Days  No Growth After 5 Days  --    URINE CULTURE   --  Culture results to follow    30,000-39,000 cfu/ml Proteus mirabilis*       Last 24 Hours Medication List:     Current Facility-Administered Medications:  acetaminophen 650 mg Oral Q6H PRN Liberty Diss, DO    aspirin 81 mg Oral Daily Liberty Diss, DO    atorvastatin 40 mg Oral Daily With Clarence Verdin DO    b complex-vitamin C-folic acid 1 capsule Oral Daily With Dinner Sherman Vogel PA-C    budesonide-formoterol 2 puff Inhalation BID Sherman Vogel PA-C    cefTRIAXone 1,000 mg Intravenous Q24H Liberty Diss, DO Last Rate: 1,000 mg (03/04/18 1706)   fluticasone 1 spray Nasal Daily Jerry Ochoa DO    heparin (porcine) 5,000 Units Subcutaneous Q8H [facility] Sherman Vogel PA-C    insulin lispro 1-6 Units Subcutaneous TID  Sherman Vogel PA-C insulin lispro 1-6 Units Subcutaneous HS Sherman Vogel PA-C    levalbuterol 1 25 mg Nebulization Q6H PRN Eloina Saleh, DO    levothyroxine 150 mcg Oral Early Morning Eloina Delfino, DO    melatonin 3 mg Oral HS Eloina Saleh, DO    metoprolol tartrate 25 mg Oral BID Eloina Saleh, DO    mupirocin 1 application Nasal [id number] [facility] Jerry Ochoa, DO    ondansetron 4 mg Intravenous Q6H PRN Sherman Vogel PA-C    sodium chloride 3 mL Nebulization Q6H PRN Eloina Saleh, DO    tamsulosin 0 4 mg Oral Daily With [employer], LETA    traZODone 50 mg Oral HS Sherman Vogel PA-C         Today, Patient Was Seen By: Sae Bailey PA-C    ** Please Note: This note has been constructed using a voice recognition system   **

## 2018-03-05 NOTE — PROGRESS NOTES
Pt having difficulty breathing and coughing  SpO2 -88% on 2L O2, BP- 115/52, HR- 60  Turned patients O2 to 4L- SpO2 97%  Pt lung sounds- rales and rhonchi, coughing up pink-tinged sputum, may be due to recent nose bleed  Pt also has generalized edema and JVD  Placed fluids on hold, Wilner MICHELLE notified and saw pt at bedside  Pt given a breathing treatment  New orders to hold IV fluids 1-2 hours for now and reassess   Will continue to monitor

## 2018-03-06 LAB
ANION GAP SERPL CALCULATED.3IONS-SCNC: 6 MMOL/L (ref 4–13)
BASOPHILS # BLD AUTO: 0.02 THOUSANDS/ΜL (ref 0–0.1)
BASOPHILS NFR BLD AUTO: 0 % (ref 0–1)
BILIRUB UR QL STRIP: NEGATIVE
BUN SERPL-MCNC: 55 MG/DL (ref 5–25)
CALCIUM SERPL-MCNC: 8.6 MG/DL (ref 8.3–10.1)
CHLORIDE SERPL-SCNC: 104 MMOL/L (ref 100–108)
CLARITY UR: CLEAR
CO2 SERPL-SCNC: 28 MMOL/L (ref 21–32)
COLOR UR: YELLOW
CREAT SERPL-MCNC: 1.87 MG/DL (ref 0.6–1.3)
EOSINOPHIL # BLD AUTO: 0.06 THOUSAND/ΜL (ref 0–0.61)
EOSINOPHIL NFR BLD AUTO: 1 % (ref 0–6)
ERYTHROCYTE [DISTWIDTH] IN BLOOD BY AUTOMATED COUNT: 18.9 % (ref 11.6–15.1)
GFR SERPL CREATININE-BSD FRML MDRD: 31 ML/MIN/1.73SQ M
GLUCOSE SERPL-MCNC: 118 MG/DL (ref 65–140)
GLUCOSE SERPL-MCNC: 126 MG/DL (ref 65–140)
GLUCOSE SERPL-MCNC: 131 MG/DL (ref 65–140)
GLUCOSE SERPL-MCNC: 183 MG/DL (ref 65–140)
GLUCOSE SERPL-MCNC: 96 MG/DL (ref 65–140)
GLUCOSE UR STRIP-MCNC: NEGATIVE MG/DL
HCT VFR BLD AUTO: 35.5 % (ref 36.5–49.3)
HGB BLD-MCNC: 10.6 G/DL (ref 12–17)
HGB UR QL STRIP.AUTO: NEGATIVE
KETONES UR STRIP-MCNC: NEGATIVE MG/DL
LEUKOCYTE ESTERASE UR QL STRIP: NEGATIVE
LYMPHOCYTES # BLD AUTO: 0.59 THOUSANDS/ΜL (ref 0.6–4.47)
LYMPHOCYTES NFR BLD AUTO: 12 % (ref 14–44)
MCH RBC QN AUTO: 25.4 PG (ref 26.8–34.3)
MCHC RBC AUTO-ENTMCNC: 29.9 G/DL (ref 31.4–37.4)
MCV RBC AUTO: 85 FL (ref 82–98)
MONOCYTES # BLD AUTO: 1.47 THOUSAND/ΜL (ref 0.17–1.22)
MONOCYTES NFR BLD AUTO: 31 % (ref 4–12)
NEUTROPHILS # BLD AUTO: 2.62 THOUSANDS/ΜL (ref 1.85–7.62)
NEUTS SEG NFR BLD AUTO: 56 % (ref 43–75)
NITRITE UR QL STRIP: NEGATIVE
PH UR STRIP.AUTO: 5.5 [PH] (ref 4.5–8)
PLATELET # BLD AUTO: 125 THOUSANDS/UL (ref 149–390)
PMV BLD AUTO: 10.4 FL (ref 8.9–12.7)
POTASSIUM SERPL-SCNC: 5.1 MMOL/L (ref 3.5–5.3)
PROT UR STRIP-MCNC: NEGATIVE MG/DL
RBC # BLD AUTO: 4.17 MILLION/UL (ref 3.88–5.62)
SODIUM SERPL-SCNC: 138 MMOL/L (ref 136–145)
SP GR UR STRIP.AUTO: 1.01 (ref 1–1.03)
UROBILINOGEN UR QL STRIP.AUTO: 0.2 E.U./DL
WBC # BLD AUTO: 4.76 THOUSAND/UL (ref 4.31–10.16)

## 2018-03-06 PROCEDURE — 97116 GAIT TRAINING THERAPY: CPT

## 2018-03-06 PROCEDURE — 81003 URINALYSIS AUTO W/O SCOPE: CPT | Performed by: PHYSICIAN ASSISTANT

## 2018-03-06 PROCEDURE — 97530 THERAPEUTIC ACTIVITIES: CPT

## 2018-03-06 PROCEDURE — 99232 SBSQ HOSP IP/OBS MODERATE 35: CPT | Performed by: PHYSICIAN ASSISTANT

## 2018-03-06 PROCEDURE — 97110 THERAPEUTIC EXERCISES: CPT

## 2018-03-06 PROCEDURE — 85025 COMPLETE CBC W/AUTO DIFF WBC: CPT | Performed by: PHYSICIAN ASSISTANT

## 2018-03-06 PROCEDURE — 94762 N-INVAS EAR/PLS OXIMTRY CONT: CPT

## 2018-03-06 PROCEDURE — 80048 BASIC METABOLIC PNL TOTAL CA: CPT | Performed by: PHYSICIAN ASSISTANT

## 2018-03-06 PROCEDURE — 82948 REAGENT STRIP/BLOOD GLUCOSE: CPT

## 2018-03-06 PROCEDURE — 94760 N-INVAS EAR/PLS OXIMETRY 1: CPT

## 2018-03-06 RX ORDER — FUROSEMIDE 10 MG/ML
40 INJECTION INTRAMUSCULAR; INTRAVENOUS DAILY
Status: DISCONTINUED | OUTPATIENT
Start: 2018-03-07 | End: 2018-03-07

## 2018-03-06 RX ORDER — FUROSEMIDE 10 MG/ML
40 INJECTION INTRAMUSCULAR; INTRAVENOUS ONCE
Status: COMPLETED | OUTPATIENT
Start: 2018-03-06 | End: 2018-03-06

## 2018-03-06 RX ADMIN — MELATONIN 3 MG: 3 TAB ORAL at 21:09

## 2018-03-06 RX ADMIN — BUDESONIDE AND FORMOTEROL FUMARATE DIHYDRATE 2 PUFF: 160; 4.5 AEROSOL RESPIRATORY (INHALATION) at 21:11

## 2018-03-06 RX ADMIN — Medication 1 CAPSULE: at 16:47

## 2018-03-06 RX ADMIN — TAMSULOSIN HYDROCHLORIDE 0.4 MG: 0.4 CAPSULE ORAL at 16:47

## 2018-03-06 RX ADMIN — LEVOTHYROXINE SODIUM 150 MCG: 150 TABLET ORAL at 05:59

## 2018-03-06 RX ADMIN — ACETAMINOPHEN 650 MG: 325 TABLET, FILM COATED ORAL at 10:41

## 2018-03-06 RX ADMIN — ATORVASTATIN CALCIUM 40 MG: 40 TABLET, FILM COATED ORAL at 16:47

## 2018-03-06 RX ADMIN — BUDESONIDE AND FORMOTEROL FUMARATE DIHYDRATE 2 PUFF: 160; 4.5 AEROSOL RESPIRATORY (INHALATION) at 08:12

## 2018-03-06 RX ADMIN — FLUTICASONE PROPIONATE 1 SPRAY: 50 SPRAY, METERED NASAL at 08:12

## 2018-03-06 RX ADMIN — MUPIROCIN 1 APPLICATION: 20 OINTMENT TOPICAL at 08:12

## 2018-03-06 RX ADMIN — FUROSEMIDE 40 MG: 10 INJECTION, SOLUTION INTRAMUSCULAR; INTRAVENOUS at 11:49

## 2018-03-06 RX ADMIN — ASPIRIN 81 MG 81 MG: 81 TABLET ORAL at 08:11

## 2018-03-06 RX ADMIN — METOPROLOL TARTRATE 25 MG: 25 TABLET ORAL at 08:11

## 2018-03-06 RX ADMIN — METOPROLOL TARTRATE 25 MG: 25 TABLET ORAL at 17:00

## 2018-03-06 RX ADMIN — HEPARIN SODIUM 5000 UNITS: 5000 INJECTION, SOLUTION INTRAVENOUS; SUBCUTANEOUS at 05:59

## 2018-03-06 RX ADMIN — TRAZODONE HYDROCHLORIDE 50 MG: 50 TABLET ORAL at 21:09

## 2018-03-06 RX ADMIN — MUPIROCIN 1 APPLICATION: 20 OINTMENT TOPICAL at 21:14

## 2018-03-06 RX ADMIN — INSULIN LISPRO 1 UNITS: 100 INJECTION, SOLUTION INTRAVENOUS; SUBCUTANEOUS at 11:46

## 2018-03-06 RX ADMIN — HEPARIN SODIUM 5000 UNITS: 5000 INJECTION, SOLUTION INTRAVENOUS; SUBCUTANEOUS at 14:14

## 2018-03-06 RX ADMIN — HEPARIN SODIUM 5000 UNITS: 5000 INJECTION, SOLUTION INTRAVENOUS; SUBCUTANEOUS at 21:09

## 2018-03-06 NOTE — SOCIAL WORK
Continuing to follow pt's medical status  Plans at this time are to continue to treat pt medically before having pt dc'd home with home hospice(daughter Rhett Robin is agreeable)  Cm also looking into seeing if [facility]issa [facility] will re consider approving pt to go to a SNF on dc as pt has had a decline with therapy and is now requiring 02 3 5l and wasn't on oxygen prior to coming in  Sent clinical over to insurance, awaiting a call back

## 2018-03-06 NOTE — OCCUPATIONAL THERAPY NOTE
OT Note     03/06/18 1242   Restrictions/Precautions   Other Precautions Fall Risk;O2;Bed Alarm; Chair Alarm;Contact/isolation   Therapeutic Exercise - ROM   UE-ROM Yes   ROM- Right Upper Extremities   R Shoulder AAROM; Flexion; Extension   R Elbow AAROM;Elbow flexion;Elbow extension   R Wrist AAROM;Wrist flexion;Wrist extension   R Hand AAROM  (forearm pro/supination)   R Weight/Reps/Sets All movements completed 2 sets/10   ROM - Left Upper Extremities    L Shoulder AAROM; Flexion; Extension   L Elbow AAROM;Elbow flexion;Elbow extension   L Wrist AAROM;Wrist flexion;Wrist extension   L Hand AAROM  (forearm pro/supination)   L Weight/Reps/Sets All movements completed 2 sets/10   Activity Tolerance   Activity Tolerance (Tolerates tx session)   Assessment   Assessment Presents supine, eyes closed, responds when name is called  Does not open eyes despite cues  AAROM completed BUE as per above, does not follow cues for active ROM   Remains in supine following tx session  Call bell in reach

## 2018-03-06 NOTE — SOCIAL WORK
Received a call from Neeraj Ndiaye at [facility]) they will continue to follow pt and due to pt's decline likely will approve pt to go to SNF on dc once he is medically cleared  They will not proceed with authorization at this time as pt is not medically read  Per Neeraj Ndiaye, CM needs to send updated information in the next day or two and they will review  Neeraj Ndiaye can be reached at [phone] x  and fax is [phone]

## 2018-03-06 NOTE — PROGRESS NOTES
Progress Note - Nephrology   Geno Arceo 80 y o  male MRN: [id number]  Unit/Bed#: 411-01 Encounter: [id number]    A/P:  1  RADAMES on top of CKD               Patient's cause of RADAMES a little difficult to ascertain at this time  He has mild hydronephrosis and we cannot blame the entirety of RADAMES on this finding, there may be a small component of reduced renal function due to this anatomic issue  His history would suggest a volume depleted state with n-v-d, however, on objective physical exam, radiologic and by BNP he appears slightly volume overloaded at this time  I would continue to address possible  infection (cystitis), ceftriaxone a safer and more appropriate medication  Continue with supportive care, I will check urine studies at this time for completeness sake of work up  3/1: Renal function is a little worse this AM, I will give 1L of NS today with gentle infusion rate of 75 ml/hr  Patient's FeNa is <1%, given severe cardiac valvulopathy, he will likely benefit from a little more volume expansion given overall clinical findings and probable positive hemodynamic response  3/2: Patient with minimal response to IVF volume expansion  He could tolerate more volume expansion and I will offer another 1L of NS today  Continue to closely monitor the patient's volume status as he appears to have a small ability to tolerate too much volume expansion as it will lead into CHF               3/3: Receiving IVF for probable volume depletion  Would hold IVF if he is drinking due to history of CHF  Check labs in the morning              3/4: Renal function about the same - follow daily labs              3/5: little change clinically  Receiving diuretic and has mild edema              3/6: renal function stable but above baseline  Weights have risen  Will order a dose of furosemide today  2  CKD III with baseline Cr ~1 4 mg/dL  3  Hypernatremia -resolved  4   HTN + CKD + CHF -patient appears compensated at this time  5  Cystitis               Follow up cultures, continue with ceftriaxone  Culture remains pending  6  Nephrolithiasis with mild right sided hydroureteronephrosis              No intervention from Urology at this time, we will continue to treat medically  7  Severe pulmonary HTN  8  Mod to severe mitral regurgitation  9  Hyperkalemia" recheck and place potassium restriction in diet      Follow up reason for today's visit: RADAMES/CKD    Ureterolithiasis    Patient Active Problem List   Diagnosis    Kidney stone    CKD (chronic kidney disease), stage III    COPD (chronic obstructive pulmonary disease) (HCC)    Hyperlipidemia    Essential hypertension    Type 2 diabetes mellitus (HCC)    Acute on chronic systolic CHF (congestive heart failure) (HCC)    Elevated troponin I level    Acute kidney injury (HCC)    Ureterolithiasis    Thrombocytopenia (HCC)    Leukopenia    Non-ST elevation myocardial infarction (NSTEMI), type 2 (HCC)    Severe aortic stenosis    MRSA colonization    Elevated TSH    Acute cystitis    Pulmonary hypertension    Mitral regurgitation    Aortic regurgitation    Hypothyroidism         Subjective:     Patient without acute events overnight  Objective:     Vitals: Blood pressure 125/67, pulse 65, temperature (!) 96 7 °F (35 9 °C), temperature source Temporal, resp  rate 18, height 5' 5" (1 651 m), weight 82 4 kg (181 lb 10 5 oz), SpO2 96 %  ,Body mass index is 30 23 kg/m²  Weight (last 2 days)     Date/Time   Weight    03/06/18 0558  82 4 (181 66)    03/05/18 0600  80 6 (177 69)    03/04/18 0600  79 2 (174 6)                Intake/Output Summary (Last 24 hours) at 03/06/18 1024  Last data filed at 03/06/18 0845   Gross per 24 hour   Intake              360 ml   Output              235 ml   Net              125 ml     I/O last 3 completed shifts:   In: 1213 3 [P O :480; I V :733 3]  Out: 235 [Urine:235]         Physical Exam: /67 (BP Location: Right arm) Pulse 65   Temp (!) 96 7 °F (35 9 °C) (Temporal)   Resp 18   Ht 5' 5" (1 651 m)   Wt 82 4 kg (181 lb 10 5 oz)   SpO2 96%   BMI 30 23 kg/m²     General Appearance:    Alert, cooperative, no distress, appears stated age   Head:    Normocephalic, without obvious abnormality, atraumatic   Eyes:    Conjunctiva/corneas clear   Ears:    Normal external ears   Nose:   Nares normal, septum midline, mucosa normal, no drainage    or sinus tenderness   Throat:   Lips, mucosa, and tongue normal; teeth and gums normal   Neck:   Supple   Back:     Symmetric, no curvature, ROM normal, no CVA tenderness   Lungs:     Exp rhonchi bilaterally, respirations unlabored   Chest wall:    No tenderness or deformity   Heart:    Regular rate and rhythm, S1 and S2 normal, no murmur, rub   or gallop   Abdomen:     Soft, non-tender, bowel sounds active   Extremities:   Extremities normal, atraumatic, no cyanosis, +2 edema bilaterally 2+ edema   Skin:   Skin color, texture, turgor normal, no rashes or lesions   Lymph nodes:   Cervical normal   Neurologic:   CNII-XII intact            Lab, Imaging and other studies: I have personally reviewed pertinent labs  CBC:   Lab Results   Component Value Date    WBC 4 76 03/06/2018    HGB 10 6 (L) 03/06/2018    HCT 35 5 (L) 03/06/2018    MCV 85 03/06/2018     (L) 03/06/2018    MCH 25 4 (L) 03/06/2018    MCHC 29 9 (L) 03/06/2018    RDW 18 9 (H) 03/06/2018    MPV 10 4 03/06/2018     CMP:   Lab Results   Component Value Date     03/06/2018    K 5 1 03/06/2018     03/06/2018    CO2 28 03/06/2018    ANIONGAP 6 03/06/2018    BUN 55 (H) 03/06/2018    CREATININE 1 87 (H) 03/06/2018    GLUCOSE 118 03/06/2018    CALCIUM 8 6 03/06/2018    EGFR 31 03/06/2018             Results from last 7 days  Lab Units 03/06/18  0443 03/05/18  0436 03/04/18  1225 03/04/18  0606 03/03/18  0416   SODIUM mmol/L 138 138 138 138 137   POTASSIUM mmol/L 5 1 5 3 5 4* 5 8* 5 2   CHLORIDE mmol/L 104 105 105 104 105 CO2 mmol/L 28 24 27 25 24   BUN mg/dL 55* 53* 53* 52* 50*   CREATININE mg/dL 1 87* 1 84* 1 85* 1 83* 1 74*   CALCIUM mg/dL 8 6 8 2* 8 4 8 3 7 8*   TOTAL PROTEIN g/dL  --  6 5  --  6 7 6 5   BILIRUBIN TOTAL mg/dL  --  0 40  --  0 50 0 40   ALK PHOS U/L  --  102  --  101 91   ALT U/L  --  21  --  21 20   AST U/L  --  22  --  22 26   GLUCOSE RANDOM mg/dL 118 108 122 115 118         Phosphorus:   No results found for: PHOS  Magnesium:   No results found for: MG  Urinalysis: No results found for: COLORU, CLARITYU, SPECGRAV, PHUR, LEUKOCYTESUR, NITRITE, PROTEINUA, GLUCOSEU, KETONESU, BILIRUBINUR, BLOODU  Ionized Calcium: No results found for: CAION  Coagulation:   No results found for: PT, INR, APTT  Troponin:   No results found for: TROPONINI  ABG: No results found for: PHART, DQS0UFQ, PO2ART, BTR1KIW, N4OQABCE, BEART, SOURCE  Radiology review:     IMAGING  Procedure: Ct Abdomen Pelvis Wo Contrast    Result Date: 2/27/2018  Narrative: CT ABDOMEN AND PELVIS WITHOUT IV CONTRAST INDICATION: diarhea abdm distention  History taken directly from the electronic ordering system  COMPARISON: None  TECHNIQUE:  CT examination of the abdomen and pelvis was performed without intravenous contrast   Axial, sagittal, and coronal 2D reformatted images were created from the source data and submitted for interpretation  Radiation dose length product (DLP) for this visit:  721 9 mGy-cm   This examination, like all CT scans performed in the [facility], was performed utilizing techniques to minimize radiation dose exposure, including the use of iterative reconstruction and automated exposure control  Enteric contrast was not administered  FINDINGS: ABDOMEN LOWER CHEST:  Bilateral pleural effusions, right greater than left  Atelectatic versus consolidative changes at the left base  LIVER/BILIARY TREE:  Within limitations of noncontrast, no focal lesion  GALLBLADDER:  No calcified gallstones   No pericholecystic inflammatory change  SPLEEN:  Unremarkable  PANCREAS:  Much of the pancreas is atrophic  ADRENAL GLANDS:  Unremarkable  KIDNEYS/URETERS:  Simple appearing left renal cysts  6 mm catheter in the distal left ureter just proximal to the UVJ  Mild left hydroureter without substantial hydronephrosis  Punctate calculus in the superior pole of the left kidney  STOMACH AND BOWEL:  No evidence for bowel obstruction  Diverticulosis  No clear evidence for diverticulitis or colitis  APPENDIX:  No findings to suggest appendicitis  ABDOMINOPELVIC CAVITY:  Mild abdominal ascites  Scattered lymph nodes in the retroperitoneum which are nonspecific  VESSELS:  Atherosclerotic changes are present  No evidence of aneurysm  PELVIS REPRODUCTIVE ORGANS:  Prostatomegaly URINARY BLADDER:  Bladder is slightly thick-walled  Correlate with UA for cystitis in the possibility of chronic bladder outlet obstruction  ABDOMINAL WALL/INGUINAL REGIONS:  Anasarca  OSSEOUS STRUCTURES:  No acute fracture or destructive osseous lesion  Impression: 6 mm calculus in the distal left ureter just proximal to the UVJ resulting in mild left-sided hydroureter but no significant hydronephrosis  Bilateral pleural effusions, right greater than left with atelectatic versus consolidative change at the left base  Thick-walled bladder  Could reflect cystitis versus sequela chronic outlet obstruction  Mild abdominal ascites  Workstation performed: [id number]     Procedure: Xr Chest 1 View Portable    Result Date: 2/27/2018  Narrative: CHEST INDICATION:  Shortness of breath  diarrhea COMPARISON:  October 4, 2017  EXAM PERFORMED/VIEWS:  XR CHEST PORTABLE FINDINGS: Heart enlarged  ICD present  Pulmonary vessels prominent and indistinct  Opacification in the lower left hemithorax, probably a combination of pleural effusion and atelectasis or consolidation in the left lower lobe  Right lung clear  No pneumothorax   Osseous structures appear within normal limits for patient age      Impression: 1  Cardiomegaly and pulmonary vascular congestion  2   Probable small left pleural effusion and atelectasis or consolidation in the left lower lobe  Workstation performed: [id number]     Procedure: Us Kidney And Bladder    Result Date: 2/28/2018  Narrative: RENAL ULTRASOUND INDICATION: RENAL FAILURE, ACUTE RENAL FAILURE, CHRONIC arf, crf COMPARISON: CT 2/27/2018 TECHNIQUE:   Ultrasound of the retroperitoneum was performed with a curvilinear transducer utilizing volumetric sweeps and still imaging techniques  FINDINGS: KIDNEYS: Symmetric and normal size  Right kidney:  9 6 cm  Left kidney:  9 8 cm  Right kidney The renal parenchyma is diffusely echogenic consistent with medical renal disease  No suspicious masses detected  No hydronephrosis  No shadowing calculi  No perinephric fluid collections  Left kidney The renal parenchyma is diffusely echogenic consistent with medical renal disease  No suspicious masses detected  Left renal simple cysts  No hydronephrosis  No shadowing calculi  No perinephric fluid collections  URETERS: Nonvisualized  BLADDER: Diffuse bladder wall thickening could relate to cystitis or outlet obstruction changes, correlate clinically  4 7 x 2 9 x 3 6 cm prostate protrudes into the base of the bladder  Small amount of ascites noted in the left upper abdominal quadrant as on CT  Impression: 1  Bilateral medical renal disease  2   No hydronephrosis  3   Left renal cysts  4   Diffuse bladder wall thickening could relate to cystitis or outlet obstruction changes, correlate clinically  4 7 x 2 9 x 3 6 cm prostate protrudes into the base of the bladder   Workstation performed: [id number]       Current Facility-Administered Medications   Medication Dose Route Frequency    acetaminophen (TYLENOL) tablet 650 mg  650 mg Oral Q6H PRN    aspirin chewable tablet 81 mg  81 mg Oral Daily    atorvastatin (LIPITOR) tablet 40 mg  40 mg Oral Daily With [employer] b complex-vitamin C-folic acid (NEPHROCAPS) capsule 1 capsule  1 capsule Oral Daily With Dinner    budesonide-formoterol (SYMBICORT) 160-4 5 mcg/act inhaler 2 puff  2 puff Inhalation BID    cefTRIAXone (ROCEPHIN) IVPB (premix) 1,000 mg  1,000 mg Intravenous Q24H    fluticasone (FLONASE) 50 mcg/act nasal spray 1 spray  1 spray Nasal Daily    heparin (porcine) subcutaneous injection 5,000 Units  5,000 Units Subcutaneous Q8H [facility]    insulin lispro (HumaLOG) 100 units/mL subcutaneous injection 1-6 Units  1-6 Units Subcutaneous TID AC    insulin lispro (HumaLOG) 100 units/mL subcutaneous injection 1-6 Units  1-6 Units Subcutaneous HS    levalbuterol (XOPENEX) inhalation solution 1 25 mg  1 25 mg Nebulization Q6H PRN    levothyroxine tablet 150 mcg  150 mcg Oral Early Morning    melatonin tablet 3 mg  3 mg Oral HS    metoprolol tartrate (LOPRESSOR) tablet 25 mg  25 mg Oral BID    mupirocin (BACTROBAN) 2 % nasal ointment 1 application  1 application Nasal J90W [facility]    ondansetron (ZOFRAN) injection 4 mg  4 mg Intravenous Q6H PRN    sodium chloride 0 9 % inhalation solution 3 mL  3 mL Nebulization Q6H PRN    tamsulosin (FLOMAX) capsule 0 4 mg  0 4 mg Oral Daily With Dinner    traZODone (DESYREL) tablet 50 mg  50 mg Oral HS     Medications Discontinued During This Encounter   Medication Reason    aspirin 81 MG tablet     metoprolol tartrate (LOPRESSOR) 50 mg tablet Dose adjustment    simvastatin (ZOCOR) 40 mg tablet Dose adjustment    albuterol (PROVENTIL HFA,VENTOLIN HFA) 90 mcg/act inhaler     allopurinol (ZYLOPRIM) 100 mg tablet     Calcium Carbonate (CALCIUM 600 HIGH POTENCY PO)     DULoxetine (CYMBALTA) 30 mg delayed release capsule     fluticasone-salmeterol (ADVAIR) 250-50 mcg/dose inhaler     furosemide (LASIX) 40 mg tablet     glipiZIDE (GLUCOTROL) 5 mg tablet     indomethacin (INDOCIN) 50 mg capsule     IRON PO     Linagliptin (TRADJENTA) 5 MG TABS     losartan (COZAAR) 50 mg tablet     metolazone (ZAROXOLYN) 5 mg tablet     Misc Natural Products (TART CHERRY ADVANCED PO)     Nutritional Supplements (VITAMIN D BOOSTER PO)     oxyCODONE-acetaminophen (PERCOCET) 5-325 mg per tablet     potassium chloride (MICRO-K) 10 MEQ CR capsule     POTASSIUM GLUCONATE PO     vitamin E, tocopherol, 400 units capsule     sodium chloride 0 9 % infusion     bumetanide (BUMEX) tablet 1 mg     finasteride (PROSCAR) tablet 5 mg     hydrOXYzine HCL (ATARAX) tablet 25 mg     zolpidem (AMBIEN) tablet 5 mg     fluticasone (FLONASE) 50 mcg/act nasal spray 2 spray     acetaminophen (TYLENOL) tablet 650 mg     aspirin tablet 325 mg     pantoprazole (PROTONIX) EC tablet 20 mg     pravastatin (PRAVACHOL) tablet 40 mg     cefTRIAXone (ROCEPHIN) IVPB (premix) 1,000 mg     levothyroxine tablet 125 mcg     sodium chloride 0 9 % infusion        Comfort Francis MD

## 2018-03-06 NOTE — PHYSICAL THERAPY NOTE
PT Treatment Note      03/06/18 1003   Restrictions/Precautions   Other Precautions (Contact/isolation; Bed Alarm;Multiple lines;Telemetry;O2;Fall)   Subjective   Subjective Agreeable to therapy  c/o back pain  Bed Mobility   Supine to Sit 4  Minimal assistance   Additional items Assist x 1;HOB elevated; Bedrails; Increased time required;Verbal cues   Transfers   Sit to Stand 4  Minimal assistance   Additional items Assist x 1;Bedrails;Verbal cues  (cues hand placement)   Stand to Sit 4  Minimal assistance   Additional items Assist x 1; Armrests; Verbal cues  (cues hand placement)   Stand pivot 4  Minimal assistance   Additional items Assist x 1;Verbal cues   Ambulation/Elevation   Gait pattern Foward flexed; Short stride   Gait Assistance 4  Minimal assist   Additional items Verbal cues   Assistive Device Rolling walker   Distance 15'   Balance   Static Sitting Fair +   Dynamic Sitting Fair +   Static Standing Fair   Dynamic Standing Linda Low 2894  (with Rw)   Endurance Deficit   Endurance Deficit Yes   Endurance Deficit Description fatigue and lethargy limiting activity   Activity Tolerance   Activity Tolerance Patient limited by fatigue   Exercises   Hip Flexion Sitting;15 reps;Bilateral   Hip Abduction Sitting;15 reps;Bilateral   Hip Adduction Sitting;15 reps;AROM; Bilateral   Knee AROM Long Arc Quad Sitting;15 reps;AROM; Bilateral   Ankle Pumps Sitting;15 reps;AROM; Bilateral   Assessment   Prognosis Good   Problem List Decreased strength;Decreased endurance; Impaired balance;Decreased mobility; Decreased safety awareness   Assessment Pt  performing bed mobility, transfers and ambulation at (min) x1 level of function  Decreased balance, strength, and endurance limiting functional mobility  Pt at increased risk for falls due to decreased safety awareness  Pt unsafe to go home and is in need of PT to address deficits in order to maximize function      Plan   Treatment/Interventions Functional transfer training;LE strengthening/ROM; Therapeutic exercise; Endurance training;Bed mobility;Gait training   Progress Progressing toward goals   Recommendation   Recommendation Short-term skilled PT   Pt  OOB with call bell within reach, scd's connected and turned on and alarm on at end of PT session

## 2018-03-06 NOTE — PROGRESS NOTES
[facility] Progress Note  Patient: Steve Andrews 80 y o  male   MRN: [id number]  PCP: David Crews MD  Unit/Bed#: 828-78 Encounter: [id number]  Date Of Visit: 03/06/18    Assessment:    Principal Problem:    Acute on chronic systolic CHF (congestive heart failure) ([facility] )  Active Problems:    Kidney stone    CKD (chronic kidney disease), stage III    COPD (chronic obstructive pulmonary disease) ([place] [address] )    Hyperlipidemia    Essential hypertension    Type 2 diabetes mellitus (HCC)    Elevated troponin I level    Acute kidney injury ([facility] )    Ureterolithiasis    Thrombocytopenia (HCC)    Leukopenia    Non-ST elevation myocardial infarction (NSTEMI), type 2 (HCC)    Severe aortic stenosis    MRSA colonization    Elevated TSH    Acute cystitis    Pulmonary hypertension    Mitral regurgitation    Aortic regurgitation    Hypothyroidism      Plan:    · Acute on chronic systolic CHF: the patient continues to require 3 5 L of oxygen  The patient did not wear oxygen prior to admission  Patient received Lasix 40 mg IV today  Will continue IV lasix 40 mg daily  Will need to follow renal function closely  · Ureterolithiasis/Acute kidney injury(POA)/Chronic kidney disease stage 3: renal function is stable  Will monitor closely while being diuresed  Urinary retention protocol with bladder scans every shift  Appreciate nephrology's input  · Acute cystitis secondary to proteus mirabilis: patient received 7 day course of IV Rocephin, will discontinue and repeat UA  · Hypothyroidism/Elevated TSH: Continue with increased dose of Levothyroxine 150 mcg PO Qdaily in the early morning  Recheck a TSH level in 4-6 weeks  · MRSA colonization: Nasal Bactroban BID day 3/5  He will need MRSA de-colonization       VTE Pharmacologic Prophylaxis:   Pharmacologic: Heparin  Mechanical VTE Prophylaxis in Place: Yes      Discussions with Specialists or Other Care Team Provider: Nursing, CM, and attending    Education and Discussions with Family / Patient: daughter updated via telephone  Time Spent for Care: 30 minutes  More than 50% of total time spent on counseling and coordination of care as described above  Current Length of Stay: 7 day(s)    Current Patient Status: Inpatient   Certification Statement: The patient will continue to require additional inpatient hospital stay due to continued need for IV diuretics      Code Status: Level 1 - Full Code      Subjective: The patient was seen and examined  The patient doesn't offer any complaints  No acute events overnight    Objective:     Vitals:   Temp (24hrs), Av 4 °F (36 3 °C), Min:96 7 °F (35 9 °C), Max:97 8 °F (36 6 °C)    HR:  [60-65] 65  Resp:  [18] 18  BP: (125-129)/(67-72) 125/67  SpO2:  [96 %-100 %] 96 %  Body mass index is 30 23 kg/m²  Input and Output Summary (last 24 hours): Intake/Output Summary (Last 24 hours) at 18 1408  Last data filed at 18 1337   Gross per 24 hour   Intake              240 ml   Output               80 ml   Net              160 ml       Physical Exam:     Physical Exam   Constitutional: He is oriented to person, place, and time  Vital signs are normal  He appears well-developed and well-nourished  He is active and cooperative  Cardiovascular: Normal rate and regular rhythm  Pulmonary/Chest: Effort normal  He has rales  Poor effort   Abdominal: Soft  Normal appearance and bowel sounds are normal  There is no tenderness  Neurological: He is alert and oriented to person, place, and time  No cranial nerve deficit  Skin: Skin is warm, dry and intact  Nursing note and vitals reviewed          Additional Data:     Labs:      Results from last 7 days  Lab Units 18  0443   WBC Thousand/uL 4 76   HEMOGLOBIN g/dL 10 6*   HEMATOCRIT % 35 5*   PLATELETS Thousands/uL 125*   NEUTROS PCT % 56   LYMPHS PCT % 12*   MONOS PCT % 31*   EOS PCT % 1       Results from last 7 days  Lab Units 18  0443 18  0436 SODIUM mmol/L 138 138   POTASSIUM mmol/L 5 1 5 3   CHLORIDE mmol/L 104 105   CO2 mmol/L 28 24   BUN mg/dL 55* 53*   CREATININE mg/dL 1 87* 1 84*   CALCIUM mg/dL 8 6 8 2*   TOTAL PROTEIN g/dL  --  6 5   BILIRUBIN TOTAL mg/dL  --  0 40   ALK PHOS U/L  --  102   ALT U/L  --  21   AST U/L  --  22   GLUCOSE RANDOM mg/dL 118 108       Results from last 7 days  Lab Units 03/03/18  0416   INR  1 29*       * I Have Reviewed All Lab Data Listed Above  * Additional Pertinent Lab Tests Reviewed: All Labs Within Last 24 Hours Reviewed    Imaging:    Imaging Reports Reviewed Today Include: none  Imaging Personally Reviewed by Myself Includes:  none    Recent Cultures (last 7 days):       Results from last 7 days  Lab Units 02/27/18 2001 02/27/18  1631   BLOOD CULTURE  No Growth After 5 Days  No Growth After 5 Days  --    URINE CULTURE   --  Culture results to follow    30,000-39,000 cfu/ml Proteus mirabilis*       Last 24 Hours Medication List:     Current Facility-Administered Medications:  acetaminophen 650 mg Oral Q6H PRN [employer], DO    aspirin 81 mg Oral Daily [employer], DO    atorvastatin 40 mg Oral Daily With Clarence Verdin DO    b complex-vitamin C-folic acid 1 capsule Oral Daily With Dinner Sherman Vogel PA-C    budesonide-formoterol 2 puff Inhalation BID Sherman Vogel PA-C    cefTRIAXone 1,000 mg Intravenous Q24H [employer], DO Last Rate: 1,000 mg (03/05/18 1707)   fluticasone 1 spray Nasal Daily [employer], DO    [START ON 3/7/2018] furosemide 40 mg Intravenous Daily Ger Weller PA-C    heparin (porcine) 5,000 Units Subcutaneous Q8H [address] Sherman Vogel PA-C    insulin lispro 1-6 Units Subcutaneous TID AC Sherman Vogel PA-C    insulin lispro 1-6 Units Subcutaneous HS Sherman Vogel PA-C    levalbuterol 1 25 mg Nebulization Q6H PRN [employer], DO    levothyroxine 150 mcg Oral Early Morning Jerry Ochoa, DO    melatonin 3 mg Oral HS [employer], DO    metoprolol tartrate 25 mg Oral BID Wharton Peek, DO    mupirocin 1 application Nasal M86D [address] Jerry Ochoa, DO    ondansetron 4 mg Intravenous Q6H PRN Sherman Vogel PA-C    sodium chloride 3 mL Nebulization Q6H PRN Wharton Samantha, DO    tamsulosin 0 4 mg Oral Daily With Dinner Sherman Vogel PA-C    traZODone 50 mg Oral HS Sherman Vogel PA-C         Today, Patient Was Seen By: Mary Hair PA-C    ** Please Note: This note has been constructed using a voice recognition system   **

## 2018-03-07 ENCOUNTER — APPOINTMENT (INPATIENT)
Dept: RADIOLOGY | Facility: HOSPITAL | Age: 83
DRG: 693 | End: 2018-03-07
Payer: COMMERCIAL

## 2018-03-07 LAB
ANION GAP SERPL CALCULATED.3IONS-SCNC: 9 MMOL/L (ref 4–13)
ATRIAL RATE: 178 BPM
BASOPHILS # BLD AUTO: 0.02 THOUSANDS/ΜL (ref 0–0.1)
BASOPHILS NFR BLD AUTO: 0 % (ref 0–1)
BUN SERPL-MCNC: 55 MG/DL (ref 5–25)
CALCIUM SERPL-MCNC: 8.3 MG/DL (ref 8.3–10.1)
CHLORIDE SERPL-SCNC: 104 MMOL/L (ref 100–108)
CO2 SERPL-SCNC: 25 MMOL/L (ref 21–32)
CREAT SERPL-MCNC: 1.82 MG/DL (ref 0.6–1.3)
EOSINOPHIL # BLD AUTO: 0.03 THOUSAND/ΜL (ref 0–0.61)
EOSINOPHIL NFR BLD AUTO: 1 % (ref 0–6)
ERYTHROCYTE [DISTWIDTH] IN BLOOD BY AUTOMATED COUNT: 18.8 % (ref 11.6–15.1)
GFR SERPL CREATININE-BSD FRML MDRD: 32 ML/MIN/1.73SQ M
GLUCOSE SERPL-MCNC: 111 MG/DL (ref 65–140)
GLUCOSE SERPL-MCNC: 111 MG/DL (ref 65–140)
GLUCOSE SERPL-MCNC: 150 MG/DL (ref 65–140)
GLUCOSE SERPL-MCNC: 154 MG/DL (ref 65–140)
GLUCOSE SERPL-MCNC: 88 MG/DL (ref 65–140)
HCT VFR BLD AUTO: 33.8 % (ref 36.5–49.3)
HGB BLD-MCNC: 10 G/DL (ref 12–17)
LYMPHOCYTES # BLD AUTO: 0.79 THOUSANDS/ΜL (ref 0.6–4.47)
LYMPHOCYTES NFR BLD AUTO: 17 % (ref 14–44)
MCH RBC QN AUTO: 25.1 PG (ref 26.8–34.3)
MCHC RBC AUTO-ENTMCNC: 29.6 G/DL (ref 31.4–37.4)
MCV RBC AUTO: 85 FL (ref 82–98)
MONOCYTES # BLD AUTO: 1.43 THOUSAND/ΜL (ref 0.17–1.22)
MONOCYTES NFR BLD AUTO: 30 % (ref 4–12)
NEUTROPHILS # BLD AUTO: 2.47 THOUSANDS/ΜL (ref 1.85–7.62)
NEUTS SEG NFR BLD AUTO: 52 % (ref 43–75)
PLATELET # BLD AUTO: 131 THOUSANDS/UL (ref 149–390)
PMV BLD AUTO: 10.5 FL (ref 8.9–12.7)
POTASSIUM SERPL-SCNC: 5 MMOL/L (ref 3.5–5.3)
QRS AXIS: -33 DEGREES
QRSD INTERVAL: 130 MS
QT INTERVAL: 448 MS
QTC INTERVAL: 448 MS
RBC # BLD AUTO: 3.99 MILLION/UL (ref 3.88–5.62)
SODIUM SERPL-SCNC: 138 MMOL/L (ref 136–145)
T WAVE AXIS: 160 DEGREES
VENTRICULAR RATE: 60 BPM
WBC # BLD AUTO: 4.74 THOUSAND/UL (ref 4.31–10.16)

## 2018-03-07 PROCEDURE — 97110 THERAPEUTIC EXERCISES: CPT

## 2018-03-07 PROCEDURE — 94762 N-INVAS EAR/PLS OXIMTRY CONT: CPT

## 2018-03-07 PROCEDURE — 97530 THERAPEUTIC ACTIVITIES: CPT

## 2018-03-07 PROCEDURE — 82948 REAGENT STRIP/BLOOD GLUCOSE: CPT

## 2018-03-07 PROCEDURE — 93005 ELECTROCARDIOGRAM TRACING: CPT | Performed by: HOSPITALIST

## 2018-03-07 PROCEDURE — 85025 COMPLETE CBC W/AUTO DIFF WBC: CPT | Performed by: PHYSICIAN ASSISTANT

## 2018-03-07 PROCEDURE — 94760 N-INVAS EAR/PLS OXIMETRY 1: CPT

## 2018-03-07 PROCEDURE — 94640 AIRWAY INHALATION TREATMENT: CPT

## 2018-03-07 PROCEDURE — 99232 SBSQ HOSP IP/OBS MODERATE 35: CPT | Performed by: PHYSICIAN ASSISTANT

## 2018-03-07 PROCEDURE — 97535 SELF CARE MNGMENT TRAINING: CPT

## 2018-03-07 PROCEDURE — 80048 BASIC METABOLIC PNL TOTAL CA: CPT | Performed by: INTERNAL MEDICINE

## 2018-03-07 PROCEDURE — 71045 X-RAY EXAM CHEST 1 VIEW: CPT

## 2018-03-07 PROCEDURE — 93010 ELECTROCARDIOGRAM REPORT: CPT | Performed by: INTERNAL MEDICINE

## 2018-03-07 RX ORDER — HYDROXYZINE HYDROCHLORIDE 25 MG/1
25 TABLET, FILM COATED ORAL ONCE
Status: DISCONTINUED | OUTPATIENT
Start: 2018-03-07 | End: 2018-03-07

## 2018-03-07 RX ORDER — FUROSEMIDE 10 MG/ML
40 INJECTION INTRAMUSCULAR; INTRAVENOUS
Status: DISCONTINUED | OUTPATIENT
Start: 2018-03-07 | End: 2018-03-10

## 2018-03-07 RX ORDER — FUROSEMIDE 10 MG/ML
40 INJECTION INTRAMUSCULAR; INTRAVENOUS ONCE
Status: COMPLETED | OUTPATIENT
Start: 2018-03-07 | End: 2018-03-07

## 2018-03-07 RX ADMIN — INSULIN LISPRO 1 UNITS: 100 INJECTION, SOLUTION INTRAVENOUS; SUBCUTANEOUS at 11:42

## 2018-03-07 RX ADMIN — FLUTICASONE PROPIONATE 1 SPRAY: 50 SPRAY, METERED NASAL at 09:07

## 2018-03-07 RX ADMIN — TRAZODONE HYDROCHLORIDE 50 MG: 50 TABLET ORAL at 22:12

## 2018-03-07 RX ADMIN — TAMSULOSIN HYDROCHLORIDE 0.4 MG: 0.4 CAPSULE ORAL at 17:20

## 2018-03-07 RX ADMIN — MUPIROCIN 1 APPLICATION: 20 OINTMENT TOPICAL at 09:25

## 2018-03-07 RX ADMIN — BUDESONIDE AND FORMOTEROL FUMARATE DIHYDRATE 2 PUFF: 160; 4.5 AEROSOL RESPIRATORY (INHALATION) at 20:45

## 2018-03-07 RX ADMIN — FUROSEMIDE 40 MG: 10 INJECTION, SOLUTION INTRAMUSCULAR; INTRAVENOUS at 16:12

## 2018-03-07 RX ADMIN — FUROSEMIDE 40 MG: 10 INJECTION, SOLUTION INTRAMUSCULAR; INTRAVENOUS at 16:11

## 2018-03-07 RX ADMIN — Medication 1 CAPSULE: at 17:20

## 2018-03-07 RX ADMIN — LEVOTHYROXINE SODIUM 150 MCG: 150 TABLET ORAL at 05:00

## 2018-03-07 RX ADMIN — BUDESONIDE AND FORMOTEROL FUMARATE DIHYDRATE 2 PUFF: 160; 4.5 AEROSOL RESPIRATORY (INHALATION) at 09:08

## 2018-03-07 RX ADMIN — LEVALBUTEROL 1.25 MG: 1.25 SOLUTION, CONCENTRATE RESPIRATORY (INHALATION) at 16:16

## 2018-03-07 RX ADMIN — ACETAMINOPHEN 650 MG: 325 TABLET, FILM COATED ORAL at 09:38

## 2018-03-07 RX ADMIN — HEPARIN SODIUM 5000 UNITS: 5000 INJECTION, SOLUTION INTRAVENOUS; SUBCUTANEOUS at 21:43

## 2018-03-07 RX ADMIN — MELATONIN 3 MG: 3 TAB ORAL at 22:12

## 2018-03-07 RX ADMIN — ASPIRIN 81 MG 81 MG: 81 TABLET ORAL at 09:24

## 2018-03-07 RX ADMIN — ATORVASTATIN CALCIUM 40 MG: 40 TABLET, FILM COATED ORAL at 17:20

## 2018-03-07 RX ADMIN — HEPARIN SODIUM 5000 UNITS: 5000 INJECTION, SOLUTION INTRAVENOUS; SUBCUTANEOUS at 05:00

## 2018-03-07 RX ADMIN — METOPROLOL TARTRATE 25 MG: 25 TABLET ORAL at 17:20

## 2018-03-07 RX ADMIN — FUROSEMIDE 40 MG: 10 INJECTION, SOLUTION INTRAMUSCULAR; INTRAVENOUS at 09:25

## 2018-03-07 RX ADMIN — METOPROLOL TARTRATE 25 MG: 25 TABLET ORAL at 09:25

## 2018-03-07 RX ADMIN — HEPARIN SODIUM 5000 UNITS: 5000 INJECTION, SOLUTION INTRAVENOUS; SUBCUTANEOUS at 15:03

## 2018-03-07 RX ADMIN — ISODIUM CHLORIDE 3 ML: 0.03 SOLUTION RESPIRATORY (INHALATION) at 16:16

## 2018-03-07 RX ADMIN — MUPIROCIN 1 APPLICATION: 20 OINTMENT TOPICAL at 21:43

## 2018-03-07 NOTE — PHYSICAL THERAPY NOTE
PT Treatment Note      03/07/18 1048   Restrictions/Precautions   Other Precautions (Contact/isolation; Bed Alarm; Chair Alarm;O2;Fall Risk;Telemet)   Subjective   Subjective Lethargic  Pt  just returned to bed from being [address] [facility] in [address] [facility] chair  Bed Mobility   Additional Comments deferred   Endurance Deficit   Endurance Deficit Yes   Activity Tolerance   Activity Tolerance Patient limited by fatigue   Exercises   Heelslides Supine;15 reps;AAROM;PROM   Hip Flexion Supine;15 reps;PROM;AAROM   Hip Abduction Supine;15 reps;AAROM;PROM   Hip Adduction Supine;15 reps;AAROM;PROM   Ankle Pumps Supine;15 reps;AAROM;PROM   Assessment   Prognosis Good   Problem List Decreased strength;Decreased endurance; Impaired balance;Decreased mobility; Decreased safety awareness; Impaired judgement;Decreased cognition   Assessment Performed therapeutic exercises as above  Pt  lethargic, minimal participation  Pt would benefit from continued PT as well as d/c back to [facility] with skilled PT when medically stable  Plan   Treatment/Interventions Functional transfer training;LE strengthening/ROM; Therapeutic exercise; Endurance training;Bed mobility;Gait training   Progress Slow progress, decreased activity tolerance   Recommendation   Recommendation Short-term skilled PT   Pt  In bed  with call bell within reach, scd's connected and turned on and alarm on at end of PT session

## 2018-03-07 NOTE — PROGRESS NOTES
Progress Note - Nephrology   Yokasta Ortega 80 y o  male MRN: [id number]  Unit/Bed#: 411-01 Encounter: [id number]    A/P:  1  RADAMES on top of CKD               Patient's cause of RADAMES a little difficult to ascertain at this time  He has mild hydronephrosis and we cannot blame the entirety of RADAMES on this finding, there may be a small component of reduced renal function due to this anatomic issue  His history would suggest a volume depleted state with n-v-d, however, on objective physical exam, radiologic and by BNP he appears slightly volume overloaded at this time  I would continue to address possible  infection (cystitis), ceftriaxone a safer and more appropriate medication  Continue with supportive care, I will check urine studies at this time for completeness sake of work up  3/1: Renal function is a little worse this AM, I will give 1L of NS today with gentle infusion rate of 75 ml/hr  Patient's FeNa is <1%, given severe cardiac valvulopathy, he will likely benefit from a little more volume expansion given overall clinical findings and probable positive hemodynamic response  3/2: Patient with minimal response to IVF volume expansion  He could tolerate more volume expansion and I will offer another 1L of NS today  Continue to closely monitor the patient's volume status as he appears to have a small ability to tolerate too much volume expansion as it will lead into CHF               3/3: Receiving IVF for probable volume depletion  Would hold IVF if he is drinking due to history of CHF  Check labs in the morning              3/4: Renal function about the same - follow daily labs              3/5: little change clinically  Receiving diuretic and has mild edema              3/6: renal function stable but above baseline  Weights have risen  Will order a dose of furosemide today              3/7: Renal function stable, however would benefit from increased diuresis  2   CKD III with baseline Cr ~1 4 mg/dL  3  Hypernatremia -resolved  4  HTN + CKD + CHF -patient appears compensated at this time  5  Cystitis               Follow up cultures, continue with ceftriaxone  Culture remains pending  6  Nephrolithiasis with mild right sided hydroureteronephrosis              No intervention from Urology at this time, we will continue to treat medically  7  Severe pulmonary HTN  8  Mod to severe mitral regurgitation  9  Hyperkalemia" recheck and place potassium restriction in diet      Follow up reason for today's visit: RADAMES/CKD    Acute on chronic systolic CHF (congestive heart failure) ([facility] [address] )    Patient Active Problem List   Diagnosis    Kidney stone    CKD (chronic kidney disease), stage III    COPD (chronic obstructive pulmonary disease) (HCC)    Hyperlipidemia    Essential hypertension    Type 2 diabetes mellitus (HCC)    Acute on chronic systolic CHF (congestive heart failure) (HCC)    Elevated troponin I level    Acute kidney injury (HCC)    Ureterolithiasis    Thrombocytopenia (HCC)    Leukopenia    Non-ST elevation myocardial infarction (NSTEMI), type 2 (HCC)    Severe aortic stenosis    MRSA colonization    Elevated TSH    Acute cystitis    Pulmonary hypertension    Mitral regurgitation    Aortic regurgitation    Hypothyroidism         Subjective:     Patient without acute events overnight  Objective:     Vitals: Blood pressure 120/60, pulse 61, temperature (!) 97 2 °F (36 2 °C), temperature source Temporal, resp  rate 18, height 5' 5" (1 651 m), weight 82 1 kg (181 lb), SpO2 100 %  ,Body mass index is 30 12 kg/m²      Weight (last 2 days)     Date/Time   Weight    03/07/18 0600  82 1 (181)    03/06/18 0558  82 4 (181 66)    03/05/18 0600  80 6 (177 69)                Intake/Output Summary (Last 24 hours) at 03/07/18 1140  Last data filed at 03/07/18 1126   Gross per 24 hour   Intake              600 ml   Output              225 ml   Net              375 ml     I/O last 3 completed shifts: In: 18 [P O :480]  Out: 125 [Urine:125]         Physical Exam: /60 (BP Location: Right arm)   Pulse 61   Temp (!) 97 2 °F (36 2 °C) (Temporal)   Resp 18   Ht 5' 5" (1 651 m)   Wt 82 1 kg (181 lb)   SpO2 100%   BMI 30 12 kg/m²     General Appearance:    Alert, cooperative, no distress, appears stated age   Head:    Normocephalic, without obvious abnormality, atraumatic   Eyes:    Conjunctiva/corneas clear   Ears:    Normal external ears   Nose:   Nares normal, septum midline, mucosa normal, no drainage    or sinus tenderness   Throat:   Lips, mucosa, and tongue normal; teeth and gums normal   Neck:   Supple   Back:     Symmetric, no curvature, ROM normal, no CVA tenderness   Lungs:     Has crackes 1/3 way up   Chest wall:    No tenderness or deformity   Heart:    Regular rate and rhythm, S1 and S2 normal, no murmur, rub   or gallop   Abdomen:     Soft, non-tender, bowel sounds active   Extremities:   Extremities normal, atraumatic, no cyanosis, +2 edema bilaterally 2+ edema   Skin:   Skin color, texture, turgor normal, no rashes or lesions   Lymph nodes:   Cervical normal   Neurologic:   CNII-XII intact            Lab, Imaging and other studies: I have personally reviewed pertinent labs  CBC:   Lab Results   Component Value Date    WBC 4 74 03/07/2018    HGB 10 0 (L) 03/07/2018    HCT 33 8 (L) 03/07/2018    MCV 85 03/07/2018     (L) 03/07/2018    MCH 25 1 (L) 03/07/2018    MCHC 29 6 (L) 03/07/2018    RDW 18 8 (H) 03/07/2018    MPV 10 5 03/07/2018     CMP:   Lab Results   Component Value Date     03/07/2018    K 5 0 03/07/2018     03/07/2018    CO2 25 03/07/2018    ANIONGAP 9 03/07/2018    BUN 55 (H) 03/07/2018    CREATININE 1 82 (H) 03/07/2018    GLUCOSE 111 03/07/2018    CALCIUM 8 3 03/07/2018    EGFR 32 03/07/2018             Results from last 7 days  Lab Units 03/07/18  0456 03/06/18  0443 03/05/18  0436  03/04/18  0606 03/03/18  0416   SODIUM mmol/L 138 138 138  < > 138 137   POTASSIUM mmol/L 5 0 5 1 5 3  < > 5 8* 5 2   CHLORIDE mmol/L 104 104 105  < > 104 105   CO2 mmol/L 25 28 24  < > 25 24   BUN mg/dL 55* 55* 53*  < > 52* 50*   CREATININE mg/dL 1 82* 1 87* 1 84*  < > 1 83* 1 74*   CALCIUM mg/dL 8 3 8 6 8 2*  < > 8 3 7 8*   TOTAL PROTEIN g/dL  --   --  6 5  --  6 7 6 5   BILIRUBIN TOTAL mg/dL  --   --  0 40  --  0 50 0 40   ALK PHOS U/L  --   --  102  --  101 91   ALT U/L  --   --  21  --  21 20   AST U/L  --   --  22  --  22 26   GLUCOSE RANDOM mg/dL 111 118 108  < > 115 118   < > = values in this interval not displayed  Phosphorus:   No results found for: PHOS  Magnesium:   No results found for: MG  Urinalysis:   Lab Results   Component Value Date    COLORU Yellow 03/06/2018    CLARITYU Clear 03/06/2018    SPECGRAV 1 015 03/06/2018    PHUR 5 5 03/06/2018    LEUKOCYTESUR Negative 03/06/2018    NITRITE Negative 03/06/2018    PROTEINUA Negative 03/06/2018    GLUCOSEU Negative 03/06/2018    KETONESU Negative 03/06/2018    BILIRUBINUR Negative 03/06/2018    BLOODU Negative 03/06/2018     Ionized Calcium: No results found for: CAION  Coagulation:   No results found for: PT, INR, APTT  Troponin:   No results found for: TROPONINI  ABG: No results found for: PHART, DMB2TAU, PO2ART, ERJ7ERY, Q3ZCVIEC, BEART, SOURCE  Radiology review:     IMAGING  Procedure: Ct Abdomen Pelvis Wo Contrast    Result Date: 2/27/2018  Narrative: CT ABDOMEN AND PELVIS WITHOUT IV CONTRAST INDICATION: diarhea abdm distention  History taken directly from the electronic ordering system  COMPARISON: None  TECHNIQUE:  CT examination of the abdomen and pelvis was performed without intravenous contrast   Axial, sagittal, and coronal 2D reformatted images were created from the source data and submitted for interpretation  Radiation dose length product (DLP) for this visit:  721 9 mGy-cm     This examination, like all CT scans performed in the [facility], was performed utilizing techniques to minimize radiation dose exposure, including the use of iterative reconstruction and automated exposure control  Enteric contrast was not administered  FINDINGS: ABDOMEN LOWER CHEST:  Bilateral pleural effusions, right greater than left  Atelectatic versus consolidative changes at the left base  LIVER/BILIARY TREE:  Within limitations of noncontrast, no focal lesion  GALLBLADDER:  No calcified gallstones  No pericholecystic inflammatory change  SPLEEN:  Unremarkable  PANCREAS:  Much of the pancreas is atrophic  ADRENAL GLANDS:  Unremarkable  KIDNEYS/URETERS:  Simple appearing left renal cysts  6 mm catheter in the distal left ureter just proximal to the UVJ  Mild left hydroureter without substantial hydronephrosis  Punctate calculus in the superior pole of the left kidney  STOMACH AND BOWEL:  No evidence for bowel obstruction  Diverticulosis  No clear evidence for diverticulitis or colitis  APPENDIX:  No findings to suggest appendicitis  ABDOMINOPELVIC CAVITY:  Mild abdominal ascites  Scattered lymph nodes in the retroperitoneum which are nonspecific  VESSELS:  Atherosclerotic changes are present  No evidence of aneurysm  PELVIS REPRODUCTIVE ORGANS:  Prostatomegaly URINARY BLADDER:  Bladder is slightly thick-walled  Correlate with UA for cystitis in the possibility of chronic bladder outlet obstruction  ABDOMINAL WALL/INGUINAL REGIONS:  Anasarca  OSSEOUS STRUCTURES:  No acute fracture or destructive osseous lesion  Impression: 6 mm calculus in the distal left ureter just proximal to the UVJ resulting in mild left-sided hydroureter but no significant hydronephrosis  Bilateral pleural effusions, right greater than left with atelectatic versus consolidative change at the left base  Thick-walled bladder  Could reflect cystitis versus sequela chronic outlet obstruction  Mild abdominal ascites   Workstation performed: [id number]     Procedure: Xr Chest 1 View Portable    Result Date: 2/27/2018  Narrative: CHEST INDICATION:  Shortness of breath  diarrhea COMPARISON:  October 4, 2017  EXAM PERFORMED/VIEWS:  XR CHEST PORTABLE FINDINGS: Heart enlarged  ICD present  Pulmonary vessels prominent and indistinct  Opacification in the lower left hemithorax, probably a combination of pleural effusion and atelectasis or consolidation in the left lower lobe  Right lung clear  No pneumothorax  Osseous structures appear within normal limits for patient age  Impression: 1  Cardiomegaly and pulmonary vascular congestion  2   Probable small left pleural effusion and atelectasis or consolidation in the left lower lobe  Workstation performed: [id number]     Procedure: Us Kidney And Bladder    Result Date: 2/28/2018  Narrative: RENAL ULTRASOUND INDICATION: RENAL FAILURE, ACUTE RENAL FAILURE, CHRONIC arf, crf COMPARISON: CT 2/27/2018 TECHNIQUE:   Ultrasound of the retroperitoneum was performed with a curvilinear transducer utilizing volumetric sweeps and still imaging techniques  FINDINGS: KIDNEYS: Symmetric and normal size  Right kidney:  9 6 cm  Left kidney:  9 8 cm  Right kidney The renal parenchyma is diffusely echogenic consistent with medical renal disease  No suspicious masses detected  No hydronephrosis  No shadowing calculi  No perinephric fluid collections  Left kidney The renal parenchyma is diffusely echogenic consistent with medical renal disease  No suspicious masses detected  Left renal simple cysts  No hydronephrosis  No shadowing calculi  No perinephric fluid collections  URETERS: Nonvisualized  BLADDER: Diffuse bladder wall thickening could relate to cystitis or outlet obstruction changes, correlate clinically  4 7 x 2 9 x 3 6 cm prostate protrudes into the base of the bladder  Small amount of ascites noted in the left upper abdominal quadrant as on CT  Impression: 1  Bilateral medical renal disease  2   No hydronephrosis  3   Left renal cysts    4   Diffuse bladder wall thickening could relate to cystitis or outlet obstruction changes, correlate clinically  4 7 x 2 9 x 3 6 cm prostate protrudes into the base of the bladder   Workstation performed: [id number]       Current Facility-Administered Medications   Medication Dose Route Frequency    acetaminophen (TYLENOL) tablet 650 mg  650 mg Oral Q6H PRN    aspirin chewable tablet 81 mg  81 mg Oral Daily    atorvastatin (LIPITOR) tablet 40 mg  40 mg Oral Daily With Dinner    b complex-vitamin C-folic acid (NEPHROCAPS) capsule 1 capsule  1 capsule Oral Daily With Dinner    budesonide-formoterol (SYMBICORT) 160-4 5 mcg/act inhaler 2 puff  2 puff Inhalation BID    fluticasone (FLONASE) 50 mcg/act nasal spray 1 spray  1 spray Nasal Daily    furosemide (LASIX) injection 40 mg  40 mg Intravenous BID (diuretic)    heparin (porcine) subcutaneous injection 5,000 Units  5,000 Units Subcutaneous Q8H [address]    insulin lispro (HumaLOG) 100 units/mL subcutaneous injection 1-6 Units  1-6 Units Subcutaneous TID AC    insulin lispro (HumaLOG) 100 units/mL subcutaneous injection 1-6 Units  1-6 Units Subcutaneous HS    levalbuterol (XOPENEX) inhalation solution 1 25 mg  1 25 mg Nebulization Q6H PRN    levothyroxine tablet 150 mcg  150 mcg Oral Early Morning    melatonin tablet 3 mg  3 mg Oral HS    metoprolol tartrate (LOPRESSOR) tablet 25 mg  25 mg Oral BID    mupirocin (BACTROBAN) 2 % nasal ointment 1 application  1 application Nasal Z20H DIDI    ondansetron (ZOFRAN) injection 4 mg  4 mg Intravenous Q6H PRN    sodium chloride 0 9 % inhalation solution 3 mL  3 mL Nebulization Q6H PRN    tamsulosin (FLOMAX) capsule 0 4 mg  0 4 mg Oral Daily With Dinner    traZODone (DESYREL) tablet 50 mg  50 mg Oral HS     Medications Discontinued During This Encounter   Medication Reason    aspirin 81 MG tablet     metoprolol tartrate (LOPRESSOR) 50 mg tablet Dose adjustment    simvastatin (ZOCOR) 40 mg tablet Dose adjustment    albuterol (PROVENTIL HFA,VENTOLIN HFA) 90 mcg/act inhaler     allopurinol (ZYLOPRIM) 100 mg tablet     Calcium Carbonate (CALCIUM 600 HIGH POTENCY PO)     DULoxetine (CYMBALTA) 30 mg delayed release capsule     fluticasone-salmeterol (ADVAIR) 250-50 mcg/dose inhaler     furosemide (LASIX) 40 mg tablet     glipiZIDE (GLUCOTROL) 5 mg tablet     indomethacin (INDOCIN) 50 mg capsule     IRON PO     Linagliptin (TRADJENTA) 5 MG TABS     losartan (COZAAR) 50 mg tablet     metolazone (ZAROXOLYN) 5 mg tablet     Misc Natural Products (TART CHERRY ADVANCED PO)     Nutritional Supplements (VITAMIN D BOOSTER PO)     oxyCODONE-acetaminophen (PERCOCET) 5-325 mg per tablet     potassium chloride (MICRO-K) 10 MEQ CR capsule     POTASSIUM GLUCONATE PO     vitamin E, tocopherol, 400 units capsule     sodium chloride 0 9 % infusion     bumetanide (BUMEX) tablet 1 mg     finasteride (PROSCAR) tablet 5 mg     hydrOXYzine HCL (ATARAX) tablet 25 mg     zolpidem (AMBIEN) tablet 5 mg     fluticasone (FLONASE) 50 mcg/act nasal spray 2 spray     acetaminophen (TYLENOL) tablet 650 mg     aspirin tablet 325 mg     pantoprazole (PROTONIX) EC tablet 20 mg     pravastatin (PRAVACHOL) tablet 40 mg     cefTRIAXone (ROCEPHIN) IVPB (premix) 1,000 mg     levothyroxine tablet 125 mcg     sodium chloride 0 9 % infusion     cefTRIAXone (ROCEPHIN) IVPB (premix) 1,000 mg     hydrOXYzine HCL (ATARAX) tablet 25 mg     furosemide (LASIX) injection 40 mg        Sindi Gann MD

## 2018-03-07 NOTE — OCCUPATIONAL THERAPY NOTE
[address] Progress Note     Patient Name: Roberta Mendez  DHZEX'B Date: 3/7/2018  Problem List  Patient Active Problem List   Diagnosis    Kidney stone    CKD (chronic kidney disease), stage III    COPD (chronic obstructive pulmonary disease) ([facility] [address] )    Hyperlipidemia    Essential hypertension    Type 2 diabetes mellitus ([facility] [address] )    Acute on chronic systolic CHF (congestive heart failure) (HCC)    Elevated troponin I level    Acute kidney injury (HCC)    Ureterolithiasis    Thrombocytopenia (HCC)    Leukopenia    Non-ST elevation myocardial infarction (NSTEMI), type 2 (HCC)    Severe aortic stenosis    MRSA colonization    Elevated TSH    Acute cystitis    Pulmonary hypertension    Mitral regurgitation    Aortic regurgitation    Hypothyroidism               03/07/18 0926   Restrictions/Precautions   Other Precautions Contact/isolation; Bed Alarm; Chair Alarm;O2;Fall Risk;Telemetry   ADL   Toileting Assistance  3  Moderate Assistance   Toileting Deficit Verbal cueing;Supervison/safety; Increased time to complete; Bedside commode;Clothing management up;Clothing management down   Toileting Comments Pt did not end up going to the bathroom at this time, so hygiene was not assessed  Bed Mobility   Rolling L 5  Supervision   Additional items Bedrails;Verbal cues   Supine to Sit 5  Supervision   Additional items Bedrails;Verbal cues   Additional Comments Max verbal cueing for safety and following commands  Transfers   Sit to Stand 4  Minimal assistance  (x 2)   Additional items Assist x 1;Bedrails;Verbal cues   Stand to Sit 4  Minimal assistance  (x2)   Additional items Assist x 1; Armrests; Verbal cues   Functional Mobility   Functional Mobility 4  Minimal assistance   Additional Comments Pt completed FM within room this date from EOB to toilet and from toilet to bedside chair   FM was minimal with increased verbal cueing and bringing the chair closer due to pt not wanting to follow direction at this time and therapist taking safe route  Additional items Rolling walker   Toilet Transfers   Toilet Transfer From Rolling walker   Toilet Transfer Type To   Toilet Transfer to Standard bedside commode   Toilet Transfer Technique Ambulating   Toilet Transfers Minimal assistance   Toilet Transfers Comments Pt requiring max verbal cueing for safety and manaing lines as well as RW safety cues  Activity Tolerance   Activity Tolerance Patient tolerated treatment well   Assessment   Assessment Pt presents supine in bed this date and agreeable to complete OT at this time  Pt finished session seated in bedside chair with all needs in reach and lines in tact  Pt requiring max verbal cues throughout session to follow direction and for safety  Pt finished session with all needs in reach and lines in tact  O2 powered on and applied

## 2018-03-07 NOTE — PROGRESS NOTES
[facility] Internal Medicine Progress Note  Patient: Jennifer So 80 y o  male   MRN: [id number]  PCP: Rio Toro MD  Unit/Bed#: 462-17 Encounter: [id number]  Date Of Visit: 03/07/18    Assessment:    Principal Problem:    Acute on chronic systolic CHF (congestive heart failure) ([facility] )  Active Problems:    Kidney stone    CKD (chronic kidney disease), stage III    COPD (chronic obstructive pulmonary disease) ([facility] )    Hyperlipidemia    Essential hypertension    Type 2 diabetes mellitus (HCC)    Elevated troponin I level    Acute kidney injury ([facility] )    Ureterolithiasis    Thrombocytopenia (HCC)    Leukopenia    Non-ST elevation myocardial infarction (NSTEMI), type 2 ([facility])    Severe aortic stenosis    MRSA colonization    Elevated TSH    Acute cystitis    Pulmonary hypertension    Mitral regurgitation    Aortic regurgitation    Hypothyroidism      Plan:    · Acute on chronic systolic CHF with EF of 95%/[id number] aortic stenosis/Mitral regurgitation/Aortic regurgitation/Pulmonary hypertension: Chest x-ray shows worsening CHF  Will increase IV Lasix 40 mg daily to BID  Cardiology consulted  Patient continues to need 3 5 L of Oxygen  Will try a condom catheter for accurate I&O's  · Ureterolithiasis/Acute kidney injury(POA)/Chronic kidney disease stage 3: renal function continues to be stable will continue to closely monitoring while diuresing  Urinary retention protocol with bladder scans every shift  Appreciate nephrology's input  · Acute cystitis secondary to proteus mirabilis: patient completed full antibiotic course  Repeat UA negative  · Hypothyroidism/Elevated TSH: continued increased dose of Levothyroxine 150 mcg PO Qdaily in the early morning  Recheck TSH level in 4-6 weeks  · MRSA colonization: Nasal Bactroban BID day 4/5   He will need MRSA de-colonization       VTE Pharmacologic Prophylaxis:   Pharmacologic: Heparin  Mechanical VTE Prophylaxis in Place: Yes      Discussions with Specialists or Other Care Team Provider: nursing, CM, and attending    Education and Discussions with Family / Patient: n/a    Time Spent for Care: 30 minutes  More than 50% of total time spent on counseling and coordination of care as described above  Current Length of Stay: 8 day(s)    Current Patient Status: Inpatient   Certification Statement: The patient will continue to require additional inpatient hospital stay due to continued need for IV diuretics      Code Status: Level 1 - Full Code      Subjective: The patient was seen and examined  The patient moans continuously however he denies any pain  Objective:     Vitals:   Temp (24hrs), Av 4 °F (36 3 °C), Min:97 °F (36 1 °C), Max:98 1 °F (36 7 °C)    HR:  [60-63] 61  Resp:  [18-21] 18  BP: (110-131)/(54-75) 120/60  SpO2:  [95 %-100 %] 100 %  Body mass index is 30 12 kg/m²  Input and Output Summary (last 24 hours): Intake/Output Summary (Last 24 hours) at 18 1055  Last data filed at 18 1003   Gross per 24 hour   Intake              600 ml   Output              175 ml   Net              425 ml       Physical Exam:     Physical Exam   Constitutional: Vital signs are normal  He appears well-developed and well-nourished  He is sleeping and cooperative  He is easily aroused  Cardiovascular: Normal rate and regular rhythm  Murmur heard  Pulmonary/Chest: Effort normal  He has rales  Poor effort, moans during exam     Abdominal: Soft  Normal appearance and bowel sounds are normal  There is no tenderness  Neurological: He is easily aroused  No cranial nerve deficit  He is sleepy but arouses easily  He moans continuously when not sleeping  Will answer some questions and not others  Skin: Skin is warm, dry and intact  Nursing note and vitals reviewed          Additional Data:     Labs:      Results from last 7 days  Lab Units 18  0456   WBC Thousand/uL 4 74   HEMOGLOBIN g/dL 10 0*   HEMATOCRIT % 33 8*   PLATELETS Thousands/uL 131* NEUTROS PCT % 52   LYMPHS PCT % 17   MONOS PCT % 30*   EOS PCT % 1       Results from last 7 days  Lab Units 03/07/18  0456  03/05/18  0436   SODIUM mmol/L 138  < > 138   POTASSIUM mmol/L 5 0  < > 5 3   CHLORIDE mmol/L 104  < > 105   CO2 mmol/L 25  < > 24   BUN mg/dL 55*  < > 53*   CREATININE mg/dL 1 82*  < > 1 84*   CALCIUM mg/dL 8 3  < > 8 2*   TOTAL PROTEIN g/dL  --   --  6 5   BILIRUBIN TOTAL mg/dL  --   --  0 40   ALK PHOS U/L  --   --  102   ALT U/L  --   --  21   AST U/L  --   --  22   GLUCOSE RANDOM mg/dL 111  < > 108   < > = values in this interval not displayed  Results from last 7 days  Lab Units 03/03/18  0416   INR  1 29*       * I Have Reviewed All Lab Data Listed Above  * Additional Pertinent Lab Tests Reviewed:  All Labs Within Last 24 Hours Reviewed    Imaging:    Imaging Reports Reviewed Today Include: CXR  Imaging Personally Reviewed by Myself Includes:  none    Recent Cultures (last 7 days):           Last 24 Hours Medication List:     Current Facility-Administered Medications:  acetaminophen 650 mg Oral Q6H PRN Phuong Cola, DO   aspirin 81 mg Oral Daily Phuong Cola, DO   atorvastatin 40 mg Oral Daily With Clarence Verdin,    b complex-vitamin C-folic acid 1 capsule Oral Daily With Dinner Sherman Vogel PA-C   budesonide-formoterol 2 puff Inhalation BID Sherman Vogel PA-C   fluticasone 1 spray Nasal Daily Phuong Cola, DO   furosemide 40 mg Intravenous BID (diuretic) Phuong Cola, DO   heparin (porcine) 5,000 Units Subcutaneous Q8H [address] Sherman Vogel PA-C   insulin lispro 1-6 Units Subcutaneous TID AC Sherman Vogel PA-C   insulin lispro 1-6 Units Subcutaneous HS Sherman Vogel PA-C   levalbuterol 1 25 mg Nebulization Q6H PRN Phuong Cola, DO   levothyroxine 150 mcg Oral Early Morning Jerry Ochoa, DO   melatonin 3 mg Oral HS Jerry Ochoa,    metoprolol tartrate 25 mg Oral BID Phuong Cola, DO   mupirocin 1 application Nasal [id number],    ondansetron 4 mg Intravenous Q6H PRN Sherman Vogel PA-C   sodium chloride 3 mL Nebulization Q6H PRN Morelia Garcia DO   tamsulosin 0 4 mg Oral Daily With Dinner Sherman Vogel PA-C   traZODone 50 mg Oral HS Sherman Vogel PA-C        Today, Patient Was Seen By: Diana Vega PA-C    ** Please Note: This note has been constructed using a voice recognition system   **

## 2018-03-07 NOTE — PROGRESS NOTES
Progress Note - Cardiology   Yokasta Ortega 80 y o  male MRN: [id number]  Unit/Bed#: 411-01 Encounter: [id number]  03/07/18  12:52 PM        Subjective/Objective   Chief Complaint:   Chief Complaint   Patient presents with    Diarrhea     N/V/D since last night      Subjective:  Not sure if he is reliable,    Objective: Comfortable , no distress at the time of exam      Patient Active Problem List   Diagnosis    Kidney stone    CKD (chronic kidney disease), stage III    COPD (chronic obstructive pulmonary disease) ([place] [address] )    Hyperlipidemia    Essential hypertension    Type 2 diabetes mellitus ([place] [address] )    Acute on chronic systolic CHF (congestive heart failure) ([place] [address] )    Elevated troponin I level    Acute kidney injury ([place] [address] )    Ureterolithiasis    Thrombocytopenia (HCC)    Leukopenia    Non-ST elevation myocardial infarction (NSTEMI), type 2 (HCC)    Severe aortic stenosis    MRSA colonization    Elevated TSH    Acute cystitis    Pulmonary hypertension    Mitral regurgitation    Aortic regurgitation    Hypothyroidism       Vitals: /60 (BP Location: Right arm)   Pulse 61   Temp (!) 97 2 °F (36 2 °C) (Temporal)   Resp 18   Ht 5' 5" (1 651 m)   Wt 82 1 kg (181 lb)   SpO2 100%   BMI 30 12 kg/m²     I/O this shift:  In: 360 [P O :360]  Out: 125 [Urine:125]  Wt Readings from Last 3 Encounters:   03/07/18 82 1 kg (181 lb)   03/16/17 80 7 kg (178 lb)       Intake/Output Summary (Last 24 hours) at 03/07/18 1252  Last data filed at 03/07/18 1224   Gross per 24 hour   Intake              600 ml   Output              250 ml   Net              350 ml     I/O last 3 completed shifts:   In: 480 [P O :480]  Out: 125 [Urine:125]    Invasive Devices     Peripheral Intravenous Line            Peripheral IV 03/05/18 Left;Lateral Wrist 1 day                  Physical Exam:  GEN: Yokasta Ortega appears well, alert and oriented x 3, pleasant and cooperative   HEENT: pupils equal, round, and reactive to light; extraocular muscles intact  NECK: supple, no carotid bruits or JVD  HEART: regular rhythm, normal S1 and S2, no murmurs, clicks, gallops or rubs   LUNGS: clear to auscultation bilaterally; no wheezes,or rhonchi , bilateral rales present  ABDOMEN/GI: normal bowel sounds, soft, no tenderness, no distention  EXTREMITIES/Musculoskeltal: peripheral pulses normal; no clubbing, cyanosis, 2+ edema present  NEURO: no focal findings   SKIN: normal without suspicious lesions on exposed skin            Lab Results:     Results from last 7 days  Lab Units 03/04/18  0606   TROPONIN I ng/mL 0 03     Results from last 7 days  Lab Units 03/07/18 0456 03/06/18 0443 03/05/18  0436   WBC Thousand/uL 4 74 4 76 4 86   HEMOGLOBIN g/dL 10 0* 10 6* 9 8*   HEMATOCRIT % 33 8* 35 5* 33 1*   PLATELETS Thousands/uL 131* 125* 116*           Results from last 7 days  Lab Units 03/07/18 0456 03/06/18 0443 03/05/18  0436  03/04/18  0606 03/03/18  0416   SODIUM mmol/L 138 138 138  < > 138 137   POTASSIUM mmol/L 5 0 5 1 5 3  < > 5 8* 5 2   CHLORIDE mmol/L 104 104 105  < > 104 105   CO2 mmol/L 25 28 24  < > 25 24   BUN mg/dL 55* 55* 53*  < > 52* 50*   CREATININE mg/dL 1 82* 1 87* 1 84*  < > 1 83* 1 74*   CALCIUM mg/dL 8 3 8 6 8 2*  < > 8 3 7 8*   TOTAL PROTEIN g/dL  --   --  6 5  --  6 7 6 5   BILIRUBIN TOTAL mg/dL  --   --  0 40  --  0 50 0 40   ALK PHOS U/L  --   --  102  --  101 91   ALT U/L  --   --  21  --  21 20   AST U/L  --   --  22  --  22 26   GLUCOSE RANDOM mg/dL 111 118 108  < > 115 118   < > = values in this interval not displayed  Results from last 7 days  Lab Units 03/03/18  0416   INR  1 29*       Imaging: I have personally reviewed pertinent reports      EKG:  Sinus rhythm    Scheduled Meds:    Current Facility-Administered Medications:  acetaminophen 650 mg Oral Q6H PRN Judy Barrientos DO   aspirin 81 mg Oral Daily Judy Barrientos DO   atorvastatin 40 mg Oral Daily With Clarence Verdin DO   b complex-vitamin C-folic acid 1 capsule Oral Daily With Dinner Sherman Vogel PA-C   budesonide-formoterol 2 puff Inhalation BID Sherman Vogel PA-C   fluticasone 1 spray Nasal Daily Herminia Herrera, DO   furosemide 40 mg Intravenous BID (diuretic) Herminia Herrera, DO   heparin (porcine) 5,000 Units Subcutaneous Q8H [facility] Sherman Vogel PA-C   insulin lispro 1-6 Units Subcutaneous TID AC Sherman Vogel PA-C   insulin lispro 1-6 Units Subcutaneous HS Sherman Vogel PA-C   levalbuterol 1 25 mg Nebulization Q6H PRN Herminia Herrera, DO   levothyroxine 150 mcg Oral Early Morning Jerry Ochoa, DO   melatonin 3 mg Oral HS Herminia Herrera, DO   metoprolol tartrate 25 mg Oral BID Herminia Herrera, DO   mupirocin 1 application Nasal S43M [facility] Jerry Ochoa, DO   ondansetron 4 mg Intravenous Q6H PRN Sherman Vogel PA-C   sodium chloride 3 mL Nebulization Q6H PRN Herminia Herrera, DO   tamsulosin 0 4 mg Oral Daily With Dinner Sherman Vogel PA-C   traZODone 50 mg Oral HS Sherman Vogel PA-C     Continuous Infusions:       VTE Pharmacologic Prophylaxis: Heparin  VTE Mechanical Prophylaxis: sequential compression device    80year-old with severe aortic stenosis, ischemic cardiomyopathy, status post AICD, admitted with acute kidney injury on chronic kidney disease with obstructive uropathy  Overall renal function has remained stable-creatinine seems to have plateaued around 7 0-8 3  Most recent chest x-ray from this morning demonstrated increased pleural effusions and pulmonary edema  He was on Lasix 40 mg IV for the last few days, currently increased to 40 mg IV twice daily  Plan:    Acute on chronic diastolic and systolic congestive heart failure:  Agree with increasing Lasix dose, I would aim for a net negative in the next 24 hours for at least 1500 mL  If his urine output is less than 1500 cc over the next 12 hours, increase Lasix dose further to 80 mg IV twice daily  Severe aortic stenosis:  TAVR evaluation was recommended    At this point I am not sure if he is a good candidate considering his debility, worsening renal function and dementia and prolonged hospitalizations and nursing home stays  But this could always be readdressed as an outpatient through his primary cardiology group-at [facility]     Ischemic cardiomyopathy:  Continue beta-blocker  Eventually, Ace inhibitor could be considered    I will continue to follow the patient with you  Overall, considering his chronic kidney disease, debilitated status, dementia, severe aortic stenosis, ischemic cardiomyopathy-his prognosis is poor  Counseling / Coordination of Care  Total time spent 20 minutes including teaching and family updates  More than 50% was spent counseling pt and family

## 2018-03-08 ENCOUNTER — TELEPHONE (OUTPATIENT)
Dept: UROLOGY | Facility: CLINIC | Age: 83
End: 2018-03-08

## 2018-03-08 ENCOUNTER — APPOINTMENT (INPATIENT)
Dept: RADIOLOGY | Facility: HOSPITAL | Age: 83
DRG: 693 | End: 2018-03-08
Payer: COMMERCIAL

## 2018-03-08 DIAGNOSIS — N20.0 KIDNEY STONE: Primary | ICD-10-CM

## 2018-03-08 LAB
ALBUMIN SERPL BCP-MCNC: 2.9 G/DL (ref 3.5–5)
ALP SERPL-CCNC: 88 U/L (ref 46–116)
ALT SERPL W P-5'-P-CCNC: 20 U/L (ref 12–78)
ANION GAP SERPL CALCULATED.3IONS-SCNC: 9 MMOL/L (ref 4–13)
ANISOCYTOSIS BLD QL SMEAR: PRESENT
AST SERPL W P-5'-P-CCNC: 18 U/L (ref 5–45)
BASOPHILS # BLD MANUAL: 0 THOUSAND/UL (ref 0–0.1)
BASOPHILS NFR MAR MANUAL: 0 % (ref 0–1)
BILIRUB SERPL-MCNC: 0.6 MG/DL (ref 0.2–1)
BUN SERPL-MCNC: 50 MG/DL (ref 5–25)
CALCIUM SERPL-MCNC: 8.1 MG/DL (ref 8.3–10.1)
CHLORIDE SERPL-SCNC: 104 MMOL/L (ref 100–108)
CO2 SERPL-SCNC: 26 MMOL/L (ref 21–32)
CREAT SERPL-MCNC: 1.66 MG/DL (ref 0.6–1.3)
EOSINOPHIL # BLD MANUAL: 0 THOUSAND/UL (ref 0–0.4)
EOSINOPHIL NFR BLD MANUAL: 0 % (ref 0–6)
ERYTHROCYTE [DISTWIDTH] IN BLOOD BY AUTOMATED COUNT: 18.9 % (ref 11.6–15.1)
GFR SERPL CREATININE-BSD FRML MDRD: 36 ML/MIN/1.73SQ M
GLUCOSE SERPL-MCNC: 103 MG/DL (ref 65–140)
GLUCOSE SERPL-MCNC: 107 MG/DL (ref 65–140)
GLUCOSE SERPL-MCNC: 131 MG/DL (ref 65–140)
GLUCOSE SERPL-MCNC: 161 MG/DL (ref 65–140)
GLUCOSE SERPL-MCNC: 187 MG/DL (ref 65–140)
HCT VFR BLD AUTO: 33.4 % (ref 36.5–49.3)
HGB BLD-MCNC: 9.9 G/DL (ref 12–17)
LYMPHOCYTES # BLD AUTO: 0.62 THOUSAND/UL (ref 0.6–4.47)
LYMPHOCYTES # BLD AUTO: 13 % (ref 14–44)
MAGNESIUM SERPL-MCNC: 1.9 MG/DL (ref 1.6–2.6)
MCH RBC QN AUTO: 24.9 PG (ref 26.8–34.3)
MCHC RBC AUTO-ENTMCNC: 29.6 G/DL (ref 31.4–37.4)
MCV RBC AUTO: 84 FL (ref 82–98)
MONOCYTES # BLD AUTO: 0.47 THOUSAND/UL (ref 0–1.22)
MONOCYTES NFR BLD: 10 % (ref 4–12)
NEUTROPHILS # BLD MANUAL: 3.65 THOUSAND/UL (ref 1.85–7.62)
NEUTS SEG NFR BLD AUTO: 77 % (ref 43–75)
PHOSPHATE SERPL-MCNC: 4.1 MG/DL (ref 2.3–4.1)
PLATELET # BLD AUTO: 122 THOUSANDS/UL (ref 149–390)
PLATELET BLD QL SMEAR: ABNORMAL
PMV BLD AUTO: 9.9 FL (ref 8.9–12.7)
POTASSIUM SERPL-SCNC: 4.9 MMOL/L (ref 3.5–5.3)
PROT SERPL-MCNC: 6.5 G/DL (ref 6.4–8.2)
RBC # BLD AUTO: 3.98 MILLION/UL (ref 3.88–5.62)
SODIUM SERPL-SCNC: 139 MMOL/L (ref 136–145)
TOTAL CELLS COUNTED SPEC: 100
WBC # BLD AUTO: 4.74 THOUSAND/UL (ref 4.31–10.16)

## 2018-03-08 PROCEDURE — 84100 ASSAY OF PHOSPHORUS: CPT | Performed by: PHYSICIAN ASSISTANT

## 2018-03-08 PROCEDURE — 82948 REAGENT STRIP/BLOOD GLUCOSE: CPT

## 2018-03-08 PROCEDURE — 83735 ASSAY OF MAGNESIUM: CPT | Performed by: PHYSICIAN ASSISTANT

## 2018-03-08 PROCEDURE — 97116 GAIT TRAINING THERAPY: CPT

## 2018-03-08 PROCEDURE — 85007 BL SMEAR W/DIFF WBC COUNT: CPT | Performed by: PHYSICIAN ASSISTANT

## 2018-03-08 PROCEDURE — 99232 SBSQ HOSP IP/OBS MODERATE 35: CPT | Performed by: PHYSICIAN ASSISTANT

## 2018-03-08 PROCEDURE — 97110 THERAPEUTIC EXERCISES: CPT

## 2018-03-08 PROCEDURE — 94760 N-INVAS EAR/PLS OXIMETRY 1: CPT

## 2018-03-08 PROCEDURE — 85027 COMPLETE CBC AUTOMATED: CPT | Performed by: PHYSICIAN ASSISTANT

## 2018-03-08 PROCEDURE — 71045 X-RAY EXAM CHEST 1 VIEW: CPT

## 2018-03-08 PROCEDURE — 97530 THERAPEUTIC ACTIVITIES: CPT

## 2018-03-08 PROCEDURE — 80053 COMPREHEN METABOLIC PANEL: CPT | Performed by: PHYSICIAN ASSISTANT

## 2018-03-08 PROCEDURE — 94762 N-INVAS EAR/PLS OXIMTRY CONT: CPT

## 2018-03-08 RX ORDER — ALPRAZOLAM 0.25 MG/1
0.25 TABLET ORAL 2 TIMES DAILY PRN
Status: DISCONTINUED | OUTPATIENT
Start: 2018-03-08 | End: 2018-03-12 | Stop reason: HOSPADM

## 2018-03-08 RX ADMIN — METOPROLOL TARTRATE 25 MG: 25 TABLET ORAL at 17:45

## 2018-03-08 RX ADMIN — ALPRAZOLAM 0.25 MG: 0.25 TABLET ORAL at 22:06

## 2018-03-08 RX ADMIN — INSULIN LISPRO 1 UNITS: 100 INJECTION, SOLUTION INTRAVENOUS; SUBCUTANEOUS at 11:44

## 2018-03-08 RX ADMIN — ACETAMINOPHEN 650 MG: 325 TABLET, FILM COATED ORAL at 01:56

## 2018-03-08 RX ADMIN — TAMSULOSIN HYDROCHLORIDE 0.4 MG: 0.4 CAPSULE ORAL at 17:45

## 2018-03-08 RX ADMIN — Medication 1 CAPSULE: at 17:45

## 2018-03-08 RX ADMIN — LEVOTHYROXINE SODIUM 150 MCG: 150 TABLET ORAL at 05:08

## 2018-03-08 RX ADMIN — ATORVASTATIN CALCIUM 40 MG: 40 TABLET, FILM COATED ORAL at 17:45

## 2018-03-08 RX ADMIN — ASPIRIN 81 MG 81 MG: 81 TABLET ORAL at 10:18

## 2018-03-08 RX ADMIN — INSULIN LISPRO 1 UNITS: 100 INJECTION, SOLUTION INTRAVENOUS; SUBCUTANEOUS at 16:01

## 2018-03-08 RX ADMIN — FLUTICASONE PROPIONATE 1 SPRAY: 50 SPRAY, METERED NASAL at 10:19

## 2018-03-08 RX ADMIN — HEPARIN SODIUM 5000 UNITS: 5000 INJECTION, SOLUTION INTRAVENOUS; SUBCUTANEOUS at 22:06

## 2018-03-08 RX ADMIN — METOPROLOL TARTRATE 25 MG: 25 TABLET ORAL at 10:18

## 2018-03-08 RX ADMIN — MELATONIN 3 MG: 3 TAB ORAL at 22:06

## 2018-03-08 RX ADMIN — TRAZODONE HYDROCHLORIDE 50 MG: 50 TABLET ORAL at 22:06

## 2018-03-08 RX ADMIN — HEPARIN SODIUM 5000 UNITS: 5000 INJECTION, SOLUTION INTRAVENOUS; SUBCUTANEOUS at 14:32

## 2018-03-08 RX ADMIN — FUROSEMIDE 40 MG: 10 INJECTION, SOLUTION INTRAMUSCULAR; INTRAVENOUS at 10:18

## 2018-03-08 RX ADMIN — FUROSEMIDE 40 MG: 10 INJECTION, SOLUTION INTRAMUSCULAR; INTRAVENOUS at 16:01

## 2018-03-08 RX ADMIN — BUDESONIDE AND FORMOTEROL FUMARATE DIHYDRATE 2 PUFF: 160; 4.5 AEROSOL RESPIRATORY (INHALATION) at 21:26

## 2018-03-08 RX ADMIN — HEPARIN SODIUM 5000 UNITS: 5000 INJECTION, SOLUTION INTRAVENOUS; SUBCUTANEOUS at 05:08

## 2018-03-08 RX ADMIN — BUDESONIDE AND FORMOTEROL FUMARATE DIHYDRATE 2 PUFF: 160; 4.5 AEROSOL RESPIRATORY (INHALATION) at 10:19

## 2018-03-08 NOTE — PROGRESS NOTES
Progress Note - Nephrology   Magali Vázquez 80 y o  male MRN: [id number]  Unit/Bed#: 411-01 Encounter: [id number]    A/P:  1  RADAMES on top of CKD               Patient's cause of RADAMES a little difficult to ascertain at this time  He has mild hydronephrosis and we cannot blame the entirety of RADAMES on this finding, there may be a small component of reduced renal function due to this anatomic issue  His history would suggest a volume depleted state with n-v-d, however, on objective physical exam, radiologic and by BNP he appears slightly volume overloaded at this time  I would continue to address possible  infection (cystitis), ceftriaxone a safer and more appropriate medication  Continue with supportive care, I will check urine studies at this time for completeness sake of work up  3/1: Renal function is a little worse this AM, I will give 1L of NS today with gentle infusion rate of 75 ml/hr  Patient's FeNa is <1%, given severe cardiac valvulopathy, he will likely benefit from a little more volume expansion given overall clinical findings and probable positive hemodynamic response  3/2: Patient with minimal response to IVF volume expansion  He could tolerate more volume expansion and I will offer another 1L of NS today  Continue to closely monitor the patient's volume status as he appears to have a small ability to tolerate too much volume expansion as it will lead into CHF               3/3: Receiving IVF for probable volume depletion  Would hold IVF if he is drinking due to history of CHF  Check labs in the morning              3/4: Renal function about the same - follow daily labs              3/5: little change clinically  Receiving diuretic and has mild edema              3/6: renal function stable but above baseline  Weights have risen    Will order a dose of furosemide today              3/7: Renal function stable, however would benefit from increased diuresis              3/8: Diuresing  With a decline in weight and improved aeration and no significant change in renal function  2  CKD III with baseline Cr ~1 4 mg/dL  3  Hypernatremia -resolved  4  HTN + CKD + CHF -patient appears compensated at this time  5  Cystitis               Follow up cultures, continue with ceftriaxone  Culture remains pending  6  Nephrolithiasis with mild right sided hydroureteronephrosis              No intervention from Urology at this time, we will continue to treat medically  7  Severe pulmonary HTN  8  Mod to severe mitral regurgitation  9  Hyperkalemia" recheck and place potassium restriction in diet      3/8: Potassium WNL      Follow up reason for today's visit: RADAMES/CKD    Acute on chronic systolic CHF (congestive heart failure) ([facility] [address] )    Patient Active Problem List   Diagnosis    Kidney stone    CKD (chronic kidney disease), stage III    [facility]) [facility])    Hyperlipidemia    Essential hypertension    Type 2 diabetes mellitus (HCC)    Acute on chronic systolic CHF (congestive heart failure) (HCC)    Elevated troponin I level    Acute kidney injury (HCC)    Ureterolithiasis    Thrombocytopenia (HCC)    Leukopenia    Non-ST elevation myocardial infarction (NSTEMI), type 2 (HCC)    Severe aortic stenosis    MRSA colonization    Elevated TSH    Acute cystitis    Pulmonary hypertension    Mitral regurgitation    Aortic regurgitation    Hypothyroidism         Subjective:     Patient without acute events overnight  Objective:     Vitals: Blood pressure 121/70, pulse 64, temperature (!) 96 4 °F (35 8 °C), temperature source Temporal, resp  rate 20, height 5' 5" (1 651 m), weight 81 8 kg (180 lb 5 4 oz), SpO2 99 %  ,Body mass index is 30 01 kg/m²      Weight (last 2 days)     Date/Time   Weight    03/08/18 0539  81 8 (180 34)    03/07/18 0600  82 1 (181)    03/06/18 0558  82 4 (181 66)                Intake/Output Summary (Last 24 hours) at 03/08/18 0930  Last data filed at 03/08/18 0819   Gross per 24 hour   Intake              580 ml   Output              226 ml   Net              354 ml     I/O last 3 completed shifts: In: 700 [P O :700]  Out: 256 [Urine:256]         Physical Exam: /70 (BP Location: Left arm)   Pulse 64   Temp (!) 96 4 °F (35 8 °C) (Temporal)   Resp 20   Ht 5' 5" (1 651 m)   Wt 81 8 kg (180 lb 5 4 oz)   SpO2 99%   BMI 30 01 kg/m²     General Appearance:    Alert, cooperative, no distress, appears stated age   Head:    Normocephalic, without obvious abnormality, atraumatic   Eyes:    Conjunctiva/corneas clear   Ears:    Normal external ears   Nose:   Nares normal, septum midline, mucosa normal, no drainage    or sinus tenderness   Throat:   Lips, mucosa, and tongue normal; teeth and gums normal   Neck:   Supple   Back:     Symmetric, no curvature, ROM normal, no CVA tenderness   Lungs:     Has crackes 1/3 way up   Chest wall:    No tenderness or deformity   Heart:    Regular rate and rhythm, S1 and S2 normal, no murmur, rub   or gallop   Abdomen:     Soft, non-tender, bowel sounds active   Extremities:   Extremities normal, atraumatic, no cyanosis, +2 edema bilaterally 2+ edema   Skin:   Skin color, texture, turgor normal, no rashes or lesions   Lymph nodes:   Cervical normal   Neurologic:   CNII-XII intact            Lab, Imaging and other studies: I have personally reviewed pertinent labs    CBC:   Lab Results   Component Value Date    WBC 4 74 03/08/2018    HGB 9 9 (L) 03/08/2018    HCT 33 4 (L) 03/08/2018    MCV 84 03/08/2018     (L) 03/08/2018    MCH 24 9 (L) 03/08/2018    MCHC 29 6 (L) 03/08/2018    RDW 18 9 (H) 03/08/2018    MPV 9 9 03/08/2018     CMP:   Lab Results   Component Value Date     03/08/2018    K 4 9 03/08/2018     03/08/2018    CO2 26 03/08/2018    ANIONGAP 9 03/08/2018    BUN 50 (H) 03/08/2018    CREATININE 1 66 (H) 03/08/2018    GLUCOSE 103 03/08/2018    CALCIUM 8 1 (L) 03/08/2018    AST 18 03/08/2018    ALT 20 03/08/2018    ALKPHOS 88 03/08/2018    PROT 6 5 03/08/2018    BILITOT 0 60 03/08/2018    EGFR 36 03/08/2018         Results from last 7 days  Lab Units 03/08/18  0515 03/07/18  0456 03/06/18  0443 03/05/18  0436  03/04/18  0606   SODIUM mmol/L 139 138 138 138  < > 138   POTASSIUM mmol/L 4 9 5 0 5 1 5 3  < > 5 8*   CHLORIDE mmol/L 104 104 104 105  < > 104   CO2 mmol/L 26 25 28 24  < > 25   BUN mg/dL 50* 55* 55* 53*  < > 52*   CREATININE mg/dL 1 66* 1 82* 1 87* 1 84*  < > 1 83*   CALCIUM mg/dL 8 1* 8 3 8 6 8 2*  < > 8 3   TOTAL PROTEIN g/dL 6 5  --   --  6 5  --  6 7   BILIRUBIN TOTAL mg/dL 0 60  --   --  0 40  --  0 50   ALK PHOS U/L 88  --   --  102  --  101   ALT U/L 20  --   --  21  --  21   AST U/L 18  --   --  22  --  22   GLUCOSE RANDOM mg/dL 103 111 118 108  < > 115   < > = values in this interval not displayed  Phosphorus:   Lab Results   Component Value Date    PHOS 4 1 03/08/2018     Magnesium:   Lab Results   Component Value Date    MG 1 9 03/08/2018     Urinalysis:   No results found for: Tedra Ely, SPECGRAV, PHUR, LEUKOCYTESUR, NITRITE, PROTEINUA, GLUCOSEU, KETONESU, BILIRUBINUR, BLOODU  Ionized Calcium: No results found for: CAION  Coagulation:   No results found for: PT, INR, APTT  Troponin:   No results found for: TROPONINI  ABG: No results found for: PHART, BBU7AHZ, PO2ART, PQE1CMU, W1YGQRQJ, BEART, SOURCE  Radiology review:     IMAGING  Procedure: Ct Abdomen Pelvis Wo Contrast    Result Date: 2/27/2018  Narrative: CT ABDOMEN AND PELVIS WITHOUT IV CONTRAST INDICATION: diarhea abdm distention  History taken directly from the electronic ordering system  COMPARISON: None  TECHNIQUE:  CT examination of the abdomen and pelvis was performed without intravenous contrast   Axial, sagittal, and coronal 2D reformatted images were created from the source data and submitted for interpretation  Radiation dose length product (DLP) for this visit:  721 9 mGy-cm     This examination, like all CT scans performed in the [facility], was performed utilizing techniques to minimize radiation dose exposure, including the use of iterative reconstruction and automated exposure control  Enteric contrast was not administered  FINDINGS: ABDOMEN LOWER CHEST:  Bilateral pleural effusions, right greater than left  Atelectatic versus consolidative changes at the left base  LIVER/BILIARY TREE:  Within limitations of noncontrast, no focal lesion  GALLBLADDER:  No calcified gallstones  No pericholecystic inflammatory change  SPLEEN:  Unremarkable  PANCREAS:  Much of the pancreas is atrophic  ADRENAL GLANDS:  Unremarkable  KIDNEYS/URETERS:  Simple appearing left renal cysts  6 mm catheter in the distal left ureter just proximal to the UVJ  Mild left hydroureter without substantial hydronephrosis  Punctate calculus in the superior pole of the left kidney  STOMACH AND BOWEL:  No evidence for bowel obstruction  Diverticulosis  No clear evidence for diverticulitis or colitis  APPENDIX:  No findings to suggest appendicitis  ABDOMINOPELVIC CAVITY:  Mild abdominal ascites  Scattered lymph nodes in the retroperitoneum which are nonspecific  VESSELS:  Atherosclerotic changes are present  No evidence of aneurysm  PELVIS REPRODUCTIVE ORGANS:  Prostatomegaly URINARY BLADDER:  Bladder is slightly thick-walled  Correlate with UA for cystitis in the possibility of chronic bladder outlet obstruction  ABDOMINAL WALL/INGUINAL REGIONS:  Anasarca  OSSEOUS STRUCTURES:  No acute fracture or destructive osseous lesion  Impression: 6 mm calculus in the distal left ureter just proximal to the UVJ resulting in mild left-sided hydroureter but no significant hydronephrosis  Bilateral pleural effusions, right greater than left with atelectatic versus consolidative change at the left base  Thick-walled bladder  Could reflect cystitis versus sequela chronic outlet obstruction  Mild abdominal ascites   Workstation performed: [id number]     Procedure: Xr Chest 1 View Portable    Result Date: 2/27/2018  Narrative: CHEST INDICATION:  Shortness of breath  diarrhea COMPARISON:  October 4, 2017  EXAM PERFORMED/VIEWS:  XR CHEST PORTABLE FINDINGS: Heart enlarged  ICD present  Pulmonary vessels prominent and indistinct  Opacification in the lower left hemithorax, probably a combination of pleural effusion and atelectasis or consolidation in the left lower lobe  Right lung clear  No pneumothorax  Osseous structures appear within normal limits for patient age  Impression: 1  Cardiomegaly and pulmonary vascular congestion  2   Probable small left pleural effusion and atelectasis or consolidation in the left lower lobe  Workstation performed: [id number]     Procedure: Us Kidney And Bladder    Result Date: 2/28/2018  Narrative: RENAL ULTRASOUND INDICATION: RENAL FAILURE, ACUTE RENAL FAILURE, CHRONIC arf, crf COMPARISON: CT 2/27/2018 TECHNIQUE:   Ultrasound of the retroperitoneum was performed with a curvilinear transducer utilizing volumetric sweeps and still imaging techniques  FINDINGS: KIDNEYS: Symmetric and normal size  Right kidney:  9 6 cm  Left kidney:  9 8 cm  Right kidney The renal parenchyma is diffusely echogenic consistent with medical renal disease  No suspicious masses detected  No hydronephrosis  No shadowing calculi  No perinephric fluid collections  Left kidney The renal parenchyma is diffusely echogenic consistent with medical renal disease  No suspicious masses detected  Left renal simple cysts  No hydronephrosis  No shadowing calculi  No perinephric fluid collections  URETERS: Nonvisualized  BLADDER: Diffuse bladder wall thickening could relate to cystitis or outlet obstruction changes, correlate clinically  4 7 x 2 9 x 3 6 cm prostate protrudes into the base of the bladder  Small amount of ascites noted in the left upper abdominal quadrant as on CT  Impression: 1  Bilateral medical renal disease   2   No hydronephrosis  3   Left renal cysts  4   Diffuse bladder wall thickening could relate to cystitis or outlet obstruction changes, correlate clinically  4 7 x 2 9 x 3 6 cm prostate protrudes into the base of the bladder   Workstation performed: [id number]       Current Facility-Administered Medications   Medication Dose Route Frequency    acetaminophen (TYLENOL) tablet 650 mg  650 mg Oral Q6H PRN    aspirin chewable tablet 81 mg  81 mg Oral Daily    atorvastatin (LIPITOR) tablet 40 mg  40 mg Oral Daily With Dinner    b complex-vitamin C-folic acid (NEPHROCAPS) capsule 1 capsule  1 capsule Oral Daily With Dinner    budesonide-formoterol (SYMBICORT) 160-4 5 mcg/act inhaler 2 puff  2 puff Inhalation BID    fluticasone (FLONASE) 50 mcg/act nasal spray 1 spray  1 spray Nasal Daily    furosemide (LASIX) injection 40 mg  40 mg Intravenous BID (diuretic)    heparin (porcine) subcutaneous injection 5,000 Units  5,000 Units Subcutaneous Q8H [address]    insulin lispro (HumaLOG) 100 units/mL subcutaneous injection 1-6 Units  1-6 Units Subcutaneous TID AC    insulin lispro (HumaLOG) 100 units/mL subcutaneous injection 1-6 Units  1-6 Units Subcutaneous HS    levalbuterol (XOPENEX) inhalation solution 1 25 mg  1 25 mg Nebulization Q6H PRN    levothyroxine tablet 150 mcg  150 mcg Oral Early Morning    melatonin tablet 3 mg  3 mg Oral HS    metoprolol tartrate (LOPRESSOR) tablet 25 mg  25 mg Oral BID    ondansetron (ZOFRAN) injection 4 mg  4 mg Intravenous Q6H PRN    sodium chloride 0 9 % inhalation solution 3 mL  3 mL Nebulization Q6H PRN    tamsulosin (FLOMAX) capsule 0 4 mg  0 4 mg Oral Daily With Dinner    traZODone (DESYREL) tablet 50 mg  50 mg Oral HS     Medications Discontinued During This Encounter   Medication Reason    aspirin 81 MG tablet     metoprolol tartrate (LOPRESSOR) 50 mg tablet Dose adjustment    simvastatin (ZOCOR) 40 mg tablet Dose adjustment    albuterol (PROVENTIL HFA,VENTOLIN HFA) 90 mcg/act inhaler     allopurinol (ZYLOPRIM) 100 mg tablet     Calcium Carbonate (CALCIUM 600 HIGH POTENCY PO)     DULoxetine (CYMBALTA) 30 mg delayed release capsule     fluticasone-salmeterol (ADVAIR) 250-50 mcg/dose inhaler     furosemide (LASIX) 40 mg tablet     glipiZIDE (GLUCOTROL) 5 mg tablet     indomethacin (INDOCIN) 50 mg capsule     IRON PO     Linagliptin (TRADJENTA) 5 MG TABS     losartan (COZAAR) 50 mg tablet     metolazone (ZAROXOLYN) 5 mg tablet     Misc Natural Products (TART CHERRY ADVANCED PO)     Nutritional Supplements (VITAMIN D BOOSTER PO)     oxyCODONE-acetaminophen (PERCOCET) 5-325 mg per tablet     potassium chloride (MICRO-K) 10 MEQ CR capsule     POTASSIUM GLUCONATE PO     vitamin E, tocopherol, 400 units capsule     sodium chloride 0 9 % infusion     bumetanide (BUMEX) tablet 1 mg     finasteride (PROSCAR) tablet 5 mg     hydrOXYzine HCL (ATARAX) tablet 25 mg     zolpidem (AMBIEN) tablet 5 mg     fluticasone (FLONASE) 50 mcg/act nasal spray 2 spray     acetaminophen (TYLENOL) tablet 650 mg     aspirin tablet 325 mg     pantoprazole (PROTONIX) EC tablet 20 mg     pravastatin (PRAVACHOL) tablet 40 mg     cefTRIAXone (ROCEPHIN) IVPB (premix) 1,000 mg     levothyroxine tablet 125 mcg     sodium chloride 0 9 % infusion     cefTRIAXone (ROCEPHIN) IVPB (premix) 1,000 mg     hydrOXYzine HCL (ATARAX) tablet 25 mg     furosemide (LASIX) injection 40 mg        Selene Alfredo MD

## 2018-03-08 NOTE — PROGRESS NOTES
Progress Note - Cardiology   Libia Bravo 80 y o  male MRN: [id number]  Unit/Bed#: 411-01 Encounter: [id number]  03/08/18  11:11 AM        Subjective/Objective   Chief Complaint:   Chief Complaint   Patient presents with    Diarrhea     N/V/D since last night      Subjective: Patient denies any complaints except for thirst   Specifically denies chest pains or shortness of breath    Objective: Comfortable , no distress at the time of exam      Patient Active Problem List   Diagnosis    Kidney stone    CKD (chronic kidney disease), stage III    COPD [facility]chronic obstructive pulmonary disease) ([facility] [address] )    Hyperlipidemia    Essential hypertension    [facility]    Acute on chronic systolic CHF (congestive heart failure) ([facility] )    Elevated troponin I level    [facility] kidney injury ([facility] )    Ureterolithiasis    Thrombocytopenia (HCC)    Leukopenia    Non-ST elevation myocardial infarction (NSTEMI), type 2 (HCC)    Severe aortic stenosis    MRSA colonization    Elevated TSH    Acute cystitis    Pulmonary hypertension    Mitral regurgitation    Aortic regurgitation    Hypothyroidism       Vitals: /70 (BP Location: Left arm)   Pulse 64   Temp (!) 96 4 °F (35 8 °C) (Temporal)   Resp 20   Ht 5' 5" (1 651 m)   Wt 81 8 kg (180 lb 5 4 oz)   SpO2 99%   BMI 30 01 kg/m²     I/O this shift:  In: 120 [P O :120]  Out: 75 [Urine:75]  Wt Readings from Last 3 Encounters:   03/08/18 81 8 kg (180 lb 5 4 oz)   03/16/17 80 7 kg (178 lb)       Intake/Output Summary (Last 24 hours) at 03/08/18 1111  Last data filed at 03/08/18 1011   Gross per 24 hour   Intake              460 ml   Output              251 ml   Net              209 ml     I/O last 3 completed shifts:   In: 700 [P O :700]  Out: 256 [Urine:256]    Invasive Devices     Peripheral Intravenous Line            Peripheral IV 03/05/18 Left;Lateral Wrist 2 days                  Physical Exam:  GEN: Libia Bravo appears well, alert and oriented x 3, pleasant and cooperative   HEENT: pupils equal, round, and reactive to light; extraocular muscles intact  NECK: supple, no carotid bruits or JVD  HEART: regular rhythm, normal S1 and S2, no murmurs, clicks, gallops or rubs   LUNGS: clear to auscultation bilaterally; no wheezes,or rhonchi , few rales present scattered  ABDOMEN/GI: normal bowel sounds, soft, no tenderness, no distention  EXTREMITIES/Musculoskeltal: peripheral pulses normal; no clubbing, cyanosis, or edema  NEURO: no focal findings   SKIN: normal without suspicious lesions on exposed skin            Lab Results:     Results from last 7 days  Lab Units 03/04/18  0606   TROPONIN I ng/mL 0 03     Results from last 7 days  Lab Units 03/08/18  0515 03/07/18  0456 03/06/18  0443   WBC Thousand/uL 4 74 4 74 4 76   HEMOGLOBIN g/dL 9 9* 10 0* 10 6*   HEMATOCRIT % 33 4* 33 8* 35 5*   PLATELETS Thousands/uL 122* 131* 125*           Results from last 7 days  Lab Units 03/08/18  0515 03/07/18  0456 03/06/18  0443 03/05/18  0436  03/04/18  0606   SODIUM mmol/L 139 138 138 138  < > 138   POTASSIUM mmol/L 4 9 5 0 5 1 5 3  < > 5 8*   CHLORIDE mmol/L 104 104 104 105  < > 104   CO2 mmol/L 26 25 28 24  < > 25   BUN mg/dL 50* 55* 55* 53*  < > 52*   CREATININE mg/dL 1 66* 1 82* 1 87* 1 84*  < > 1 83*   CALCIUM mg/dL 8 1* 8 3 8 6 8 2*  < > 8 3   TOTAL PROTEIN g/dL 6 5  --   --  6 5  --  6 7   BILIRUBIN TOTAL mg/dL 0 60  --   --  0 40  --  0 50   ALK PHOS U/L 88  --   --  102  --  101   ALT U/L 20  --   --  21  --  21   AST U/L 18  --   --  22  --  22   GLUCOSE RANDOM mg/dL 103 111 118 108  < > 115   < > = values in this interval not displayed  Results from last 7 days  Lab Units 03/03/18  0416   INR  1 29*       Imaging: I have personally reviewed pertinent reports      EKG:  No events    Scheduled Meds:    Current Facility-Administered Medications:  acetaminophen 650 mg Oral Q6H PRN Phuong Cola, DO   aspirin 81 mg Oral Daily Phuong Cola, DO atorvastatin 40 mg Oral Daily With Clarence Verdin, DO   b complex-vitamin C-folic acid 1 capsule Oral Daily With Dinner Sherman Vogel PA-C   budesonide-formoterol 2 puff Inhalation BID Sherman Vogel PA-C   fluticasone 1 spray Nasal Daily [place] Peek, DO   furosemide 40 mg Intravenous BID (diuretic) [place] Peek, DO   heparin (porcine) 5,000 Units Subcutaneous Q8H [address] Sherman Vogel PA-C   insulin lispro 1-6 Units Subcutaneous TID AC Sherman Vogel PA-C   insulin lispro 1-6 Units Subcutaneous HS Sherman Vogel PA-C   levalbuterol 1 25 mg Nebulization Q6H PRN [place] Peek, DO   levothyroxine 150 mcg Oral Early Morning Jerry Ochoa, DO   melatonin 3 mg Oral HS [place] Peek, DO   metoprolol tartrate 25 mg Oral BID [place] Peek, DO   ondansetron 4 mg Intravenous Q6H PRN Sherman Vogel PA-C   sodium chloride 3 mL Nebulization Q6H PRN [place] Peek, DO   tamsulosin 0 4 mg Oral Daily With Dinner Sherman Vogel PA-C   traZODone 50 mg Oral HS Sherman Vogel PA-C     Continuous Infusions:       VTE Pharmacologic Prophylaxis: Heparin  VTE Mechanical Prophylaxis: sequential compression device    80year-old with severe aortic stenosis, ischemic cardiomyopathy, status post AICD, admitted with acute kidney injury on chronic kidney disease with obstructive uropathy  Overall renal function has remained stable-creatinine seems to have plateaued around 3 7-4 5  Most recent chest x-ray from this morning demonstrated increased pleural effusions and pulmonary edema  He was on Lasix 40 mg IV for the last few days, currently increased to 40 mg IV twice daily  While his input output charting is not accurate, his weight is down by 1 kg  Renal function is also improving    Overall indicative of improving volume status     Plan:     Acute on chronic diastolic and systolic congestive heart failure:   input output charting is not accurate, [place] catheter was falling off, recent UTI, by weights and renal function, overall volume status is improving, continue the same     Severe aortic stenosis:  TAVR evaluation was recommended  At this point I am not sure if he is a good candidate considering his debility, renal function and dementia and prolonged hospitalizations and nursing home stays  But this could always be readdressed as an outpatient through his primary cardiology group-at [facility]      Ischemic cardiomyopathy:  Continue beta-blocker  Eventually, Ace inhibitor could be considered     I will continue to follow the patient with you  Overall, considering his chronic kidney disease, debilitated status, dementia, severe aortic stenosis, ischemic cardiomyopathy-his prognosis is poor  Counseling / Coordination of Care  Total time spent 20 minutes including teaching and family updates  More than 50% was spent counseling pt and family

## 2018-03-08 NOTE — PROGRESS NOTES
At 1600 patietn was SOB, restless in the bed  Pulse ox 3L 84%  Oxygen increased to 5L  Lungs with rales Lside and diminshed on the right side  Spkoe with Dr Marilyn Sullivan  Additional 40mg of IV lasix given  Breathing treatment given  Patient incontinent of large amount of urine  Pulse 100%  Appears more comfortable

## 2018-03-08 NOTE — TELEPHONE ENCOUNTER
Spoke with pt's dtr, Fabian Cheatham, re follow up appt for pt  Fabian Cheatham states pt remains inpatient at [place]  Will follow  Asked Fabian Cheatham to contact office when pt is discharged from [place]  ----- Message from Sophia Ny MD sent at 2/28/2018 10:33 AM EST -----  F/U with Melissa in 4-6 weeks with non con CT a/p  Preferably, his granddaughter/POA would accompany him to visit

## 2018-03-08 NOTE — OCCUPATIONAL THERAPY NOTE
OT Note     03/08/18 1314   General   Missed Treatment Reason Nursing request   Assessment   Assessment Presents supine and asleep  Nsg requests defer tx session secondary to allow pt  to sleep     Recommendation   Recommendation (Continue OT services )

## 2018-03-08 NOTE — PROGRESS NOTES
[facility] Progress Note  Patient: Anna Bennett 80 y o  male   MRN: [id number]  PCP: America Lomas MD  Unit/Bed#: 715-83 Encounter: [id number]  Date Of Visit: 03/08/18    Assessment:    Principal Problem:    Acute on chronic systolic CHF (congestive heart failure) ([place] [address] )  Active Problems:    Kidney stone    CKD (chronic kidney disease), stage III    COPD (chronic obstructive pulmonary disease) ([place] [address] )    Hyperlipidemia    Essential hypertension    Type 2 diabetes mellitus (HCC)    Elevated troponin I level    Acute kidney injury ([place] [address] )    Ureterolithiasis    Thrombocytopenia (HCC)    Leukopenia    Non-ST elevation myocardial infarction (NSTEMI), type 2 ([facility])    Severe aortic stenosis    MRSA colonization    Elevated TSH    Acute cystitis    Pulmonary hypertension    Mitral regurgitation    Aortic regurgitation    Hypothyroidism      Plan:    · Acute on chronic systolic CHF with EF of 36%/WTSZKY aortic stenosis/Mitral regurgitation/Aortic regurgitation/Pulmonary hypertension: I&O's continue to remain inaccurate and [place] catheter fell off multiple times  The patient has had a decrease in weight  Will continue IV lasix 40 mg BID  Appreciate Cardiology's input  · Ureterolithiasis/Acute kidney injury(POA)/Chronic kidney disease stage 3: mild improvement of renal function today  Continue to monitor closely while diuresing  Urinary retention protocol with bladder scans every shift  Appreciate nephrology's input  · Acute cystitis secondary to proteus mirabilis: patient completed full antibiotic course and repeat UA is negative  · Hypothyroidism/Elevated TSH: continue increased dose of levothyroxine 150 mcg PO Qdaily in the early morning  Recheck TSH level in 4-6 weeks  · MRSA colonization: patient completed 5 days of Nasal Bactroban  He will need MRSA de-colonization with Hibiclens after discharge          VTE Pharmacologic Prophylaxis:   Pharmacologic: Heparin  Mechanical VTE Prophylaxis in Place: Yes    Discussions with Specialists or Other Care Team Provider: Nursing, CM, and attending    Education and Discussions with Family / Patient: n/a    Time Spent for Care: 30 minutes  More than 50% of total time spent on counseling and coordination of care as described above  Current Length of Stay: 9 day(s)    Current Patient Status: Inpatient   Certification Statement: The patient will continue to require additional inpatient hospital stay due to continued need for diuresis  Code Status: Level 1 - Full Code      Subjective: The patient has been seen and examined  The patient states he is sleepy  He denies any pain  Objective:     Vitals:   Temp (24hrs), Av 8 °F (36 °C), Min:96 4 °F (35 8 °C), Max:97 2 °F (36 2 °C)    HR:  [63-65] 64  Resp:  [18-24] 20  BP: (121-127)/(60-73) 121/70  SpO2:  [86 %-100 %] 99 %  Body mass index is 30 01 kg/m²  Input and Output Summary (last 24 hours): Intake/Output Summary (Last 24 hours) at 18 1158  Last data filed at 18 1101   Gross per 24 hour   Intake              460 ml   Output              351 ml   Net              109 ml       Physical Exam:     Physical Exam   Constitutional: Vital signs are normal  He appears well-developed and well-nourished  He is active and cooperative  Cardiovascular: Normal rate and regular rhythm  Murmur heard  Pulmonary/Chest: Effort normal  He has no wheezes  He has no rhonchi  He has rales in the right middle field, the right lower field, the left middle field and the left lower field  Abdominal: Soft  Normal appearance and bowel sounds are normal  There is no tenderness  Neurological: He is alert  No cranial nerve deficit  Skin: Skin is warm, dry and intact  Nursing note and vitals reviewed          Additional Data:     Labs:      Results from last 7 days  Lab Units 18  0515 18  0456   WBC Thousand/uL 4 74 4 74   HEMOGLOBIN g/dL 9 9* 10 0*   HEMATOCRIT % 33 4* 33 8*   PLATELETS Thousands/uL 122* 131*   NEUTROS PCT %  --  52   LYMPHS PCT %  --  17   LYMPHO PCT % 13*  --    MONOS PCT %  --  30*   MONO PCT MAN % 10  --    EOS PCT %  --  1   EOSINO PCT MANUAL % 0  --        Results from last 7 days  Lab Units 03/08/18  0515   SODIUM mmol/L 139   POTASSIUM mmol/L 4 9   CHLORIDE mmol/L 104   CO2 mmol/L 26   BUN mg/dL 50*   CREATININE mg/dL 1 66*   CALCIUM mg/dL 8 1*   TOTAL PROTEIN g/dL 6 5   BILIRUBIN TOTAL mg/dL 0 60   ALK PHOS U/L 88   ALT U/L 20   AST U/L 18   GLUCOSE RANDOM mg/dL 103       Results from last 7 days  Lab Units 03/03/18  0416   INR  1 29*       * I Have Reviewed All Lab Data Listed Above  * Additional Pertinent Lab Tests Reviewed:  All Labs Within Last 24 Hours Reviewed    Imaging:    Imaging Reports Reviewed Today Include: chest x-ray  Imaging Personally Reviewed by Myself Includes:  none    Recent Cultures (last 7 days):           Last 24 Hours Medication List:     Current Facility-Administered Medications:  acetaminophen 650 mg Oral Q6H PRN [employer], DO   ALPRAZolam 0 25 mg Oral BID PRN Sergio Larose PA-C   aspirin 81 mg Oral Daily [employer], DO   atorvastatin 40 mg Oral Daily With Clarence Verdin,    b complex-vitamin C-folic acid 1 capsule Oral Daily With Dinner Sherman Vogel PA-C   budesonide-formoterol 2 puff Inhalation BID Sherman Vogel PA-C   fluticasone 1 spray Nasal Daily [employer], DO   furosemide 40 mg Intravenous BID (diuretic) [employer], DO   heparin (porcine) 5,000 Units Subcutaneous Q8H [address] Sherman Vogel PA-C   insulin lispro 1-6 Units Subcutaneous TID AC Sherman Vogel PA-C   insulin lispro 1-6 Units Subcutaneous HS Sherman Vogel PA-C   levalbuterol 1 25 mg Nebulization Q6H PRN [employer], DO   levothyroxine 150 mcg Oral Early Morning Jerry Ochoa, DO   melatonin 3 mg Oral HS Jerry Ochoa, DO   metoprolol tartrate 25 mg Oral BID Jerry Ochoa, DO   ondansetron 4 mg Intravenous Q6H PRN Sherman Vogel PA-C sodium chloride 3 mL Nebulization Q6H PRN Petrona Banegas DO   tamsulosin 0 4 mg Oral Daily With Dinner Sherman Vogel PA-C   traZODone 50 mg Oral HS Sherman Vogel PA-C        Today, Patient Was Seen By: Maame Real PA-C    ** Please Note: This note has been constructed using a voice recognition system   **

## 2018-03-08 NOTE — SOCIAL WORK
Notified Ailyn Workman at [address] that pt is interested in coming there when medically ready   Sent an update to [address] as requested

## 2018-03-09 ENCOUNTER — APPOINTMENT (INPATIENT)
Dept: CT IMAGING | Facility: HOSPITAL | Age: 83
DRG: 693 | End: 2018-03-09
Payer: COMMERCIAL

## 2018-03-09 ENCOUNTER — APPOINTMENT (INPATIENT)
Dept: RADIOLOGY | Facility: HOSPITAL | Age: 83
DRG: 693 | End: 2018-03-09
Payer: COMMERCIAL

## 2018-03-09 LAB
ANION GAP SERPL CALCULATED.3IONS-SCNC: 6 MMOL/L (ref 4–13)
ARTERIAL PATENCY WRIST A: YES
BASE EXCESS BLDA CALC-SCNC: 0 MMOL/L
BASOPHILS # BLD AUTO: 0.01 THOUSANDS/ΜL (ref 0–0.1)
BASOPHILS NFR BLD AUTO: 0 % (ref 0–1)
BILIRUB UR QL STRIP: NEGATIVE
BUN SERPL-MCNC: 47 MG/DL (ref 5–25)
CALCIUM SERPL-MCNC: 8.1 MG/DL (ref 8.3–10.1)
CHLORIDE SERPL-SCNC: 105 MMOL/L (ref 100–108)
CLARITY UR: CLEAR
CO2 SERPL-SCNC: 28 MMOL/L (ref 21–32)
COLOR UR: YELLOW
CREAT SERPL-MCNC: 1.63 MG/DL (ref 0.6–1.3)
EOSINOPHIL # BLD AUTO: 0.05 THOUSAND/ΜL (ref 0–0.61)
EOSINOPHIL NFR BLD AUTO: 1 % (ref 0–6)
ERYTHROCYTE [DISTWIDTH] IN BLOOD BY AUTOMATED COUNT: 18.7 % (ref 11.6–15.1)
GFR SERPL CREATININE-BSD FRML MDRD: 37 ML/MIN/1.73SQ M
GLUCOSE SERPL-MCNC: 117 MG/DL (ref 65–140)
GLUCOSE SERPL-MCNC: 120 MG/DL (ref 65–140)
GLUCOSE SERPL-MCNC: 125 MG/DL (ref 65–140)
GLUCOSE SERPL-MCNC: 133 MG/DL (ref 65–140)
GLUCOSE SERPL-MCNC: 152 MG/DL (ref 65–140)
GLUCOSE UR STRIP-MCNC: NEGATIVE MG/DL
HCO3 BLDA-SCNC: 26.9 MMOL/L (ref 22–28)
HCT VFR BLD AUTO: 33.3 % (ref 36.5–49.3)
HGB BLD-MCNC: 9.9 G/DL (ref 12–17)
HGB UR QL STRIP.AUTO: NEGATIVE
KETONES UR STRIP-MCNC: NEGATIVE MG/DL
LEUKOCYTE ESTERASE UR QL STRIP: NEGATIVE
LYMPHOCYTES # BLD AUTO: 0.6 THOUSANDS/ΜL (ref 0.6–4.47)
LYMPHOCYTES NFR BLD AUTO: 15 % (ref 14–44)
MCH RBC QN AUTO: 25.4 PG (ref 26.8–34.3)
MCHC RBC AUTO-ENTMCNC: 29.7 G/DL (ref 31.4–37.4)
MCV RBC AUTO: 85 FL (ref 82–98)
MONOCYTES # BLD AUTO: 1.27 THOUSAND/ΜL (ref 0.17–1.22)
MONOCYTES NFR BLD AUTO: 32 % (ref 4–12)
NASAL CANNULA: 3.5
NEUTROPHILS # BLD AUTO: 2.09 THOUSANDS/ΜL (ref 1.85–7.62)
NEUTS SEG NFR BLD AUTO: 52 % (ref 43–75)
NITRITE UR QL STRIP: NEGATIVE
O2 CT BLDA-SCNC: 14 ML/DL (ref 16–23)
OXYHGB MFR BLDA: 91.8 % (ref 94–97)
PCO2 BLDA: 54.7 MM HG (ref 36–44)
PH BLDA: 7.31 [PH] (ref 7.35–7.45)
PH UR STRIP.AUTO: 5.5 [PH] (ref 4.5–8)
PLATELET # BLD AUTO: 118 THOUSANDS/UL (ref 149–390)
PMV BLD AUTO: 10.2 FL (ref 8.9–12.7)
PO2 BLDA: 71.4 MM HG (ref 75–129)
POTASSIUM SERPL-SCNC: 5 MMOL/L (ref 3.5–5.3)
PROT UR STRIP-MCNC: NEGATIVE MG/DL
RBC # BLD AUTO: 3.9 MILLION/UL (ref 3.88–5.62)
SODIUM SERPL-SCNC: 139 MMOL/L (ref 136–145)
SP GR UR STRIP.AUTO: 1.01 (ref 1–1.03)
SPECIMEN SOURCE: ABNORMAL
UROBILINOGEN UR QL STRIP.AUTO: 0.2 E.U./DL
WBC # BLD AUTO: 4.02 THOUSAND/UL (ref 4.31–10.16)

## 2018-03-09 PROCEDURE — 71046 X-RAY EXAM CHEST 2 VIEWS: CPT

## 2018-03-09 PROCEDURE — 82805 BLOOD GASES W/O2 SATURATION: CPT | Performed by: PHYSICIAN ASSISTANT

## 2018-03-09 PROCEDURE — 94760 N-INVAS EAR/PLS OXIMETRY 1: CPT

## 2018-03-09 PROCEDURE — 97116 GAIT TRAINING THERAPY: CPT

## 2018-03-09 PROCEDURE — 94762 N-INVAS EAR/PLS OXIMTRY CONT: CPT

## 2018-03-09 PROCEDURE — 81003 URINALYSIS AUTO W/O SCOPE: CPT | Performed by: PHYSICIAN ASSISTANT

## 2018-03-09 PROCEDURE — 87633 RESP VIRUS 12-25 TARGETS: CPT | Performed by: INTERNAL MEDICINE

## 2018-03-09 PROCEDURE — 85025 COMPLETE CBC W/AUTO DIFF WBC: CPT | Performed by: PHYSICIAN ASSISTANT

## 2018-03-09 PROCEDURE — G8996 SWALLOW CURRENT STATUS: HCPCS | Performed by: SPEECH-LANGUAGE PATHOLOGIST

## 2018-03-09 PROCEDURE — G8997 SWALLOW GOAL STATUS: HCPCS | Performed by: SPEECH-LANGUAGE PATHOLOGIST

## 2018-03-09 PROCEDURE — 71250 CT THORAX DX C-: CPT

## 2018-03-09 PROCEDURE — 36600 WITHDRAWAL OF ARTERIAL BLOOD: CPT

## 2018-03-09 PROCEDURE — 80048 BASIC METABOLIC PNL TOTAL CA: CPT | Performed by: PHYSICIAN ASSISTANT

## 2018-03-09 PROCEDURE — 99232 SBSQ HOSP IP/OBS MODERATE 35: CPT | Performed by: PHYSICIAN ASSISTANT

## 2018-03-09 PROCEDURE — 82948 REAGENT STRIP/BLOOD GLUCOSE: CPT

## 2018-03-09 PROCEDURE — 97530 THERAPEUTIC ACTIVITIES: CPT

## 2018-03-09 PROCEDURE — 92610 EVALUATE SWALLOWING FUNCTION: CPT | Performed by: SPEECH-LANGUAGE PATHOLOGIST

## 2018-03-09 RX ADMIN — LEVOTHYROXINE SODIUM 150 MCG: 150 TABLET ORAL at 06:06

## 2018-03-09 RX ADMIN — ASPIRIN 81 MG 81 MG: 81 TABLET ORAL at 08:19

## 2018-03-09 RX ADMIN — FLUTICASONE PROPIONATE 1 SPRAY: 50 SPRAY, METERED NASAL at 08:17

## 2018-03-09 RX ADMIN — BUDESONIDE AND FORMOTEROL FUMARATE DIHYDRATE 2 PUFF: 160; 4.5 AEROSOL RESPIRATORY (INHALATION) at 20:57

## 2018-03-09 RX ADMIN — Medication 1 CAPSULE: at 17:02

## 2018-03-09 RX ADMIN — HEPARIN SODIUM 5000 UNITS: 5000 INJECTION, SOLUTION INTRAVENOUS; SUBCUTANEOUS at 21:00

## 2018-03-09 RX ADMIN — METOPROLOL TARTRATE 25 MG: 25 TABLET ORAL at 17:01

## 2018-03-09 RX ADMIN — MELATONIN 3 MG: 3 TAB ORAL at 21:00

## 2018-03-09 RX ADMIN — TAMSULOSIN HYDROCHLORIDE 0.4 MG: 0.4 CAPSULE ORAL at 16:59

## 2018-03-09 RX ADMIN — HEPARIN SODIUM 5000 UNITS: 5000 INJECTION, SOLUTION INTRAVENOUS; SUBCUTANEOUS at 06:04

## 2018-03-09 RX ADMIN — METOPROLOL TARTRATE 25 MG: 25 TABLET ORAL at 08:19

## 2018-03-09 RX ADMIN — FUROSEMIDE 40 MG: 10 INJECTION, SOLUTION INTRAMUSCULAR; INTRAVENOUS at 08:19

## 2018-03-09 RX ADMIN — FUROSEMIDE 40 MG: 10 INJECTION, SOLUTION INTRAMUSCULAR; INTRAVENOUS at 16:59

## 2018-03-09 RX ADMIN — BUDESONIDE AND FORMOTEROL FUMARATE DIHYDRATE 2 PUFF: 160; 4.5 AEROSOL RESPIRATORY (INHALATION) at 08:17

## 2018-03-09 RX ADMIN — HEPARIN SODIUM 5000 UNITS: 5000 INJECTION, SOLUTION INTRAVENOUS; SUBCUTANEOUS at 13:56

## 2018-03-09 RX ADMIN — TRAZODONE HYDROCHLORIDE 50 MG: 50 TABLET ORAL at 21:00

## 2018-03-09 RX ADMIN — ATORVASTATIN CALCIUM 40 MG: 40 TABLET, FILM COATED ORAL at 17:01

## 2018-03-09 NOTE — PROGRESS NOTES
Progress Note - Geno Arceo 80 y o  male MRN: [id number]  Unit/Bed#: 411-01 Encounter: [id number]      Assessment:  Patient Active Problem List   Diagnosis    Kidney stone    CKD (chronic kidney disease), stage III    [facility]) ([facility])    Hyperlipidemia    Essential hypertension    Type 2 diabetes mellitus (HCC)    Acute on chronic systolic CHF (congestive heart failure) ([facility])    Elevated troponin I level    Acute kidney injury (HCC)    Ureterolithiasis    Thrombocytopenia (HCC)    Leukopenia    Non-ST elevation myocardial infarction (NSTEMI), type 2 (HCC)    Severe aortic stenosis    MRSA colonization    Elevated TSH    Acute cystitis    Pulmonary hypertension    Mitral regurgitation    Aortic regurgitation    Hypothyroidism         Plan:  1) Acute on chronic systolic CHF with EF of 00% / Severe aortic stenosis / Mitral regurgitation/Aortic regurgitation / Pulmonary hypertension: I&O's are inaccurate due to ill fitting catheter  Weight continues to decline  Will continue IV lasix 40 mg BID  2) Confusion / lethargy: will check ABG, repeat chest xray  Will repeat UA/urine culture  Continue to monitor  3) Ureterolithiasis / Acute kidney injury (POA) / Chronic kidney disease stage 3: mild improvement of renal function today  Continue to monitor BMP closely while diuresing  Urinary retention protocol, bladder scans  Appreciate nephrology's input  Repeat CT abdomen and pelvis per urology  4) Acute cystitis secondary to proteus mirabilis: patient completed full antibiotic course  Will repeat UA / culture today  5) Hypothyroidism / Elevated TSH: will continue increased dose of levothyroxine 150 mcg PO Qdaily in the early morning  Recheck TSH level in 4-6 weeks  6) MRSA colonization: patient completed 5 days of Nasal Bactroban  He will need MRSA de-colonization with Hibiclens after discharge       Dispo: possible STR vs  Hospice, depending on clinical improvement  Still requiring inpatient care  Subjective:   Patient seen and examined  He is not a good historian  Responds with "Eh" for all questioning  Will not open eyes  Will not sit forward without help  Objective:   Vitals: Blood pressure 131/71, pulse 68, temperature (!) 96 9 °F (36 1 °C), temperature source Temporal, resp  rate 18, height 5' 5" (1 651 m), weight 78 1 kg (172 lb 2 9 oz), SpO2 100 %  ,Body mass index is 28 65 kg/m²  Weight (last 2 days)     Date/Time   Weight    03/09/18 0600  78 1 (172 18)    03/08/18 0539  81 8 (180 34)    03/07/18 0600  82 1 (181)              Intake/Output Summary (Last 24 hours) at 03/09/18 1146  Last data filed at 03/09/18 0837   Gross per 24 hour   Intake              960 ml   Output              250 ml   Net              710 ml       Physical Exam:   General:  NAD, awake, alert, oriented x 3  HEENT:  NC/AT, MM  Neck:  Supple, No JVD  CV:  + S1, +S2, RRR  Pulm:  Lung fields are decreased at the bilateral bases with rales at the left base  Abd:  Soft  Some guarding  BS active x 4  Ext:  3+ pitting edema of the BLE       Scheduled Meds:  Current Facility-Administered Medications:  acetaminophen 650 mg Oral Q6H PRN [employer], DO   ALPRAZolam 0 25 mg Oral BID PRN Joshua Anders PA-C   aspirin 81 mg Oral Daily [employer], DO   atorvastatin 40 mg Oral Daily With Clarence Verdin, DO   b complex-vitamin C-folic acid 1 capsule Oral Daily With Dinner Sherman Vogel PA-C   budesonide-formoterol 2 puff Inhalation BID Sherman Vogel PA-C   fluticasone 1 spray Nasal Daily [employer], DO   furosemide 40 mg Intravenous BID (diuretic) [employer], DO   heparin (porcine) 5,000 Units Subcutaneous Q8H [address] Sherman Vogel PA-C   insulin lispro 1-6 Units Subcutaneous TID AC Sherman Vogel PA-C   insulin lispro 1-6 Units Subcutaneous HS Sherman Vogel PA-C   levalbuterol 1 25 mg Nebulization Q6H PRN [employer], DO   levothyroxine 150 mcg Oral Early Morning Danny Fast, DO   melatonin 3 mg Oral HS Bobby Fast, DO   metoprolol tartrate 25 mg Oral BID Bobby Fast, DO   ondansetron 4 mg Intravenous Q6H PRN Sherman Vogel PA-C   sodium chloride 3 mL Nebulization Q6H PRN Bobby Fast, DO   tamsulosin 0 4 mg Oral Daily With Dinner Sherman Vogel PA-C   traZODone 50 mg Oral HS Sherman Vogel PA-C     Continuous Infusions:   PRN Meds:   acetaminophen    ALPRAZolam    levalbuterol    ondansetron    sodium chloride      Invasive Devices     Peripheral Intravenous Line            Peripheral IV 03/08/18 Left;Right Antecubital less than 1 day                  Results from last 7 days  Lab Units 03/09/18  0645 03/08/18  0515 03/07/18  0456 03/06/18  0443   WBC Thousand/uL 4 02* 4 74 4 74 4 76   HEMOGLOBIN g/dL 9 9* 9 9* 10 0* 10 6*   HEMATOCRIT % 33 3* 33 4* 33 8* 35 5*   PLATELETS Thousands/uL 118* 122* 131* 125*   NEUTROS PCT % 52  --  52 56   MONOS PCT % 32*  --  30* 31*   MONO PCT MAN %  --  10  --   --          Results from last 7 days  Lab Units 03/09/18  0645 03/08/18  0515 03/07/18  0456  03/05/18  0436  03/04/18  0606   SODIUM mmol/L 139 139 138  < > 138  < > 138   POTASSIUM mmol/L 5 0 4 9 5 0  < > 5 3  < > 5 8*   CHLORIDE mmol/L 105 104 104  < > 105  < > 104   CO2 mmol/L 28 26 25  < > 24  < > 25   BUN mg/dL 47* 50* 55*  < > 53*  < > 52*   CREATININE mg/dL 1 63* 1 66* 1 82*  < > 1 84*  < > 1 83*   CALCIUM mg/dL 8 1* 8 1* 8 3  < > 8 2*  < > 8 3   TOTAL PROTEIN g/dL  --  6 5  --   --  6 5  --  6 7   BILIRUBIN TOTAL mg/dL  --  0 60  --   --  0 40  --  0 50   ALK PHOS U/L  --  88  --   --  102  --  101   ALT U/L  --  20  --   --  21  --  21   AST U/L  --  18  --   --  22  --  22   GLUCOSE RANDOM mg/dL 117 103 111  < > 108  < > 115   < > = values in this interval not displayed      Lab Results   Component Value Date    CKTOTAL 73 02/28/2018    TROPONINI 0 03 03/04/2018       Lab Results   Component Value Date    INR 1 29 (H) 03/03/2018    INR 1 39 (H) 02/28/2018    INR 1 25 (H) 02/27/2018    PROTIME 16 0 (H) 03/03/2018    PROTIME 17 0 (H) 02/28/2018    PROTIME 15 6 (H) 02/27/2018             Lab, Imaging and other studies: I have personally reviewed pertinent reports      VTE Pharmacologic Prophylaxis: Heparin  VTE Mechanical Prophylaxis: sequential compression device

## 2018-03-09 NOTE — CASE MANAGEMENT
Continued Stay Review    Date: 3/9/2018    Vital Signs: /71 (BP Location: Left arm)   Pulse 68   Temp (!) 96 9 °F (36 1 °C) (Temporal)   Resp 18   Ht 5' 5" (1 651 m)   Wt 78 1 kg (172 lb 2 9 oz)   SpO2 100%   BMI 28 65 kg/m²     Medications:   Scheduled Meds:   Current Facility-Administered Medications:  acetaminophen 650 mg Oral Q6H PRN Axis Stokes, DO   ALPRAZolam 0 25 mg Oral BID PRN Joshua Anders PA-C   aspirin 81 mg Oral Daily Naif Stokes, DO   atorvastatin 40 mg Oral Daily With Clarence Verdin, DO   b complex-vitamin C-folic acid 1 capsule Oral Daily With Dinner Sherman Vogel PA-C   budesonide-formoterol 2 puff Inhalation BID Sherman Vogel PA-C   fluticasone 1 spray Nasal Daily Axis Stokes, DO   furosemide 40 mg Intravenous BID (diuretic) Naif Stokes, DO   heparin (porcine) 5,000 Units Subcutaneous Q8H [facility] Sherman Vogel PA-C   insulin lispro 1-6 Units Subcutaneous TID AC Sherman Vogel PA-C   insulin lispro 1-6 Units Subcutaneous HS Sherman Vogel PA-C   levalbuterol 1 25 mg Nebulization Q6H PRN Naif Stokes, DO   levothyroxine 150 mcg Oral Early Morning Jerry Ochoa, DO   melatonin 3 mg Oral HS Naif Llamasin, DO   metoprolol tartrate 25 mg Oral BID Naif Stokes, DO   ondansetron 4 mg Intravenous Q6H PRN Sherman Vogel PA-C   sodium chloride 3 mL Nebulization Q6H PRN Axis Stokes, DO   tamsulosin 0 4 mg Oral Daily With Dinner Sherman Vogel PA-C   traZODone 50 mg Oral HS Sherman Vogel PA-C     Continuous Infusions:    PRN Meds:   acetaminophen    ALPRAZolam    levalbuterol    ondansetron    sodium chloride    Abnormal Labs/Diagnostic Results: abg-- ph 7 309--pc02 54 7--p02 71 4--02 content 14 0--02 hgb 91 8  Wbc 4 02--hgb 9 9/33 3--platlets 118  Bun 47--cr 1 63--ca 8 1  Chest-The central congestive changes have partially improved compared to the prior examination   Persistent bibasilar patchy densities and probable small left pleural effusion  Age/Sex: 80 y o  male     Assessment/Plan:   Diagnosis    Kidney stone    CKD (chronic kidney disease), stage III    COPD (chronic obstructive pulmonary disease) (HCC)    Hyperlipidemia    Essential hypertension    Type 2 diabetes mellitus (HCC)    Acute on chronic systolic CHF (congestive heart failure) (HCC)    Elevated troponin I level    Acute kidney injury (HCC)    Ureterolithiasis    Thrombocytopenia (HCC)    Leukopenia    Non-ST elevation myocardial infarction (NSTEMI), type 2 (HCC)    Severe aortic stenosis    MRSA colonization    Elevated TSH    Acute cystitis    Pulmonary hypertension    Mitral regurgitation    Aortic regurgitation    Hypothyroidism            Plan:  1) Acute on chronic systolic CHF with EF of 50% / Severe aortic stenosis / Mitral regurgitation/Aortic regurgitation / Pulmonary hypertension: I&O's are inaccurate due to ill fitting catheter  Weight continues to decline  Will continue IV lasix 40 mg BID  2) Confusion / lethargy: will check ABG, repeat chest xray  Will repeat UA/urine culture  Continue to monitor  3) Ureterolithiasis / Acute kidney injury (POA) / Chronic kidney disease stage 3: mild improvement of renal function today  Continue to monitor BMP closely while diuresing  Urinary retention protocol, bladder scans  Appreciate nephrology's input  Repeat CT abdomen and pelvis per urology  4) Acute cystitis secondary to proteus mirabilis: patient completed full antibiotic course  Will repeat UA / culture today  5) Hypothyroidism / Elevated TSH: will continue increased dose of levothyroxine 150 mcg PO Qdaily in the early morning  Recheck TSH level in 4-6 weeks  6) MRSA colonization: patient completed 5 days of Nasal Bactroban  He will need MRSA de-colonization with Hibiclens after discharge       Dispo: possible STR vs  Hospice, depending on clinical improvement   Still requiring inpatient care      Discharge Plan: possible d/c to [facility] when ready for d/c

## 2018-03-09 NOTE — PLAN OF CARE
Problem: SLP ADULT - SWALLOWING, IMPAIRED  Goal: Initial SLP swallow eval performed  Outcome: Completed Date Met: 03/09/18

## 2018-03-09 NOTE — PROGRESS NOTES
Progress Note - Nephrology   Alexandra Jones 80 y o  male MRN: [id number]  Unit/Bed#: 411-01 Encounter: [id number]    A/P:  1  RADAMES on top of CKD               Patient's cause of RADAMES a little difficult to ascertain at this time  He has mild hydronephrosis and we cannot blame the entirety of RADAMES on this finding, there may be a small component of reduced renal function due to this anatomic issue  His history would suggest a volume depleted state with n-v-d, however, on objective physical exam, radiologic and by BNP he appears slightly volume overloaded at this time  I would continue to address possible  infection (cystitis), ceftriaxone a safer and more appropriate medication  Continue with supportive care, I will check urine studies at this time for completeness sake of work up  3/1: Renal function is a little worse this AM, I will give 1L of NS today with gentle infusion rate of 75 ml/hr  Patient's FeNa is <1%, given severe cardiac valvulopathy, he will likely benefit from a little more volume expansion given overall clinical findings and probable positive hemodynamic response  3/2: Patient with minimal response to IVF volume expansion  He could tolerate more volume expansion and I will offer another 1L of NS today  Continue to closely monitor the patient's volume status as he appears to have a small ability to tolerate too much volume expansion as it will lead into CHF               3/3: Receiving IVF for probable volume depletion  Would hold IVF if he is drinking due to history of CHF  Check labs in the morning              3/4: Renal function about the same - follow daily labs              3/5: little change clinically  Receiving diuretic and has mild edema              3/6: renal function stable but above baseline  Weights have risen    Will order a dose of furosemide today              3/7: Renal function stable, however would benefit from increased diuresis              3/8: Diuresing  With a decline in weight and improved aeration and no significant change in renal function               3/9: kidney function slightly better  Continues to diurese  2  CKD III with baseline Cr ~1 4 mg/dL  3  Hypernatremia -resolved  4  HTN + CKD + CHF -patient appears compensated at this time  5  Cystitis               Follow up cultures, continue with ceftriaxone  Culture remains pending  6  Nephrolithiasis with mild right sided hydroureteronephrosis              No intervention from Urology at this time, we will continue to treat medically  7  Severe pulmonary HTN  8  Mod to severe mitral regurgitation  9  Hyperkalemia" recheck and place potassium restriction in diet      3/8: Potassium WNL      Follow up reason for today's visit: RADAMES/CKD    Acute on chronic systolic CHF (congestive heart failure) ([facility] [address] )    Patient Active Problem List   Diagnosis    Kidney stone    CKD (chronic kidney disease), stage III    [facility]    Hyperlipidemia    Essential hypertension    Type 2 diabetes mellitus (HCC)    Acute on chronic systolic CHF (congestive heart failure) ([facility])    Elevated troponin I level    Acute kidney injury (HCC)    Ureterolithiasis    Thrombocytopenia (HCC)    Leukopenia    Non-ST elevation myocardial infarction (NSTEMI), type 2 (HCC)    Severe aortic stenosis    MRSA colonization    Elevated TSH    Acute cystitis    Pulmonary hypertension    Mitral regurgitation    Aortic regurgitation    Hypothyroidism         Subjective:     Patient without acute events overnight  Objective:     Vitals: Blood pressure 131/71, pulse 68, temperature (!) 96 9 °F (36 1 °C), temperature source Temporal, resp  rate 18, height 5' 5" (1 651 m), weight 78 1 kg (172 lb 2 9 oz), SpO2 100 %  ,Body mass index is 28 65 kg/m²      Weight (last 2 days)     Date/Time   Weight    03/09/18 0600  78 1 (172 18)    03/08/18 0539  81 8 (180 34)    03/07/18 0600  82 1 (181) Intake/Output Summary (Last 24 hours) at 03/09/18 0927  Last data filed at 03/09/18 0837   Gross per 24 hour   Intake              960 ml   Output              475 ml   Net              485 ml     I/O last 3 completed shifts: In: 12 [P O :960]  Out: 576 [Urine:576]         Physical Exam: /71 (BP Location: Left arm)   Pulse 68   Temp (!) 96 9 °F (36 1 °C) (Temporal)   Resp 18   Ht 5' 5" (1 651 m)   Wt 78 1 kg (172 lb 2 9 oz)   SpO2 100%   BMI 28 65 kg/m²     General Appearance:    Sleepy but arouseable, appears stated age   Head:    Normocephalic, without obvious abnormality, atraumatic   Eyes:    Conjunctiva/corneas clear   Ears:    Normal external ears   Nose:   Nares normal, septum midline, mucosa normal, no drainage    or sinus tenderness   Throat:   Lips, mucosa, and tongue normal; teeth and gums normal   Neck:   Supple   Back:     Symmetric, no curvature, ROM normal, no CVA tenderness   Lungs:     Has crackes 1/3 way up   Chest wall:    No tenderness or deformity   Heart:    Regular rate and rhythm, S1 and S2 normal, no murmur, rub   or gallop   Abdomen:     Soft, non-tender, bowel sounds active   Extremities:   Extremities normal, atraumatic, no cyanosis, +2 edema bilaterally 2+ edema   Skin:   Skin color, texture, turgor normal, no rashes or lesions   Lymph nodes:   Cervical normal   Neurologic:   CNII-XII intact            Lab, Imaging and other studies: I have personally reviewed pertinent labs    CBC:   Lab Results   Component Value Date    WBC 4 02 (L) 03/09/2018    HGB 9 9 (L) 03/09/2018    HCT 33 3 (L) 03/09/2018    MCV 85 03/09/2018     (L) 03/09/2018    MCH 25 4 (L) 03/09/2018    MCHC 29 7 (L) 03/09/2018    RDW 18 7 (H) 03/09/2018    MPV 10 2 03/09/2018     CMP:   Lab Results   Component Value Date     03/09/2018    K 5 0 03/09/2018     03/09/2018    CO2 28 03/09/2018    ANIONGAP 6 03/09/2018    BUN 47 (H) 03/09/2018    CREATININE 1 63 (H) 03/09/2018    GLUCOSE 117 03/09/2018    CALCIUM 8 1 (L) 03/09/2018    EGFR 37 03/09/2018         Results from last 7 days  Lab Units 03/09/18  0645 03/08/18  0515 03/07/18  0456  03/05/18  0436  03/04/18  0606   SODIUM mmol/L 139 139 138  < > 138  < > 138   POTASSIUM mmol/L 5 0 4 9 5 0  < > 5 3  < > 5 8*   CHLORIDE mmol/L 105 104 104  < > 105  < > 104   CO2 mmol/L 28 26 25  < > 24  < > 25   BUN mg/dL 47* 50* 55*  < > 53*  < > 52*   CREATININE mg/dL 1 63* 1 66* 1 82*  < > 1 84*  < > 1 83*   CALCIUM mg/dL 8 1* 8 1* 8 3  < > 8 2*  < > 8 3   TOTAL PROTEIN g/dL  --  6 5  --   --  6 5  --  6 7   BILIRUBIN TOTAL mg/dL  --  0 60  --   --  0 40  --  0 50   ALK PHOS U/L  --  88  --   --  102  --  101   ALT U/L  --  20  --   --  21  --  21   AST U/L  --  18  --   --  22  --  22   GLUCOSE RANDOM mg/dL 117 103 111  < > 108  < > 115   < > = values in this interval not displayed  Phosphorus:   No results found for: PHOS  Magnesium:   No results found for: MG  Urinalysis:   No results found for: Nickola Gutiérrez, SPECGRAV, PHUR, LEUKOCYTESUR, NITRITE, PROTEINUA, GLUCOSEU, KETONESU, BILIRUBINUR, BLOODU  Ionized Calcium: No results found for: CAION  Coagulation:   No results found for: PT, INR, APTT  Troponin:   No results found for: TROPONINI  ABG: No results found for: PHART, UBL4HEK, PO2ART, QLN9ZZU, A1PADGEJ, BEART, SOURCE  Radiology review:     IMAGING  Procedure: Ct Abdomen Pelvis Wo Contrast    Result Date: 2/27/2018  Narrative: CT ABDOMEN AND PELVIS WITHOUT IV CONTRAST INDICATION: diarhea abdm distention  History taken directly from the electronic ordering system  COMPARISON: None  TECHNIQUE:  CT examination of the abdomen and pelvis was performed without intravenous contrast   Axial, sagittal, and coronal 2D reformatted images were created from the source data and submitted for interpretation  Radiation dose length product (DLP) for this visit:  721 9 mGy-cm     This examination, like all CT scans performed in the [facility], was performed utilizing techniques to minimize radiation dose exposure, including the use of iterative reconstruction and automated exposure control  Enteric contrast was not administered  FINDINGS: ABDOMEN LOWER CHEST:  Bilateral pleural effusions, right greater than left  Atelectatic versus consolidative changes at the left base  LIVER/BILIARY TREE:  Within limitations of noncontrast, no focal lesion  GALLBLADDER:  No calcified gallstones  No pericholecystic inflammatory change  SPLEEN:  Unremarkable  PANCREAS:  Much of the pancreas is atrophic  ADRENAL GLANDS:  Unremarkable  KIDNEYS/URETERS:  Simple appearing left renal cysts  6 mm catheter in the distal left ureter just proximal to the UVJ  Mild left hydroureter without substantial hydronephrosis  Punctate calculus in the superior pole of the left kidney  STOMACH AND BOWEL:  No evidence for bowel obstruction  Diverticulosis  No clear evidence for diverticulitis or colitis  APPENDIX:  No findings to suggest appendicitis  ABDOMINOPELVIC CAVITY:  Mild abdominal ascites  Scattered lymph nodes in the retroperitoneum which are nonspecific  VESSELS:  Atherosclerotic changes are present  No evidence of aneurysm  PELVIS REPRODUCTIVE ORGANS:  Prostatomegaly URINARY BLADDER:  Bladder is slightly thick-walled  Correlate with UA for cystitis in the possibility of chronic bladder outlet obstruction  ABDOMINAL WALL/INGUINAL REGIONS:  Anasarca  OSSEOUS STRUCTURES:  No acute fracture or destructive osseous lesion  Impression: 6 mm calculus in the distal left ureter just proximal to the UVJ resulting in mild left-sided hydroureter but no significant hydronephrosis  Bilateral pleural effusions, right greater than left with atelectatic versus consolidative change at the left base  Thick-walled bladder  Could reflect cystitis versus sequela chronic outlet obstruction  Mild abdominal ascites   Workstation performed: [id number]     Procedure: Xr Chest 1 View Portable    Result Date: 2/27/2018  Narrative: CHEST INDICATION:  Shortness of breath  diarrhea COMPARISON:  October 4, 2017  EXAM PERFORMED/VIEWS:  XR CHEST PORTABLE FINDINGS: Heart enlarged  ICD present  Pulmonary vessels prominent and indistinct  Opacification in the lower left hemithorax, probably a combination of pleural effusion and atelectasis or consolidation in the left lower lobe  Right lung clear  No pneumothorax  Osseous structures appear within normal limits for patient age  Impression: 1  Cardiomegaly and pulmonary vascular congestion  2   Probable small left pleural effusion and atelectasis or consolidation in the left lower lobe  Workstation performed: [id number]     Procedure: Us Kidney And Bladder    Result Date: 2/28/2018  Narrative: RENAL ULTRASOUND INDICATION: RENAL FAILURE, ACUTE RENAL FAILURE, CHRONIC arf, crf COMPARISON: CT 2/27/2018 TECHNIQUE:   Ultrasound of the retroperitoneum was performed with a curvilinear transducer utilizing volumetric sweeps and still imaging techniques  FINDINGS: KIDNEYS: Symmetric and normal size  Right kidney:  9 6 cm  Left kidney:  9 8 cm  Right kidney The renal parenchyma is diffusely echogenic consistent with medical renal disease  No suspicious masses detected  No hydronephrosis  No shadowing calculi  No perinephric fluid collections  Left kidney The renal parenchyma is diffusely echogenic consistent with medical renal disease  No suspicious masses detected  Left renal simple cysts  No hydronephrosis  No shadowing calculi  No perinephric fluid collections  URETERS: Nonvisualized  BLADDER: Diffuse bladder wall thickening could relate to cystitis or outlet obstruction changes, correlate clinically  4 7 x 2 9 x 3 6 cm prostate protrudes into the base of the bladder  Small amount of ascites noted in the left upper abdominal quadrant as on CT  Impression: 1  Bilateral medical renal disease  2   No hydronephrosis  3   Left renal cysts    4   Diffuse bladder wall thickening could relate to cystitis or outlet obstruction changes, correlate clinically  4 7 x 2 9 x 3 6 cm prostate protrudes into the base of the bladder   Workstation performed: [id number]       Current Facility-Administered Medications   Medication Dose Route Frequency    acetaminophen (TYLENOL) tablet 650 mg  650 mg Oral Q6H PRN    ALPRAZolam (XANAX) tablet 0 25 mg  0 25 mg Oral BID PRN    aspirin chewable tablet 81 mg  81 mg Oral Daily    atorvastatin (LIPITOR) tablet 40 mg  40 mg Oral Daily With Dinner    b complex-vitamin C-folic acid (NEPHROCAPS) capsule 1 capsule  1 capsule Oral Daily With Dinner    budesonide-formoterol (SYMBICORT) 160-4 5 mcg/act inhaler 2 puff  2 puff Inhalation BID    fluticasone (FLONASE) 50 mcg/act nasal spray 1 spray  1 spray Nasal Daily    furosemide (LASIX) injection 40 mg  40 mg Intravenous BID (diuretic)    heparin (porcine) subcutaneous injection 5,000 Units  5,000 Units Subcutaneous Q8H [facility]    insulin lispro (HumaLOG) 100 units/mL subcutaneous injection 1-6 Units  1-6 Units Subcutaneous TID AC    insulin lispro (HumaLOG) 100 units/mL subcutaneous injection 1-6 Units  1-6 Units Subcutaneous HS    levalbuterol (XOPENEX) inhalation solution 1 25 mg  1 25 mg Nebulization Q6H PRN    levothyroxine tablet 150 mcg  150 mcg Oral Early Morning    melatonin tablet 3 mg  3 mg Oral HS    metoprolol tartrate (LOPRESSOR) tablet 25 mg  25 mg Oral BID    ondansetron (ZOFRAN) injection 4 mg  4 mg Intravenous Q6H PRN    sodium chloride 0 9 % inhalation solution 3 mL  3 mL Nebulization Q6H PRN    tamsulosin (FLOMAX) capsule 0 4 mg  0 4 mg Oral Daily With Dinner    traZODone (DESYREL) tablet 50 mg  50 mg Oral HS     Medications Discontinued During This Encounter   Medication Reason    aspirin 81 MG tablet     metoprolol tartrate (LOPRESSOR) 50 mg tablet Dose adjustment    simvastatin (ZOCOR) 40 mg tablet Dose adjustment    albuterol (PROVENTIL HFA,VENTOLIN HFA) 90 mcg/act inhaler     allopurinol (ZYLOPRIM) 100 mg tablet     Calcium Carbonate (CALCIUM 600 HIGH POTENCY PO)     DULoxetine (CYMBALTA) 30 mg delayed release capsule     fluticasone-salmeterol (ADVAIR) 250-50 mcg/dose inhaler     furosemide (LASIX) 40 mg tablet     glipiZIDE (GLUCOTROL) 5 mg tablet     indomethacin (INDOCIN) 50 mg capsule     IRON PO     Linagliptin (TRADJENTA) 5 MG TABS     losartan (COZAAR) 50 mg tablet     metolazone (ZAROXOLYN) 5 mg tablet     Misc Natural Products (TART CHERRY ADVANCED PO)     Nutritional Supplements (VITAMIN D BOOSTER PO)     oxyCODONE-acetaminophen (PERCOCET) 5-325 mg per tablet     potassium chloride (MICRO-K) 10 MEQ CR capsule     POTASSIUM GLUCONATE PO     vitamin E, tocopherol, 400 units capsule     sodium chloride 0 9 % infusion     bumetanide (BUMEX) tablet 1 mg     finasteride (PROSCAR) tablet 5 mg     hydrOXYzine HCL (ATARAX) tablet 25 mg     zolpidem (AMBIEN) tablet 5 mg     fluticasone (FLONASE) 50 mcg/act nasal spray 2 spray     acetaminophen (TYLENOL) tablet 650 mg     aspirin tablet 325 mg     pantoprazole (PROTONIX) EC tablet 20 mg     pravastatin (PRAVACHOL) tablet 40 mg     cefTRIAXone (ROCEPHIN) IVPB (premix) 1,000 mg     levothyroxine tablet 125 mcg     sodium chloride 0 9 % infusion     cefTRIAXone (ROCEPHIN) IVPB (premix) 1,000 mg     hydrOXYzine HCL (ATARAX) tablet 25 mg     furosemide (LASIX) injection 40 mg        Nash Bucio MD

## 2018-03-09 NOTE — SPEECH THERAPY NOTE
Speech Language/Pathology  Speech/Language Pathology  Assessment    Patient Name: Rasta James  [id number] Date: 3/9/2018     Problem List  Patient Active Problem List   Diagnosis    Kidney stone    CKD (chronic kidney disease), stage III    [facility]) ([facility] [address] )    Hyperlipidemia    Essential hypertension    Type 2 diabetes mellitus (HCC)    Acute on chronic systolic CHF (congestive heart failure) (HCC)    Elevated troponin I level    Acute kidney injury (HCC)    Ureterolithiasis    Thrombocytopenia (HCC)    Leukopenia    Non-ST elevation myocardial infarction (NSTEMI), type 2 (HCC)    Severe aortic stenosis    MRSA colonization    Elevated TSH    Acute cystitis    Pulmonary hypertension    Mitral regurgitation    Aortic regurgitation    Hypothyroidism     Past Medical History  Past Medical History:   Diagnosis Date    AICD (automatic cardioverter/defibrillator) present     Aortic stenosis     Arthritis     [facility]     Constipation     Dependent on walker for ambulation     Diabetes mellitus (HCC)     NIDDM - on no meds presently    Hyperlipidemia     Hypertension     Lumbar back pain     Lumbar spondylosis     Myocardial infarction     MI X2    Stage 3a chronic kidney disease     Wears glasses      Past Surgical History  Past Surgical History:   Procedure Laterality Date    CARDIAC DEFIBRILLATOR PLACEMENT      CARDIAC DEFIBRILLATOR PLACEMENT      CARPAL TUNNEL RELEASE      CATARACT EXTRACTION Bilateral     HERNIA REPAIR      RHIZOTOMY Right 3/16/2017    Procedure: RHIZOTOMY LUMBAR,RADIO FREQUENCY LESIONING L3-4 L4-5 L5-S1;  Surgeon: Aria Baker MD;  Location: [facility];  Service:         03/09/18 4607   Swallow Information   Current Risks for Dysphagia & Aspiration Cognitive deficit; Mental status change   Current Symptoms/Concerns Choking episodes   Current Diet NPO  (previous level 3 and thin liquids ) Baseline Assessment   Behavior/Cognition Lethargic;Cooperative; Interactive;Agitated; Requires cueing   Speech/Language Status Verbal and answering questions  Patient Positioning Upright in bed   Swallow Mechanism Exam   Lingual Strength Mild reduced   Lingual ROM Mild reduced   Gag WFL   Mandible WFL   Dentition Adequate   Volitional Cough Strong   Respirations 16   SpO2 97 %   Consistencies Assessed and Performance   Materials Admnistered Mechanical Soft/Level 2;Soft/Level 3;Puree/Level 1; Thin liquid   Oral Stage Mild impaired   Oral Stage Comment Patient has mildly reduced mastication and AP transfer with soft solids  Phargngeal Stage WFL; Aspiration risk; With limited texture   Pharyngeal Stage Comment Patient had no overt s/s with puree and MS and soft solids at bedside evaluation  No s/s with thin liquids  Swallow Mechanics Swallow initation;Good Larygneal rise;Aspiration risk;Mild delayed   Summary   Swallow Summary Patient is seen again for evaluation after episode of choking while eating a sandwich  The patient presents more tired and with mildly reduced lingual strength and ROM  He has extended mastication time with soft sandwich trial, and needed liquid wash to complete  MS and Puree trials were [facility]  Thin liquids via straw were without s/s  Recommendations   Risk for Aspiration Mild   Recommendations Consider oral diet; Dysphagia treatment   Diet Solid Recommendation Level 2 Dysphagia/ mechanical soft/altered   Diet Liquid Recommendation Thin liquid   Recommended Form of Meds As desired; As tolerated   General Precautions Aspiration precautions; Feed only when alert;Upright as possible for all oral intake;Remain upright for 45 mins after meals;Assist with feeding   Compensatory Swallowing Strategies Alternate solids and liquids   Results Reviewed with RN;PT/Family/Caregiver   Treatment Recommendations   Duration of treatment one week   Follow up treatments Assure diet tolerance   Dysphagia Goals Patient will tolerate recommended diet without observed clinical signs of oral/pharngeal dysphagia; Patient will tolerate advanced diet with no signs of oral or pharngeal dysphagia   Speech Therapy Prognosis   Prognosis Guarded

## 2018-03-09 NOTE — PHYSICAL THERAPY NOTE
PT treatment note      03/09/18 1058   Subjective   Subjective Tired  Wants to return to bed  Bed Mobility   Sit to Supine 4  Minimal assistance   Additional items Assist x 1;Bedrails; Increased time required;Verbal cues;LE management   Transfers   Sit to Stand 4  Minimal assistance   Additional items Assist x 1; Armrests; Verbal cues   Stand to Sit 4  Minimal assistance   Additional items Assist x 1;Verbal cues   Stand pivot 4  Minimal assistance   Additional items Assist x 1;Verbal cues   Ambulation/Elevation   Gait pattern Forward Flexion;Decreased foot clearance   Gait Assistance 4  Minimal assist   Additional items Assist x 1   Assistive Device Rolling walker   Distance 15' slow tomás   Balance   Static Sitting Fair +   Dynamic Sitting Fair +   Static Standing Fair   Dynamic Standing Fair   Ambulatory Fair  (with RW)   Endurance Deficit   Endurance Deficit Yes   Activity Tolerance   Activity Tolerance Patient limited by fatigue   Assessment   Prognosis Good   Problem List Decreased strength;Decreased endurance; Impaired balance;Decreased mobility; Decreased safety awareness   Assessment Perfoming functional mobility at (min) x1 level of function  Cues for technique and safety  Excessively slow tomás, cues for technique and safety  Pt would benefit from continued PT as well as d/c back to [facility] with skilled PT when medically stable  Plan   Treatment/Interventions Functional transfer training;LE strengthening/ROM; Therapeutic exercise; Endurance training;Bed mobility;Gait training   Progress Slow progress, decreased activity tolerance   Recommendation   Recommendation Short-term skilled PT   Pt  In bed  with call bell within reach and alarm on at end of PT session

## 2018-03-10 LAB
ADENOVIRUS: NOT DETECTED
ANION GAP SERPL CALCULATED.3IONS-SCNC: 6 MMOL/L (ref 4–13)
ANISOCYTOSIS BLD QL SMEAR: PRESENT
BASOPHILS # BLD MANUAL: 0 THOUSAND/UL (ref 0–0.1)
BASOPHILS NFR MAR MANUAL: 0 % (ref 0–1)
BUN SERPL-MCNC: 43 MG/DL (ref 5–25)
C PNEUM DNA SPEC QL NAA+PROBE: NOT DETECTED
CALCIUM SERPL-MCNC: 8 MG/DL (ref 8.3–10.1)
CHLORIDE SERPL-SCNC: 105 MMOL/L (ref 100–108)
CO2 SERPL-SCNC: 30 MMOL/L (ref 21–32)
CREAT SERPL-MCNC: 1.41 MG/DL (ref 0.6–1.3)
EOSINOPHIL # BLD MANUAL: 0.11 THOUSAND/UL (ref 0–0.4)
EOSINOPHIL NFR BLD MANUAL: 3 % (ref 0–6)
ERYTHROCYTE [DISTWIDTH] IN BLOOD BY AUTOMATED COUNT: 18.6 % (ref 11.6–15.1)
FLUAV H1 RNA SPEC QL NAA+PROBE: NOT DETECTED
FLUAV H3 RNA SPEC QL NAA+PROBE: NOT DETECTED
FLUAV RNA SPEC QL NAA+PROBE: NOT DETECTED
FLUBV RNA SPEC QL NAA+PROBE: NOT DETECTED
GFR SERPL CREATININE-BSD FRML MDRD: 44 ML/MIN/1.73SQ M
GLUCOSE SERPL-MCNC: 104 MG/DL (ref 65–140)
GLUCOSE SERPL-MCNC: 133 MG/DL (ref 65–140)
GLUCOSE SERPL-MCNC: 142 MG/DL (ref 65–140)
GLUCOSE SERPL-MCNC: 167 MG/DL (ref 65–140)
GLUCOSE SERPL-MCNC: 82 MG/DL (ref 65–140)
HBOV DNA SPEC QL NAA+PROBE: NOT DETECTED
HCOV 229E RNA SPEC QL NAA+PROBE: NOT DETECTED
HCOV HKU1 RNA SPEC QL NAA+PROBE: NOT DETECTED
HCOV NL63 RNA SPEC QL NAA+PROBE: NOT DETECTED
HCOV OC43 RNA SPEC QL NAA+PROBE: NOT DETECTED
HCT VFR BLD AUTO: 31 % (ref 36.5–49.3)
HGB BLD-MCNC: 9.2 G/DL (ref 12–17)
HPIV1 RNA SPEC QL NAA+PROBE: NOT DETECTED
HPIV2 RNA SPEC QL NAA+PROBE: NOT DETECTED
HPIV3 RNA SPEC QL NAA+PROBE: NOT DETECTED
HPIV4 RNA SPEC QL NAA+PROBE: NOT DETECTED
LYMPHOCYTES # BLD AUTO: 0.57 THOUSAND/UL (ref 0.6–4.47)
LYMPHOCYTES # BLD AUTO: 15 % (ref 14–44)
M PNEUMO DNA SPEC QL NAA+PROBE: NOT DETECTED
MCH RBC QN AUTO: 25.3 PG (ref 26.8–34.3)
MCHC RBC AUTO-ENTMCNC: 29.7 G/DL (ref 31.4–37.4)
MCV RBC AUTO: 85 FL (ref 82–98)
METAPNEUMOVIRUS: NOT DETECTED
MONOCYTES # BLD AUTO: 0.99 THOUSAND/UL (ref 0–1.22)
MONOCYTES NFR BLD: 26 % (ref 4–12)
NEUTROPHILS # BLD MANUAL: 2.14 THOUSAND/UL (ref 1.85–7.62)
NEUTS SEG NFR BLD AUTO: 56 % (ref 43–75)
PLATELET # BLD AUTO: 105 THOUSANDS/UL (ref 149–390)
PLATELET BLD QL SMEAR: ABNORMAL
PMV BLD AUTO: 10.8 FL (ref 8.9–12.7)
POTASSIUM SERPL-SCNC: 4.5 MMOL/L (ref 3.5–5.3)
RBC # BLD AUTO: 3.63 MILLION/UL (ref 3.88–5.62)
RHINOVIRUS RNA SPEC QL NAA+PROBE: NOT DETECTED
RSV A RNA SPEC QL NAA+PROBE: NOT DETECTED
RSV B RNA SPEC QL NAA+PROBE: NOT DETECTED
SODIUM SERPL-SCNC: 141 MMOL/L (ref 136–145)
TOTAL CELLS COUNTED SPEC: 100
WBC # BLD AUTO: 3.82 THOUSAND/UL (ref 4.31–10.16)

## 2018-03-10 PROCEDURE — 94762 N-INVAS EAR/PLS OXIMTRY CONT: CPT

## 2018-03-10 PROCEDURE — 85027 COMPLETE CBC AUTOMATED: CPT | Performed by: PHYSICIAN ASSISTANT

## 2018-03-10 PROCEDURE — 80048 BASIC METABOLIC PNL TOTAL CA: CPT | Performed by: PHYSICIAN ASSISTANT

## 2018-03-10 PROCEDURE — 94760 N-INVAS EAR/PLS OXIMETRY 1: CPT

## 2018-03-10 PROCEDURE — 85007 BL SMEAR W/DIFF WBC COUNT: CPT | Performed by: PHYSICIAN ASSISTANT

## 2018-03-10 PROCEDURE — 99232 SBSQ HOSP IP/OBS MODERATE 35: CPT | Performed by: INTERNAL MEDICINE

## 2018-03-10 PROCEDURE — 82948 REAGENT STRIP/BLOOD GLUCOSE: CPT

## 2018-03-10 RX ORDER — BUMETANIDE 1 MG/1
1 TABLET ORAL 2 TIMES DAILY
Status: DISCONTINUED | OUTPATIENT
Start: 2018-03-10 | End: 2018-03-12 | Stop reason: HOSPADM

## 2018-03-10 RX ADMIN — ATORVASTATIN CALCIUM 40 MG: 40 TABLET, FILM COATED ORAL at 18:10

## 2018-03-10 RX ADMIN — HEPARIN SODIUM 5000 UNITS: 5000 INJECTION, SOLUTION INTRAVENOUS; SUBCUTANEOUS at 14:33

## 2018-03-10 RX ADMIN — ASPIRIN 81 MG 81 MG: 81 TABLET ORAL at 08:07

## 2018-03-10 RX ADMIN — HEPARIN SODIUM 5000 UNITS: 5000 INJECTION, SOLUTION INTRAVENOUS; SUBCUTANEOUS at 05:28

## 2018-03-10 RX ADMIN — INSULIN LISPRO 1 UNITS: 100 INJECTION, SOLUTION INTRAVENOUS; SUBCUTANEOUS at 16:20

## 2018-03-10 RX ADMIN — Medication 1 CAPSULE: at 18:11

## 2018-03-10 RX ADMIN — BUDESONIDE AND FORMOTEROL FUMARATE DIHYDRATE 2 PUFF: 160; 4.5 AEROSOL RESPIRATORY (INHALATION) at 20:49

## 2018-03-10 RX ADMIN — MELATONIN 3 MG: 3 TAB ORAL at 21:52

## 2018-03-10 RX ADMIN — METOPROLOL TARTRATE 25 MG: 25 TABLET ORAL at 18:10

## 2018-03-10 RX ADMIN — METOPROLOL TARTRATE 25 MG: 25 TABLET ORAL at 08:09

## 2018-03-10 RX ADMIN — TAMSULOSIN HYDROCHLORIDE 0.4 MG: 0.4 CAPSULE ORAL at 18:10

## 2018-03-10 RX ADMIN — TRAZODONE HYDROCHLORIDE 50 MG: 50 TABLET ORAL at 21:52

## 2018-03-10 RX ADMIN — BUDESONIDE AND FORMOTEROL FUMARATE DIHYDRATE 2 PUFF: 160; 4.5 AEROSOL RESPIRATORY (INHALATION) at 08:07

## 2018-03-10 RX ADMIN — FUROSEMIDE 40 MG: 10 INJECTION, SOLUTION INTRAMUSCULAR; INTRAVENOUS at 08:08

## 2018-03-10 RX ADMIN — HEPARIN SODIUM 5000 UNITS: 5000 INJECTION, SOLUTION INTRAVENOUS; SUBCUTANEOUS at 21:52

## 2018-03-10 RX ADMIN — LEVOTHYROXINE SODIUM 150 MCG: 150 TABLET ORAL at 05:28

## 2018-03-10 RX ADMIN — FLUTICASONE PROPIONATE 1 SPRAY: 50 SPRAY, METERED NASAL at 08:08

## 2018-03-10 RX ADMIN — BUMETANIDE 1 MG: 1 TABLET ORAL at 18:10

## 2018-03-10 RX ADMIN — ALPRAZOLAM 0.25 MG: 0.25 TABLET ORAL at 02:41

## 2018-03-10 NOTE — SOCIAL WORK
The paperwork is completed at the [facility] and he will need to be authed to go back to the skilled nursing [place] but if auth complete he can go to the [facility] nursing [facility]

## 2018-03-10 NOTE — PROGRESS NOTES
[facility] Internal Medicine Progress Note  Patient: Morales Chilel 80 y o  male   MRN: [id number]  PCP: Fern Cadena MD  Unit/Bed#: 690-49 Encounter: [id number]  Date Of Visit: 03/10/18    Assessment:    Principal Problem:    Acute on chronic systolic CHF (congestive heart failure) ([facility] )  Active Problems:    Kidney stone    CKD (chronic kidney disease), stage III    COPD (chronic obstructive pulmonary disease) ([place] [address] )    Hyperlipidemia    Essential hypertension    Type 2 diabetes mellitus (HCC)    Elevated troponin I level    Acute kidney injury ([facility] )    Ureterolithiasis    Thrombocytopenia (HCC)    Leukopenia    Non-ST elevation myocardial infarction (NSTEMI), type 2 (HCC)    Severe aortic stenosis    MRSA colonization    Elevated TSH    Acute cystitis    Pulmonary hypertension    Mitral regurgitation    Aortic regurgitation    Hypothyroidism      Plan:    · Patient is stable today on 2 L via nasal cannula, discharge with continued medical therapy, if decline recommend discharge with hospice  Changed to p o  Bumex, likely discharge     Current Length of Stay: 11 day(s)    Current Patient Status: Inpatient     Code Status: Level 1 - Full Code      Subjective:   Patient denies any shortness of breath or pain at rest    Objective:     Vitals:   Temp (24hrs), Av 2 °F (36 2 °C), Min:96 2 °F (35 7 °C), Max:98 4 °F (36 9 °C)    HR:  [60-62] 60  Resp:  [16-20] 16  BP: (113-134)/(59-74) 133/74  SpO2:  [91 %-100 %] 100 %  Body mass index is 28 58 kg/m²  Input and Output Summary (last 24 hours): Intake/Output Summary (Last 24 hours) at 03/10/18 1341  Last data filed at 03/10/18 1307   Gross per 24 hour   Intake              540 ml   Output              610 ml   Net              -70 ml       Physical Exam:     Physical Exam   Constitutional: No distress  Pulmonary/Chest: Effort normal and breath sounds normal  No respiratory distress  He has no wheezes     Musculoskeletal: He exhibits edema    Skin: Skin is warm and dry  He is not diaphoretic  Nursing note and vitals reviewed  Additional Data:     Labs:      Results from last 7 days  Lab Units 03/10/18  0525 03/09/18  0645   WBC Thousand/uL 3 82* 4 02*   HEMOGLOBIN g/dL 9 2* 9 9*   HEMATOCRIT % 31 0* 33 3*   PLATELETS Thousands/uL 105* 118*   NEUTROS PCT %  --  52   LYMPHS PCT %  --  15   LYMPHO PCT % 15  --    MONOS PCT %  --  32*   MONO PCT MAN % 26*  --    EOS PCT %  --  1   EOSINO PCT MANUAL % 3  --        Results from last 7 days  Lab Units 03/10/18  0525  03/08/18  0515   SODIUM mmol/L 141  < > 139   POTASSIUM mmol/L 4 5  < > 4 9   CHLORIDE mmol/L 105  < > 104   CO2 mmol/L 30  < > 26   BUN mg/dL 43*  < > 50*   CREATININE mg/dL 1 41*  < > 1 66*   CALCIUM mg/dL 8 0*  < > 8 1*   TOTAL PROTEIN g/dL  --   --  6 5   BILIRUBIN TOTAL mg/dL  --   --  0 60   ALK PHOS U/L  --   --  88   ALT U/L  --   --  20   AST U/L  --   --  18   GLUCOSE RANDOM mg/dL 104  < > 103   < > = values in this interval not displayed  * I Have Reviewed All Lab Data Listed Above  * Additional Pertinent Lab Tests Reviewed:  [facility] Admission Reviewed      Recent Cultures (last 7 days):           Last 24 Hours Medication List:     Current Facility-Administered Medications:  acetaminophen 650 mg Oral Q6H PRN Liberty Diss, DO   ALPRAZolam 0 25 mg Oral BID PRN Dante Carlin PA-C   aspirin 81 mg Oral Daily Johannah Diss, DO   atorvastatin 40 mg Oral Daily With Clarence Verdin, DO   b complex-vitamin C-folic acid 1 capsule Oral Daily With Dinner Sherman Vogel PA-C   budesonide-formoterol 2 puff Inhalation BID Sherman Vogel PA-C   bumetanide 1 mg Oral BID De Latanya,    fluticasone 1 spray Nasal Daily Johannah Diss, DO   heparin (porcine) 5,000 Units Subcutaneous Q8H [facility] Sherman Vogel PA-C   insulin lispro 1-6 Units Subcutaneous TID  Sherman Vogel PA-C   insulin lispro 1-6 Units Subcutaneous HS Sherman Vogel PA-C   levalbuterol 1 25 mg Nebulization Q6H PRN [employer], DO   levothyroxine 150 mcg Oral Early Morning [employer], DO   melatonin 3 mg Oral HS [employer], DO   metoprolol tartrate 25 mg Oral BID [employer], DO   ondansetron 4 mg Intravenous Q6H PRN Sherman Vogel PA-C   sodium chloride 3 mL Nebulization Q6H PRN [employer], DO   tamsulosin 0 4 mg Oral Daily With Dinner Sherman Vogel PA-C   traZODone 50 mg Oral HS Sherman Vogel PA-C        Today, Patient Was Seen By: Fern Rich DO

## 2018-03-11 LAB
GLUCOSE SERPL-MCNC: 126 MG/DL (ref 65–140)
GLUCOSE SERPL-MCNC: 133 MG/DL (ref 65–140)
GLUCOSE SERPL-MCNC: 163 MG/DL (ref 65–140)
GLUCOSE SERPL-MCNC: 182 MG/DL (ref 65–140)

## 2018-03-11 PROCEDURE — 99231 SBSQ HOSP IP/OBS SF/LOW 25: CPT | Performed by: INTERNAL MEDICINE

## 2018-03-11 PROCEDURE — 82948 REAGENT STRIP/BLOOD GLUCOSE: CPT

## 2018-03-11 PROCEDURE — 97116 GAIT TRAINING THERAPY: CPT | Performed by: PHYSICAL THERAPIST

## 2018-03-11 PROCEDURE — 97530 THERAPEUTIC ACTIVITIES: CPT

## 2018-03-11 RX ADMIN — INSULIN LISPRO 1 UNITS: 100 INJECTION, SOLUTION INTRAVENOUS; SUBCUTANEOUS at 12:25

## 2018-03-11 RX ADMIN — HEPARIN SODIUM 5000 UNITS: 5000 INJECTION, SOLUTION INTRAVENOUS; SUBCUTANEOUS at 15:52

## 2018-03-11 RX ADMIN — INSULIN LISPRO 1 UNITS: 100 INJECTION, SOLUTION INTRAVENOUS; SUBCUTANEOUS at 22:17

## 2018-03-11 RX ADMIN — FLUTICASONE PROPIONATE 1 SPRAY: 50 SPRAY, METERED NASAL at 09:28

## 2018-03-11 RX ADMIN — TAMSULOSIN HYDROCHLORIDE 0.4 MG: 0.4 CAPSULE ORAL at 15:52

## 2018-03-11 RX ADMIN — ASPIRIN 81 MG 81 MG: 81 TABLET ORAL at 09:28

## 2018-03-11 RX ADMIN — ACETAMINOPHEN 650 MG: 325 TABLET, FILM COATED ORAL at 01:48

## 2018-03-11 RX ADMIN — BUMETANIDE 1 MG: 1 TABLET ORAL at 18:49

## 2018-03-11 RX ADMIN — HEPARIN SODIUM 5000 UNITS: 5000 INJECTION, SOLUTION INTRAVENOUS; SUBCUTANEOUS at 22:17

## 2018-03-11 RX ADMIN — ATORVASTATIN CALCIUM 40 MG: 40 TABLET, FILM COATED ORAL at 15:52

## 2018-03-11 RX ADMIN — TRAZODONE HYDROCHLORIDE 50 MG: 50 TABLET ORAL at 22:17

## 2018-03-11 RX ADMIN — METOPROLOL TARTRATE 25 MG: 25 TABLET ORAL at 18:49

## 2018-03-11 RX ADMIN — BUDESONIDE AND FORMOTEROL FUMARATE DIHYDRATE 2 PUFF: 160; 4.5 AEROSOL RESPIRATORY (INHALATION) at 22:17

## 2018-03-11 RX ADMIN — MELATONIN 3 MG: 3 TAB ORAL at 22:17

## 2018-03-11 RX ADMIN — BUMETANIDE 1 MG: 1 TABLET ORAL at 09:27

## 2018-03-11 RX ADMIN — LEVOTHYROXINE SODIUM 150 MCG: 150 TABLET ORAL at 05:42

## 2018-03-11 RX ADMIN — ALPRAZOLAM 0.25 MG: 0.25 TABLET ORAL at 01:48

## 2018-03-11 RX ADMIN — HEPARIN SODIUM 5000 UNITS: 5000 INJECTION, SOLUTION INTRAVENOUS; SUBCUTANEOUS at 05:42

## 2018-03-11 RX ADMIN — Medication 1 CAPSULE: at 15:52

## 2018-03-11 RX ADMIN — METOPROLOL TARTRATE 25 MG: 25 TABLET ORAL at 09:27

## 2018-03-11 RX ADMIN — BUDESONIDE AND FORMOTEROL FUMARATE DIHYDRATE 2 PUFF: 160; 4.5 AEROSOL RESPIRATORY (INHALATION) at 09:28

## 2018-03-11 NOTE — RESPIRATORY THERAPY NOTE
Called placed to Dr Johnny Davis,  To verify D/C of CPOX  Pt SAT=99% @ 1 lpm n/c, HR=79/min  Per Dr Johnny Davis, ok to D/C CPOX

## 2018-03-11 NOTE — OCCUPATIONAL THERAPY NOTE
Occupational Therapy Progress Note     Patient Name: Swathi Stark  JEUJP'O Date: 3/11/2018  Problem List  Patient Active Problem List   Diagnosis    Kidney stone    CKD (chronic kidney disease), stage III    COPD (chronic obstructive pulmonary disease) ([facility] )    Hyperlipidemia    Essential hypertension    Type 2 diabetes mellitus (HCC)    Acute on chronic systolic CHF (congestive heart failure) (HCC)    Elevated troponin I level    Acute kidney injury (HCC)    Ureterolithiasis    Thrombocytopenia (HCC)    Leukopenia    Non-ST elevation myocardial infarction (NSTEMI), type 2 (HCC)    Severe aortic stenosis    MRSA colonization    Elevated TSH    Acute cystitis    Pulmonary hypertension    Mitral regurgitation    Aortic regurgitation    Hypothyroidism               03/11/18 0946   Restrictions/Precautions   Weight Bearing Precautions Per Order No   Other Precautions Contact/isolation; Chair Alarm;Multiple lines;O2;Telemetry; Fall Risk;Pain   Pain Assessment   Pain Assessment 0-10   Pain Score (unable to provide number )   Pain Location Back   Pain Orientation Lower   ADL   Toileting Comments pt had just utilized the [facility] with PCA; pt transferring off of BSC at start of session and in standing with RW when therapist entered the room   Bed Mobility   Additional Comments pt was standing at start of session with [facility] and seated in chair at end of session   Transfers   Stand to Sit 5  Supervision   Additional items Verbal cues; Increased time required  (RW)   Functional Mobility   Functional Mobility 4  Minimal assistance   Additional Comments pt performed FM in room with 1L O2; performed FM to the door and appeared moderately SOB; pt's SPO2 was 87%; pt requested chair at doorway and was fatigued and unable to advance forward for greater distance; pt progressed at a slow pace with cues to continue as well as directional cues during session for RW safety     Additional items Rolling walker   Cognition Overall Cognitive Status Impaired   Arousal/Participation Alert   Attention Attends with cues to redirect   Orientation Level Oriented to person;Disoriented to place; Disoriented to time;Disoriented to situation   Memory Decreased long term memory;Decreased short term memory   Following Commands Follows one step commands without difficulty   Activity Tolerance   Activity Tolerance Patient limited by fatigue;Patient limited by pain   Assessment   Assessment pt presents standing in room with RW and PCA at start of session; pt with 1L O2 throughout session and moderately SOB and unable to progress a distance greater than 20ft without a rest break; pt required chair at doorway and SpO2 was 87%; pt wheeled in place in room with (A) and required verbal and physical cues in order to perform FM tasks with RW; pt is not safe to return home at this time and will require short term rehab at discharge in order to provide safe d/c; pt is unable to care for himself at this time without increased (A)  Plan   Treatment Interventions (continued OT intervention per POC)       Pt left seated in chair at end of session; all needs within reach; all lines intact

## 2018-03-11 NOTE — PROGRESS NOTES
[facility] Internal Medicine Progress Note  Patient: Nishant Gan 80 y o  male   MRN: [id number]  PCP: Bisi Rhoades MD  Unit/Bed#: 126-50 Encounter: [id number]  Date Of Visit: 18    Assessment:    Principal Problem:    Acute on chronic systolic CHF (congestive heart failure) ([facility] )  Active Problems:    Kidney stone    CKD (chronic kidney disease), stage III    COPD (chronic obstructive pulmonary disease) ([place] [address] )    Hyperlipidemia    Essential hypertension    Type 2 diabetes mellitus (HCC)    Elevated troponin I level    Acute kidney injury ([facility] )    Ureterolithiasis    Thrombocytopenia (HCC)    Leukopenia    Non-ST elevation myocardial infarction (NSTEMI), type 2 (HCC)    Severe aortic stenosis    MRSA colonization    Elevated TSH    Acute cystitis    Pulmonary hypertension    Mitral regurgitation    Aortic regurgitation    Hypothyroidism      Plan:    · Patient is stable today on 2 L via nasal cannula, discharge with continued medical therapy, if decline recommend discharge with hospice  Changed to p o  Bumex, likely discharge     Current Length of Stay: 12 day(s)    Current Patient Status: Inpatient     Code Status: Level 1 - Full Code      Subjective:   No new issues per nursing  Objective:     Vitals:   Temp (24hrs), Av 4 °F (36 3 °C), Min:97 2 °F (36 2 °C), Max:97 6 °F (36 4 °C)    HR:  [62-68] 68  Resp:  [20-22] 20  BP: (122-141)/(60-78) 131/64  SpO2:  [91 %-99 %] 95 %  Body mass index is 28 43 kg/m²  Input and Output Summary (last 24 hours): Intake/Output Summary (Last 24 hours) at 18 1420  Last data filed at 18 1326   Gross per 24 hour   Intake              540 ml   Output              100 ml   Net              440 ml       Physical Exam:     Physical Exam   Constitutional: No distress  Pulmonary/Chest: Effort normal and breath sounds normal  No respiratory distress  He has no wheezes  Musculoskeletal: He exhibits edema     Skin: Skin is warm and dry  He is not diaphoretic  Nursing note and vitals reviewed  Additional Data:     Labs:      Results from last 7 days  Lab Units 03/10/18  0525 03/09/18  0645   WBC Thousand/uL 3 82* 4 02*   HEMOGLOBIN g/dL 9 2* 9 9*   HEMATOCRIT % 31 0* 33 3*   PLATELETS Thousands/uL 105* 118*   NEUTROS PCT %  --  52   LYMPHS PCT %  --  15   LYMPHO PCT % 15  --    MONOS PCT %  --  32*   MONO PCT MAN % 26*  --    EOS PCT %  --  1   EOSINO PCT MANUAL % 3  --        Results from last 7 days  Lab Units 03/10/18  0525  03/08/18  0515   SODIUM mmol/L 141  < > 139   POTASSIUM mmol/L 4 5  < > 4 9   CHLORIDE mmol/L 105  < > 104   CO2 mmol/L 30  < > 26   BUN mg/dL 43*  < > 50*   CREATININE mg/dL 1 41*  < > 1 66*   CALCIUM mg/dL 8 0*  < > 8 1*   TOTAL PROTEIN g/dL  --   --  6 5   BILIRUBIN TOTAL mg/dL  --   --  0 60   ALK PHOS U/L  --   --  88   ALT U/L  --   --  20   AST U/L  --   --  18   GLUCOSE RANDOM mg/dL 104  < > 103   < > = values in this interval not displayed  * I Have Reviewed All Lab Data Listed Above  * Additional Pertinent Lab Tests Reviewed:  [facility] Alaniz Admission Reviewed      Recent Cultures (last 7 days):           Last 24 Hours Medication List:     Current Facility-Administered Medications:  acetaminophen 650 mg Oral Q6H PRN Danny Fast, DO   ALPRAZolam 0 25 mg Oral BID PRN Edgar Fong PA-C   aspirin 81 mg Oral Daily Bobby Fast, DO   atorvastatin 40 mg Oral Daily With Clarence Verdin, DO   b complex-vitamin C-folic acid 1 capsule Oral Daily With Dinner Sherman Vogel PA-C   budesonide-formoterol 2 puff Inhalation BID Sherman Vogel PA-C   bumetanide 1 mg Oral BID Manan Folds, DO   fluticasone 1 spray Nasal Daily Bobby Fast, DO   heparin (porcine) 5,000 Units Subcutaneous Q8H [facility] Sherman Vogel PA-C   insulin lispro 1-6 Units Subcutaneous TID AC Sherman Vogel PA-C   insulin lispro 1-6 Units Subcutaneous HS Sherman Vogel PA-C   levalbuterol 1 25 mg Nebulization Q6H PRN Petrona Banegas, DO   levothyroxine 150 mcg Oral Early Morning Petrona Banegas, DO   melatonin 3 mg Oral HS Petrona Banegas, DO   metoprolol tartrate 25 mg Oral BID Petrona Banegas, DO   ondansetron 4 mg Intravenous Q6H PRN Sherman Vogel PA-C   sodium chloride 3 mL Nebulization Q6H PRN Petrona Banegas, DO   tamsulosin 0 4 mg Oral Daily With Dinner Sherman Vogel PA-C   traZODone 50 mg Oral HS Sherman Vogel PA-C        Today, Patient Was Seen By: Valentin Hastings DO

## 2018-03-11 NOTE — PHYSICAL THERAPY NOTE
PT Treat       03/11/18 1001   Pain Assessment   Pain Location Back   Pain Orientation Lower   Restrictions/Precautions   Weight Bearing Precautions Per Order No   Other Precautions Contact/isolation; Chair Alarm; Bed Alarm;Multiple lines;Telemetry;O2;Fall Risk;Pain   Cognition   Overall Cognitive Status Impaired   Arousal/Participation Alert   Attention Attends with cues to redirect   Orientation Level Oriented to person;Disoriented to place; Disoriented to time;Disoriented to situation   Memory Decreased recall of biographical information;Decreased short term memory   Following Commands Follows one step commands inconsistently   Bed Mobility   Additional Comments Pt standing EOB using the [employer] with nursing when PT entered  Transfers   Stand to Sit 5  Supervision   Additional items Increased time required;Verbal cues   Ambulation/Elevation   Gait pattern WNL  (Decreased tomás, velocity, and step length  )   Gait Assistance 5  Supervision   Additional items Verbal cues   Assistive Device Rolling walker   Distance 20 feet   Endurance Deficit   Endurance Deficit Yes   Endurance Deficit Description Decreased ambulation distance, and functional activity tolerance  Assessment   Assessment Pt with fair tolerance to treatment  ambulated 20 feet to the door before c/o SOB  SPO2 was 97 during ambulation  Pt was confised and disoriented throughout the PT session  Cues for safety and sequencing during ambulation  Pt would benefit from PT to help improve strength, ROM, balance, and functional activity tolerance  Plan   Treatment/Interventions Functional transfer training;LE strengthening/ROM; Elevations; Endurance training; Therapeutic exercise   Progress Progressing toward goals   Recommendation   PT - OK to Discharge No   Pt standing with nursing EOB when PT entered  Pt seated in chair with chair alarm on and call bell in reach when PT left

## 2018-03-11 NOTE — SOCIAL WORK
I received a call from the [facility] and they will have to send the case to the medical director and they will call in the am with a decision

## 2018-03-11 NOTE — PROGRESS NOTES
Progress Note - Nephrology   Swathi Stark 80 y o  male MRN: [id number]  Unit/Bed#: 411-01 Encounter: [id number]    A/P:  1  RADAMES on top of CKD kidney function is improving  2  CKD III baseline cr of 1 4 pt is at his baseline today  3  Anemia of CKD will continue to monitor   4  Hypertension currently controlled  5  CHF continue with current medication regimen       Follow up reason for today's visit: RADAMES/CKD     Acute on chronic systolic CHF (congestive heart failure) ([facility] )    Patient Active Problem List   Diagnosis    Kidney stone    CKD (chronic kidney disease), stage III    COPD [facility]ch[facility]) ([facility])    Hyperlipidemia    Essential hypertension    Type 2 diabetes mellitus (HCC)    Acute on chronic systolic CHF (congestive heart failure) (HCC)    Elevated troponin I level    Acute kidney injury (HCC)    Ureterolithiasis    Thrombocytopenia (HCC)    Leukopenia    Non-ST elevation myocardial infarction (NSTEMI), type 2 (HCC)    Severe aortic stenosis    MRSA colonization    Elevated TSH    Acute cystitis    Pulmonary hypertension    Mitral regurgitation    Aortic regurgitation    Hypothyroidism         Subjective:   Pt evaluated today resting comfortably, in no acute distress  Does yell out at times, but states he is not in pain  Denies any chest pain, n/v/d or abdominal pain  Positive lower extremity edema  Objective:     Vitals: Blood pressure 131/64, pulse 68, temperature (!) 97 3 °F (36 3 °C), temperature source Temporal, resp  rate 20, height 5' 5" (1 651 m), weight 77 5 kg (170 lb 13 7 oz), SpO2 95 %  ,Body mass index is 28 43 kg/m²      Weight (last 2 days)     Date/Time   Weight    03/11/18 0600  77 5 (170 86)    03/10/18 0600  77 9 (171 74)    03/09/18 0600  78 1 (172 18)                Intake/Output Summary (Last 24 hours) at 03/11/18 1304  Last data filed at 03/11/18 0833   Gross per 24 hour   Intake              480 ml   Output              160 ml   Net 320 ml     I/O last 3 completed shifts: In: 840 [P O :840]  Out: 56 [Urine:610]         Physical Exam: /64 (BP Location: Right arm)   Pulse 68   Temp (!) 97 3 °F (36 3 °C) (Temporal)   Resp 20   Ht 5' 5" (1 651 m)   Wt 77 5 kg (170 lb 13 7 oz)   SpO2 95%   BMI 28 43 kg/m²     General Appearance:    Alert, no distress, appears stated age   Head:    Normocephalic, without obvious abnormality, atraumatic   Eyes:    Conjunctiva/corneas clear   Ears:    Normal external ears   Nose:   Nares normal, septum midline, mucosa normal, no drainage    or sinus tenderness   Throat:   Lips, mucosa, and tongue normal; teeth and gums normal   Neck:   Supple, symmetrical, trachea midline, no adenopathy;        thyroid:  No enlargement/tenderness/nodules; no carotid    bruit or JVD   Back:     Symmetric, no curvature, ROM normal, no CVA tenderness   Lungs:     Decreased throughout , respirations unlabored   Chest wall:    No tenderness or deformity   Heart:    Regular rate and rhythm, S1 and S2 normal, no murmur, rub   or gallop   Abdomen:     Soft, non-tender, bowel sounds active   Extremities:   Extremities normal, atraumatic, positive edema b/l lower ext +2    Skin:   Skin color, texture, turgor normal, no rashes or lesions   Lymph nodes:   Cervical normal   Neurologic:   CNII-XII intact            Lab, Imaging and other studies: I have personally reviewed pertinent labs  CBC: No results found for: WBC, HGB, HCT, MCV, PLT, ADJUSTEDWBC, MCH, MCHC, RDW, MPV, NRBC  CMP: No results found for: NA, K, CL, CO2, ANIONGAP, BUN, CREATININE, GLUCOSE, CALCIUM, AST, ALT, ALKPHOS, PROT, ALBUMIN, BILITOT, EGFR        Results from last 7 days  Lab Units 03/10/18  0525 03/09/18  0645 03/08/18  0515  03/05/18  0436   SODIUM mmol/L 141 139 139  < > 138   POTASSIUM mmol/L 4 5 5 0 4 9  < > 5 3   CHLORIDE mmol/L 105 105 104  < > 105   CO2 mmol/L 30 28 26  < > 24   BUN mg/dL 43* 47* 50*  < > 53*   CREATININE mg/dL 1 41* 1 63* 1 66*  < > 1 84*   CALCIUM mg/dL 8 0* 8 1* 8 1*  < > 8 2*   TOTAL PROTEIN g/dL  --   --  6 5  --  6 5   BILIRUBIN TOTAL mg/dL  --   --  0 60  --  0 40   ALK PHOS U/L  --   --  88  --  102   ALT U/L  --   --  20  --  21   AST U/L  --   --  18  --  22   GLUCOSE RANDOM mg/dL 104 117 103  < > 108   < > = values in this interval not displayed  Phosphorus: No results found for: PHOS  Magnesium: No results found for: MG  Urinalysis: No results found for: [place] Fraction, SPECGRAV, PHUR, LEUKOCYTESUR, NITRITE, PROTEINUA, GLUCOSEU, KETONESU, BILIRUBINUR, BLOODU  Ionized Calcium: No results found for: CAION  Coagulation: No results found for: PT, INR, APTT  Troponin: No results found for: TROPONINI  ABG: No results found for: PHART, JEY8DGW, PO2ART, SER7LMH, H1CUJJNU, BEART, SOURCE  Radiology review:     IMAGING  Procedure: Xr Chest Pa & Lateral    Result Date: 3/9/2018  Narrative: CHEST INDICATION:  Shortness of breath  COMPARISON:  Chest x-ray from 3/8/2018 EXAM PERFORMED/VIEWS:  XR CHEST PA & LATERAL FINDINGS:  AICD/pacemaker is noted, leads are intact  There is stable cardiomegaly  The central congestive changes have partially improved compared to the prior examination  There are persistent bibasilar patchy densities which may be related to atelectasis or pneumonia  There is likely a small left pleural effusion, stable  Osseous structures appear within normal limits for patient age  Impression: The central congestive changes have partially improved compared to the prior examination  Persistent bibasilar patchy densities and probable small left pleural effusion   Workstation performed: [id number]     Procedure: Ct Chest Wo Contrast    Result Date: 3/9/2018  Narrative: CT CHEST WITHOUT IV CONTRAST INDICATION:  68-year-old male,  Shortness of breath COMPARISON: 3/9/2018 chest x-ray TECHNIQUE: CT examination of the chest was performed without intravenous contrast   Axial, sagittal, and coronal 2D reformatted images were created from the source data and submitted for interpretation  Radiation dose length product (DLP) for this visit:  309 mGy-cm   This examination, like all CT scans performed in the [facility], was performed utilizing techniques to minimize radiation dose exposure, including the use of iterative reconstruction and automated exposure control  FINDINGS: LUNGS: Bibasal consolidation, likely compressive atelectasis, although pneumonia could appear similarly  Consistent with chest x-ray  PLEURA:  Moderate to large right pleural effusion  Small left pleural effusion  HEART/GREAT VESSELS:  Small pericardial effusion  Mild to moderate cardiomegaly  MEDIASTINUM AND THAD:  Unremarkable  CHEST WALL AND LOWER NECK:  Left chest wall cardiac pacer VISUALIZED STRUCTURES IN THE UPPER ABDOMEN:  Substantial Perihepatic and perisplenic ascites  OSSEOUS STRUCTURES:  No acute fracture or destructive osseous lesion  Impression: Moderate to large what right pleural effusion, small left pleural effusion, bibasal consolidation, consistent with chest x-ray   Mild-to-moderate cardiomegaly, small pericardial effusion Substantial perihepatic and perisplenic ascites Workstation performed: [id number]       Current Facility-Administered Medications   Medication Dose Route Frequency    acetaminophen (TYLENOL) tablet 650 mg  650 mg Oral Q6H PRN    ALPRAZolam (XANAX) tablet 0 25 mg  0 25 mg Oral BID PRN    aspirin chewable tablet 81 mg  81 mg Oral Daily    atorvastatin (LIPITOR) tablet 40 mg  40 mg Oral Daily With Dinner    b complex-vitamin C-folic acid (NEPHROCAPS) capsule 1 capsule  1 capsule Oral Daily With Dinner    budesonide-formoterol (SYMBICORT) 160-4 5 mcg/act inhaler 2 puff  2 puff Inhalation BID    bumetanide (BUMEX) tablet 1 mg  1 mg Oral BID    fluticasone (FLONASE) 50 mcg/act nasal spray 1 spray  1 spray Nasal Daily    heparin (porcine) subcutaneous injection 5,000 Units  5,000 Units Subcutaneous Q8H [address]    insulin lispro (HumaLOG) 100 units/mL subcutaneous injection 1-6 Units  1-6 Units Subcutaneous TID AC    insulin lispro (HumaLOG) 100 units/mL subcutaneous injection 1-6 Units  1-6 Units Subcutaneous HS    levalbuterol (XOPENEX) inhalation solution 1 25 mg  1 25 mg Nebulization Q6H PRN    levothyroxine tablet 150 mcg  150 mcg Oral Early Morning    melatonin tablet 3 mg  3 mg Oral HS    metoprolol tartrate (LOPRESSOR) tablet 25 mg  25 mg Oral BID    ondansetron (ZOFRAN) injection 4 mg  4 mg Intravenous Q6H PRN    sodium chloride 0 9 % inhalation solution 3 mL  3 mL Nebulization Q6H PRN    tamsulosin (FLOMAX) capsule 0 4 mg  0 4 mg Oral Daily With Dinner    traZODone (DESYREL) tablet 50 mg  50 mg Oral HS     Medications Discontinued During This Encounter   Medication Reason    aspirin 81 MG tablet     metoprolol tartrate (LOPRESSOR) 50 mg tablet Dose adjustment    simvastatin (ZOCOR) 40 mg tablet Dose adjustment    albuterol (PROVENTIL HFA,VENTOLIN HFA) 90 mcg/act inhaler     allopurinol (ZYLOPRIM) 100 mg tablet     Calcium Carbonate (CALCIUM 600 HIGH POTENCY PO)     DULoxetine (CYMBALTA) 30 mg delayed release capsule     fluticasone-salmeterol (ADVAIR) 250-50 mcg/dose inhaler     furosemide (LASIX) 40 mg tablet     glipiZIDE (GLUCOTROL) 5 mg tablet     indomethacin (INDOCIN) 50 mg capsule     IRON PO     Linagliptin (TRADJENTA) 5 MG TABS     losartan (COZAAR) 50 mg tablet     metolazone (ZAROXOLYN) 5 mg tablet     Misc Natural Products (TART CHERRY ADVANCED PO)     Nutritional Supplements (VITAMIN D BOOSTER PO)     oxyCODONE-acetaminophen (PERCOCET) 5-325 mg per tablet     potassium chloride (MICRO-K) 10 MEQ CR capsule     POTASSIUM GLUCONATE PO     vitamin E, tocopherol, 400 units capsule     sodium chloride 0 9 % infusion     bumetanide (BUMEX) tablet 1 mg     finasteride (PROSCAR) tablet 5 mg     hydrOXYzine HCL (ATARAX) tablet 25 mg     zolpidem (AMBIEN) tablet 5 mg     fluticasone (FLONASE) 50 mcg/act nasal spray 2 spray     acetaminophen (TYLENOL) tablet 650 mg     aspirin tablet 325 mg     pantoprazole (PROTONIX) EC tablet 20 mg     pravastatin (PRAVACHOL) tablet 40 mg     cefTRIAXone (ROCEPHIN) IVPB (premix) 1,000 mg     levothyroxine tablet 125 mcg     sodium chloride 0 9 % infusion     cefTRIAXone (ROCEPHIN) IVPB (premix) 1,000 mg     hydrOXYzine HCL (ATARAX) tablet 25 mg     furosemide (LASIX) injection 40 mg     furosemide (LASIX) injection 40 mg        Loralie YANA Betancourt

## 2018-03-12 VITALS
BODY MASS INDEX: 29.05 KG/M2 | SYSTOLIC BLOOD PRESSURE: 134 MMHG | RESPIRATION RATE: 18 BRPM | HEART RATE: 64 BPM | DIASTOLIC BLOOD PRESSURE: 67 MMHG | HEIGHT: 65 IN | WEIGHT: 174.38 LBS | TEMPERATURE: 98.4 F | OXYGEN SATURATION: 96 %

## 2018-03-12 PROBLEM — D69.6 THROMBOCYTOPENIA (HCC): Status: RESOLVED | Noted: 2018-02-27 | Resolved: 2018-03-12

## 2018-03-12 PROBLEM — I21.A1 NON-ST ELEVATION MYOCARDIAL INFARCTION (NSTEMI), TYPE 2: Status: RESOLVED | Noted: 2018-02-27 | Resolved: 2018-03-12

## 2018-03-12 PROBLEM — N30.00 ACUTE CYSTITIS: Status: RESOLVED | Noted: 2018-03-03 | Resolved: 2018-03-12

## 2018-03-12 PROBLEM — Z22.322 MRSA COLONIZATION: Status: RESOLVED | Noted: 2018-03-03 | Resolved: 2018-03-12

## 2018-03-12 PROBLEM — D72.819 LEUKOPENIA: Status: RESOLVED | Noted: 2018-02-27 | Resolved: 2018-03-12

## 2018-03-12 PROBLEM — R77.8 ELEVATED TROPONIN I LEVEL: Status: RESOLVED | Noted: 2018-02-27 | Resolved: 2018-03-12

## 2018-03-12 PROBLEM — I50.23 ACUTE ON CHRONIC SYSTOLIC CHF (CONGESTIVE HEART FAILURE) (HCC): Status: RESOLVED | Noted: 2018-02-27 | Resolved: 2018-03-12

## 2018-03-12 PROBLEM — N17.9 ACUTE KIDNEY INJURY (HCC): Status: RESOLVED | Noted: 2018-02-27 | Resolved: 2018-03-12

## 2018-03-12 PROBLEM — R79.89 ELEVATED TSH: Status: RESOLVED | Noted: 2018-03-03 | Resolved: 2018-03-12

## 2018-03-12 PROBLEM — N20.0 KIDNEY STONE: Status: RESOLVED | Noted: 2018-02-27 | Resolved: 2018-03-12

## 2018-03-12 PROBLEM — N20.1 URETEROLITHIASIS: Status: RESOLVED | Noted: 2018-02-27 | Resolved: 2018-03-12

## 2018-03-12 LAB
GLUCOSE SERPL-MCNC: 138 MG/DL (ref 65–140)
GLUCOSE SERPL-MCNC: 150 MG/DL (ref 65–140)

## 2018-03-12 PROCEDURE — 82948 REAGENT STRIP/BLOOD GLUCOSE: CPT

## 2018-03-12 PROCEDURE — 92526 ORAL FUNCTION THERAPY: CPT | Performed by: SPEECH-LANGUAGE PATHOLOGIST

## 2018-03-12 PROCEDURE — G8998 SWALLOW D/C STATUS: HCPCS | Performed by: SPEECH-LANGUAGE PATHOLOGIST

## 2018-03-12 PROCEDURE — 99239 HOSP IP/OBS DSCHRG MGMT >30: CPT | Performed by: INTERNAL MEDICINE

## 2018-03-12 RX ORDER — LANOLIN ALCOHOL/MO/W.PET/CERES
3 CREAM (GRAM) TOPICAL
Refills: 0
Start: 2018-03-12

## 2018-03-12 RX ORDER — ATORVASTATIN CALCIUM 40 MG/1
40 TABLET, FILM COATED ORAL
Refills: 0
Start: 2018-03-12

## 2018-03-12 RX ORDER — LEVOTHYROXINE SODIUM 0.15 MG/1
150 TABLET ORAL
Refills: 0
Start: 2018-03-13

## 2018-03-12 RX ORDER — ASPIRIN 81 MG/1
81 TABLET, CHEWABLE ORAL DAILY
Refills: 0
Start: 2018-03-13

## 2018-03-12 RX ADMIN — FLUTICASONE PROPIONATE 1 SPRAY: 50 SPRAY, METERED NASAL at 09:40

## 2018-03-12 RX ADMIN — LEVOTHYROXINE SODIUM 150 MCG: 150 TABLET ORAL at 05:41

## 2018-03-12 RX ADMIN — METOPROLOL TARTRATE 25 MG: 25 TABLET ORAL at 09:40

## 2018-03-12 RX ADMIN — BUMETANIDE 1 MG: 1 TABLET ORAL at 09:40

## 2018-03-12 RX ADMIN — ALPRAZOLAM 0.25 MG: 0.25 TABLET ORAL at 02:42

## 2018-03-12 RX ADMIN — ACETAMINOPHEN 650 MG: 325 TABLET, FILM COATED ORAL at 12:16

## 2018-03-12 RX ADMIN — INSULIN LISPRO 1 UNITS: 100 INJECTION, SOLUTION INTRAVENOUS; SUBCUTANEOUS at 09:40

## 2018-03-12 RX ADMIN — ASPIRIN 81 MG 81 MG: 81 TABLET ORAL at 09:41

## 2018-03-12 RX ADMIN — ACETAMINOPHEN 650 MG: 325 TABLET, FILM COATED ORAL at 05:41

## 2018-03-12 RX ADMIN — BUDESONIDE AND FORMOTEROL FUMARATE DIHYDRATE 2 PUFF: 160; 4.5 AEROSOL RESPIRATORY (INHALATION) at 09:40

## 2018-03-12 RX ADMIN — HEPARIN SODIUM 5000 UNITS: 5000 INJECTION, SOLUTION INTRAVENOUS; SUBCUTANEOUS at 05:41

## 2018-03-12 NOTE — SOCIAL WORK
The patient is approved togo to the skilled nursing by [facility] I spoke with Fay Reyna at the [facility] the [facility] is [id number] the [facility] is for start of care is March 12-March 15th and the date for review is march 15th  minutes of therapy minutes a week  I called and I left a message for Tammy and I spoke with the patients daughter in length about the d/c plan and she is agreeable

## 2018-03-12 NOTE — PLAN OF CARE
Problem: DISCHARGE PLANNING - CARE MANAGEMENT  Goal: Discharge to post-acute care or home with appropriate resources  INTERVENTIONS:  - Conduct assessment to determine patient/family and health care team treatment goals, and need for post-acute services based on payer coverage, community resources, and patient preferences, and barriers to discharge  - Address psychosocial, clinical, and financial barriers to discharge as identified in assessment in conjunction with the patient/family and health care team  - Arrange appropriate level of post-acute services according to patient's   needs and preference and payer coverage in collaboration with the physician and health care team  - Communicate with and update the patient/family, physician, and health care team regarding progress on the discharge plan  - Arrange appropriate transportation to post-acute venues  Outcome: Completed Date Met: 03/12/18  -dc to [facility]  -[facility] to transport pt at 12pm

## 2018-03-12 NOTE — NURSING NOTE
Patient transported to [facility] via [address] with belongings  Patient's daughter, Callie Gage, aware of transport  Report called to Bhavik Madison at [facility]  Patient offered no complaints at time of d/c

## 2018-03-12 NOTE — PLAN OF CARE
Problem: SLP ADULT - SWALLOWING, IMPAIRED  Goal: Advance to least restrictive diet without signs or symptoms of aspiration for planned discharge setting  See evaluation for individualized goals  Patient will:    1  Tolerate Level 2 mechanical soft and thin liquids with no S/S of oral/pharyngeal dysphagia across meals           Outcome: Completed Date Met: 03/12/18

## 2018-03-12 NOTE — SOCIAL WORK
Received fax from Clinical services at [facility] with authorization for ambulance transport  CM faxed over to [facility]

## 2018-03-12 NOTE — SPEECH THERAPY NOTE
Speech Language/Pathology    Speech/Language Pathology Progress Note    Patient Name: Morales CUMMINGSJ Date: 3/12/2018     Problem List  Patient Active Problem List   Diagnosis    CKD (chronic kidney disease), stage III    COPD [facility]chronic obstructive [facility]) [facility] )    Hyperlipidemia    Essential hypertension    Type 2 diabetes mellitus ([facility] )    Severe aortic stenosis    Pulmonary hypertension    Mitral regurgitation    Aortic regurgitation    Hypothyroidism        Past Medical History  Past Medical History:   Diagnosis Date    AICD (automatic cardioverter/defibrillator) present     Aortic stenosis     Arthritis     [facility]     Constipation     Dependent on walker for ambulation     Diabetes mellitus (HCC)     NIDDM - on no meds presently    Hyperlipidemia     Hypertension     Lumbar back pain     Lumbar spondylosis     Myocardial infarction     MI X2    Stage 3a chronic kidney disease     Wears glasses         Past Surgical History  Past Surgical History:   Procedure Laterality Date    CARDIAC DEFIBRILLATOR PLACEMENT      CARDIAC DEFIBRILLATOR PLACEMENT      CARPAL TUNNEL RELEASE      CATARACT EXTRACTION Bilateral     HERNIA REPAIR      RHIZOTOMY Right 3/16/2017    Procedure: RHIZOTOMY LUMBAR,RADIO FREQUENCY LESIONING L3-4 L4-5 L5-S1;  Surgeon: Juli Dean MD;  Location: [facility];  Service:          Subjective:  Patient awake OOB in chair  Oriented, cooperative  Objective:  Nursing states patient has had no s/s reported over the weekend  Assessment:  Patient is without s/s with thin liquids  He had soft food trial without difficulty or s/s  Able to feed himself; but requires some help with set up  Plan/Recommendations:  Goal met; patient without s/s with current diet level  Discharge from this service

## 2018-03-12 NOTE — DISCHARGE SUMMARY
Discharge Summary - [address] [facility]    Patient Information: Steve Andrews 80 y o  male MRN: [id number]  Unit/Bed#: 411-01 Encounter: [id number]    Discharging Physician / Practitioner: Viky Orellana DO  PCP: David Crews MD  Admission Date: 2/27/2018  Discharge Date: 03/12/18    Disposition:     Other: [address] [place] [address]    Reason for Admission: Steve Andrews is a 80 y o  male who presents to the emergency room from [address] [place] [address] with episodes of nausea vomiting and diarrhea yesterday  Nursing home staff was concerned about abdominal distention  He had no history of fever dizziness, lightheadedness  He denies chest pain and shortness of breath  Labs showed BUN of 53, creatinine of 1 99, glucose of 147, lipase of 69, troponin 00 05, WBC of 4 03, hemoglobin of 11 3, UA shows 4-10 WBCs with occasional bacteria  Chest x-ray shows cardiomegaly and pulmonary vascular congestion  CT shows bilateral pleural effusions right greater than left  In the emergency room patient appears comfortable has no specific complaint other than wanting something to drink  The emergency room attending contacted Urology who confirm that they will see patient tomorrow morning  Patient has been admitted on inpatient status med/surg level of care for further workup and management, of ureterolithiasis, elevated troponin, acute kidney injury         Discharge Diagnoses:     Principal Problem (Resolved):    Acute on chronic systolic CHF (congestive heart failure) ([facility])  Active Problems:    CKD (chronic kidney disease), stage III    COPD (chronic obstructive pulmonary disease) ([facility])    Hyperlipidemia    Essential hypertension    Type 2 diabetes mellitus ([facility])    Severe aortic stenosis    Pulmonary hypertension    Mitral regurgitation    Aortic regurgitation    Hypothyroidism  Resolved Problems:    Kidney stone    Abdominal pain    Elevated troponin I level    Acute kidney injury ([facility] ) Ureterolithiasis    Thrombocytopenia (HCC)    Leukopenia    Non-ST elevation myocardial infarction (NSTEMI), type 2 (HCC)    MRSA colonization    Elevated TSH    Acute cystitis      Consultations During Hospital Stay:  · Urology  · Cardiology  · Nephrology      Significant Findings / Test Results:   CT abdomen pelvis dated 2/27/2018  · 6 mm calculus in distal left ureter, resulting in mild left-sided hydroureter but no significant hydronephrosis  Ultrasound retroperitoneum dated 2/20/2018    No hydronephrosis noted  Incidental Findings:   · None     Test Results Pending at Discharge (will require follow up): · None     Outpatient Tests Requested:  · None      Hospital Course:       Isaiah Duarte is a 80 y o  male patient who originally presented to the hospital on 2/27/2018 due to abdominal pain with nausea vomiting and diarrhea  Patient's hospital stay complicated by volume overload in the setting of obstructing ureteral stone requiring IV fluid hydration, underlying valvular heart disease/chronic combined systolic and diastolic CHF  Patient initially was admitted with abdominal pain, found to have obstructing ureteral stone, patient was treated with medical management only without stone extraction due to underlying severe heart disease, severe aortic stenosis, combined systolic and diastolic CHF  Patient did receive IV fluid given his acute kidney injury as well as obstructing ureteral stone, patient then developed volume overload requiring IV diuretics  Patient overall with poor prognosis, does have end-stage heart disease, is not a good candidate for any surgical intervention, without surgical intervention patient is appropriate for outpatient hospice      This was discussed with the patient's family during the hospital stay, they would be willing to pursue hospice, at this time patient is being discharged to acute short-term rehab facility for physical therapy prior to return home               Condition at Discharge: stable     Discharge Day Visit / Exam:     Subjective:  Patient has no acute complaints  Vitals: Blood Pressure: 134/67 (03/12/18 0830)  Pulse: 64 (03/12/18 0830)  Temperature: 98 4 °F (36 9 °C) (03/12/18 0830)  Temp Source: Temporal (03/12/18 0830)  Respirations: 18 (03/12/18 0830)  Height: 5' 5" (165 1 cm) (02/27/18 1256)  Weight - Scale: 79 1 kg (174 lb 6 1 oz) (03/12/18 0600)  SpO2: 96 % (03/12/18 0830)  Exam:   Physical Exam   Constitutional: He is oriented to person, place, and time  Pulmonary/Chest: Effort normal    Abdominal: Soft  Bowel sounds are normal  He exhibits no distension  There is no tenderness  Neurological: He is alert and oriented to person, place, and time  No cranial nerve deficit  Coordination normal    Psychiatric: He has a normal mood and affect  His behavior is normal  Judgment and thought content normal    Nursing note and vitals reviewed  Discharge instructions/Information to patient and family:   See after visit summary for information provided to patient and family  Provisions for Follow-Up Care:  See after visit summary for information related to follow-up care and any pertinent home health orders  Planned Readmission: no     Discharge Statement:  I spent 35 minutes discharging the patient  This time was spent on the day of discharge  I had direct contact with the patient on the day of discharge  Greater than 50% of the total time was spent examining patient, answering all patient questions, arranging and discussing plan of care with patient as well as directly providing post-discharge instructions  Additional time then spent on discharge activities  Discharge Medications:  See after visit summary for reconciled discharge medications provided to patient and family        ** Please Note: This note has been constructed using a voice recognition system **

## 2018-03-12 NOTE — PROGRESS NOTES
Progress Note - Nephrology   Roberta Mendez 80 y o  male MRN: [id number]  Unit/Bed#: 411-01 Encounter: [id number]    A/P:  1  RADAMES on top of CKD               3/7: Renal function stable, however would benefit from increased diuresis              3/8: Diuresing  With a decline in weight and improved aeration and no significant change in renal function              3/9: kidney function slightly better  Continues to diurese  3/12: Renal function at baseline this AM   2  CKD III with baseline Cr ~1 4 mg/dL  3  Hypernatremia -resolved  4  HTN + CKD + CHF -patient appears compensated at this time  5  Cystitis -resolved  6  Nephrolithiasis with mild right sided hydroureteronephrosis              No intervention from Urology at this time, we will continue to treat medically  7  Severe pulmonary HTN  8  Mod to severe mitral regurgitation  9  Hyperkalemia - resolved      Follow up reason for today's visit: RADAMES/CKD    Acute on chronic systolic CHF (congestive heart failure) ([facility] [address] )    Patient Active Problem List   Diagnosis    Kidney stone    CKD (chronic kidney disease), stage III    [facility]    Hyperlipidemia    Essential hypertension    Type 2 diabetes mellitus (HCC)    Acute on chronic systolic CHF (congestive heart failure) ([facility])    Elevated troponin I level    Acute kidney injury (HCC)    Ureterolithiasis    Thrombocytopenia (HCC)    Leukopenia    Non-ST elevation myocardial infarction (NSTEMI), type 2 (HCC)    Severe aortic stenosis    MRSA colonization    Elevated TSH    Acute cystitis    Pulmonary hypertension    Mitral regurgitation    Aortic regurgitation    Hypothyroidism         Subjective:     Patient without acute events overnight  Objective:     Vitals: Blood pressure 134/67, pulse 64, temperature 98 4 °F (36 9 °C), temperature source Temporal, resp  rate 18, height 5' 5" (1 651 m), weight 79 1 kg (174 lb 6 1 oz), SpO2 96 %  ,Body mass index is 29 02 kg/m²  Weight (last 2 days)     Date/Time   Weight    03/12/18 0600  79 1 (174 38)    03/11/18 0600  77 5 (170 86)    03/10/18 0600  77 9 (171 74)                Intake/Output Summary (Last 24 hours) at 03/12/18 0835  Last data filed at 03/12/18 0416   Gross per 24 hour   Intake              360 ml   Output              250 ml   Net              110 ml     I/O last 3 completed shifts: In: 80 [P O :780]  Out: 350 [Urine:350]         Physical Exam: /67 (BP Location: Left arm)   Pulse 64   Temp 98 4 °F (36 9 °C) (Temporal)   Resp 18   Ht 5' 5" (1 651 m)   Wt 79 1 kg (174 lb 6 1 oz)   SpO2 96%   BMI 29 02 kg/m²     General Appearance:    Sleepy but arouseable, appears stated age   Head:    Normocephalic, without obvious abnormality, atraumatic   Eyes:    Conjunctiva/corneas clear   Ears:    Normal external ears   Nose:   Nares normal, septum midline, mucosa normal, no drainage    or sinus tenderness   Throat:   Lips, mucosa, and tongue normal; teeth and gums normal   Neck:   Supple   Back:     Symmetric, no curvature, ROM normal, no CVA tenderness   Lungs:     Decreased BS bilaterally, no crackles appreciated   Chest wall:    No tenderness or deformity   Heart:    Regular rate and rhythm, S1 and S2 normal, no murmur, rub   or gallop   Abdomen:     Soft, non-tender, bowel sounds active   Extremities:   Extremities normal, atraumatic, no cyanosis, +2 edema bilaterally   Skin:   Skin color, texture, turgor normal, no rashes or lesions   Lymph nodes:   Cervical normal   Neurologic:   CNII-XII intact            Lab, Imaging and other studies: I have personally reviewed pertinent labs  CBC:   No results found for: WBC, HGB, HCT, MCV, PLT, ADJUSTEDWBC, MCH, MCHC, RDW, MPV, NRBC  CMP:   No results found for: NA, K, CL, CO2, ANIONGAP, BUN, CREATININE, GLUCOSE, CALCIUM, AST, ALT, ALKPHOS, PROT, ALBUMIN, BILITOT, EGFR          Results from last 7 days  Lab Units 03/10/18  0525 03/09/18  2853 03/08/18  0515   SODIUM mmol/L 141 139 139   POTASSIUM mmol/L 4 5 5 0 4 9   CHLORIDE mmol/L 105 105 104   CO2 mmol/L 30 28 26   BUN mg/dL 43* 47* 50*   CREATININE mg/dL 1 41* 1 63* 1 66*   CALCIUM mg/dL 8 0* 8 1* 8 1*   TOTAL PROTEIN g/dL  --   --  6 5   BILIRUBIN TOTAL mg/dL  --   --  0 60   ALK PHOS U/L  --   --  88   ALT U/L  --   --  20   AST U/L  --   --  18   GLUCOSE RANDOM mg/dL 104 117 103         Phosphorus:   No results found for: PHOS  Magnesium:   No results found for: MG  Urinalysis:   No results found for: COLORU, CLARITYU, SPECGRAV, PHUR, LEUKOCYTESUR, NITRITE, PROTEINUA, GLUCOSEU, KETONESU, BILIRUBINUR, BLOODU  Ionized Calcium: No results found for: CAION  Coagulation:   No results found for: PT, INR, APTT  Troponin:   No results found for: TROPONINI  ABG: No results found for: PHART, VTF4GVG, PO2ART, XIV0HBG, B8AUNXIJ, BEART, SOURCE  Radiology review:     IMAGING  Procedure: Ct Abdomen Pelvis Wo Contrast    Result Date: 2/27/2018  Narrative: CT ABDOMEN AND PELVIS WITHOUT IV CONTRAST INDICATION: diarhea abdm distention  History taken directly from the electronic ordering system  COMPARISON: None  TECHNIQUE:  CT examination of the abdomen and pelvis was performed without intravenous contrast   Axial, sagittal, and coronal 2D reformatted images were created from the source data and submitted for interpretation  Radiation dose length product (DLP) for this visit:  721 9 mGy-cm   This examination, like all CT scans performed in the [facility], was performed utilizing techniques to minimize radiation dose exposure, including the use of iterative reconstruction and automated exposure control  Enteric contrast was not administered  FINDINGS: ABDOMEN LOWER CHEST:  Bilateral pleural effusions, right greater than left  Atelectatic versus consolidative changes at the left base  LIVER/BILIARY TREE:  Within limitations of noncontrast, no focal lesion  GALLBLADDER:  No calcified gallstones   No pericholecystic inflammatory change  SPLEEN:  Unremarkable  PANCREAS:  Much of the pancreas is atrophic  ADRENAL GLANDS:  Unremarkable  KIDNEYS/URETERS:  Simple appearing left renal cysts  6 mm catheter in the distal left ureter just proximal to the UVJ  Mild left hydroureter without substantial hydronephrosis  Punctate calculus in the superior pole of the left kidney  STOMACH AND BOWEL:  No evidence for bowel obstruction  Diverticulosis  No clear evidence for diverticulitis or colitis  APPENDIX:  No findings to suggest appendicitis  ABDOMINOPELVIC CAVITY:  Mild abdominal ascites  Scattered lymph nodes in the retroperitoneum which are nonspecific  VESSELS:  Atherosclerotic changes are present  No evidence of aneurysm  PELVIS REPRODUCTIVE ORGANS:  Prostatomegaly URINARY BLADDER:  Bladder is slightly thick-walled  Correlate with UA for cystitis in the possibility of chronic bladder outlet obstruction  ABDOMINAL WALL/INGUINAL REGIONS:  Anasarca  OSSEOUS STRUCTURES:  No acute fracture or destructive osseous lesion  Impression: 6 mm calculus in the distal left ureter just proximal to the UVJ resulting in mild left-sided hydroureter but no significant hydronephrosis  Bilateral pleural effusions, right greater than left with atelectatic versus consolidative change at the left base  Thick-walled bladder  Could reflect cystitis versus sequela chronic outlet obstruction  Mild abdominal ascites  Workstation performed: [id number]     Procedure: Xr Chest 1 View Portable    Result Date: 2/27/2018  Narrative: CHEST INDICATION:  Shortness of breath  diarrhea COMPARISON:  October 4, 2017  EXAM PERFORMED/VIEWS:  XR CHEST PORTABLE FINDINGS: Heart enlarged  ICD present  Pulmonary vessels prominent and indistinct  Opacification in the lower left hemithorax, probably a combination of pleural effusion and atelectasis or consolidation in the left lower lobe  Right lung clear  No pneumothorax   Osseous structures appear within normal limits for patient age  Impression: 1  Cardiomegaly and pulmonary vascular congestion  2   Probable small left pleural effusion and atelectasis or consolidation in the left lower lobe  Workstation performed: [id number]     Procedure: Us Kidney And Bladder    Result Date: 2/28/2018  Narrative: RENAL ULTRASOUND INDICATION: RENAL FAILURE, ACUTE RENAL FAILURE, CHRONIC arf, crf COMPARISON: CT 2/27/2018 TECHNIQUE:   Ultrasound of the retroperitoneum was performed with a curvilinear transducer utilizing volumetric sweeps and still imaging techniques  FINDINGS: KIDNEYS: Symmetric and normal size  Right kidney:  9 6 cm  Left kidney:  9 8 cm  Right kidney The renal parenchyma is diffusely echogenic consistent with medical renal disease  No suspicious masses detected  No hydronephrosis  No shadowing calculi  No perinephric fluid collections  Left kidney The renal parenchyma is diffusely echogenic consistent with medical renal disease  No suspicious masses detected  Left renal simple cysts  No hydronephrosis  No shadowing calculi  No perinephric fluid collections  URETERS: Nonvisualized  BLADDER: Diffuse bladder wall thickening could relate to cystitis or outlet obstruction changes, correlate clinically  4 7 x 2 9 x 3 6 cm prostate protrudes into the base of the bladder  Small amount of ascites noted in the left upper abdominal quadrant as on CT  Impression: 1  Bilateral medical renal disease  2   No hydronephrosis  3   Left renal cysts  4   Diffuse bladder wall thickening could relate to cystitis or outlet obstruction changes, correlate clinically  4 7 x 2 9 x 3 6 cm prostate protrudes into the base of the bladder   Workstation performed: [id number]       Current Facility-Administered Medications   Medication Dose Route Frequency    acetaminophen (TYLENOL) tablet 650 mg  650 mg Oral Q6H PRN    ALPRAZolam (XANAX) tablet 0 25 mg  0 25 mg Oral BID PRN    aspirin chewable tablet 81 mg  81 mg Oral Daily    atorvastatin (LIPITOR) tablet 40 mg  40 mg Oral Daily With Dinner    b complex-vitamin C-folic acid (NEPHROCAPS) capsule 1 capsule  1 capsule Oral Daily With Dinner    budesonide-formoterol (SYMBICORT) 160-4 5 mcg/act inhaler 2 puff  2 puff Inhalation BID    bumetanide (BUMEX) tablet 1 mg  1 mg Oral BID    fluticasone (FLONASE) 50 mcg/act nasal spray 1 spray  1 spray Nasal Daily    heparin (porcine) subcutaneous injection 5,000 Units  5,000 Units Subcutaneous Q8H [facility]    insulin lispro (HumaLOG) 100 units/mL subcutaneous injection 1-6 Units  1-6 Units Subcutaneous TID AC    insulin lispro (HumaLOG) 100 units/mL subcutaneous injection 1-6 Units  1-6 Units Subcutaneous HS    levalbuterol (XOPENEX) inhalation solution 1 25 mg  1 25 mg Nebulization Q6H PRN    levothyroxine tablet 150 mcg  150 mcg Oral Early Morning    melatonin tablet 3 mg  3 mg Oral HS    metoprolol tartrate (LOPRESSOR) tablet 25 mg  25 mg Oral BID    ondansetron (ZOFRAN) injection 4 mg  4 mg Intravenous Q6H PRN    sodium chloride 0 9 % inhalation solution 3 mL  3 mL Nebulization Q6H PRN    tamsulosin (FLOMAX) capsule 0 4 mg  0 4 mg Oral Daily With Dinner    traZODone (DESYREL) tablet 50 mg  50 mg Oral HS     Medications Discontinued During This Encounter   Medication Reason    aspirin 81 MG tablet     metoprolol tartrate (LOPRESSOR) 50 mg tablet Dose adjustment    simvastatin (ZOCOR) 40 mg tablet Dose adjustment    albuterol (PROVENTIL HFA,VENTOLIN HFA) 90 mcg/act inhaler     allopurinol (ZYLOPRIM) 100 mg tablet     Calcium Carbonate (CALCIUM 600 HIGH POTENCY PO)     DULoxetine (CYMBALTA) 30 mg delayed release capsule     fluticasone-salmeterol (ADVAIR) 250-50 mcg/dose inhaler     furosemide (LASIX) 40 mg tablet     glipiZIDE (GLUCOTROL) 5 mg tablet     indomethacin (INDOCIN) 50 mg capsule     IRON PO     Linagliptin (TRADJENTA) 5 MG TABS     losartan (COZAAR) 50 mg tablet     metolazone (ZAROXOLYN) 5 mg tablet  Misc Natural Products (TART CHERRY ADVANCED PO)     Nutritional Supplements (VITAMIN D BOOSTER PO)     oxyCODONE-acetaminophen (PERCOCET) 5-325 mg per tablet     potassium chloride (MICRO-K) 10 MEQ CR capsule     POTASSIUM GLUCONATE PO     vitamin E, tocopherol, 400 units capsule     sodium chloride 0 9 % infusion     bumetanide (BUMEX) tablet 1 mg     finasteride (PROSCAR) tablet 5 mg     hydrOXYzine HCL (ATARAX) tablet 25 mg     zolpidem (AMBIEN) tablet 5 mg     fluticasone (FLONASE) 50 mcg/act nasal spray 2 spray     acetaminophen (TYLENOL) tablet 650 mg     aspirin tablet 325 mg     pantoprazole (PROTONIX) EC tablet 20 mg     pravastatin (PRAVACHOL) tablet 40 mg     cefTRIAXone (ROCEPHIN) IVPB (premix) 1,000 mg     levothyroxine tablet 125 mcg     sodium chloride 0 9 % infusion     cefTRIAXone (ROCEPHIN) IVPB (premix) 1,000 mg     hydrOXYzine HCL (ATARAX) tablet 25 mg     furosemide (LASIX) injection 40 mg     furosemide (LASIX) injection 40 mg        Andrew Bunk, DO

## 2018-03-12 NOTE — PLAN OF CARE
Problem: Potential for Falls  Goal: Patient will remain free of falls  INTERVENTIONS:  - Assess patient frequently for physical needs  -  Identify cognitive and physical deficits and behaviors that affect risk of falls    -  [place] fall precautions as indicated by assessment   - Educate patient/family on patient safety including physical limitations  - Instruct patient to call for assistance with activity based on assessment  - Modify environment to reduce risk of injury  - Consider OT/PT consult to assist with strengthening/mobility   Outcome: Adequate for Discharge      Problem: Prexisting or High Potential for Compromised Skin Integrity  Goal: Skin integrity is maintained or improved  INTERVENTIONS:  - Identify patients at risk for skin breakdown  - Assess and monitor skin integrity  - Assess and monitor nutrition and hydration status  - Monitor labs (i e  albumin)  - Assess for incontinence   - Turn and reposition patient  - Assist with mobility/ambulation  - Relieve pressure over bony prominences  - Avoid friction and shearing  - Provide appropriate hygiene as needed including keeping skin clean and dry  - Evaluate need for skin moisturizer/barrier cream  - Collaborate with interdisciplinary team (i e  Nutrition, Rehabilitation, etc )   - Patient/family teaching   Outcome: Adequate for Discharge      Problem: PAIN - ADULT  Goal: Verbalizes/displays adequate comfort level or baseline comfort level  Interventions:  - Encourage patient to monitor pain and request assistance  - Assess pain using appropriate pain scale  - Administer analgesics based on type and severity of pain and evaluate response  - Implement non-pharmacological measures as appropriate and evaluate response  - Consider cultural and social influences on pain and pain management  - Notify physician/advanced practitioner if interventions unsuccessful or patient reports new pain   Outcome: Adequate for Discharge      Problem: INFECTION - ADULT  Goal: Absence or prevention of progression during hospitalization  INTERVENTIONS:  - Assess and monitor for signs and symptoms of infection  - Monitor lab/diagnostic results  - Monitor all insertion sites, i e  indwelling lines, tubes, and drains  - Monitor endotracheal (as able) and nasal secretions for changes in amount and color  - [place] appropriate cooling/warming therapies per order  - Administer medications as ordered  - Instruct and encourage patient and family to use good hand hygiene technique  - Identify and instruct in appropriate isolation precautions for identified infection/condition   Outcome: Adequate for Discharge      Problem: SAFETY ADULT  Goal: Maintain or return to baseline ADL function  INTERVENTIONS:  -  Assess patient's ability to carry out ADLs; assess patient's baseline for ADL function and identify physical deficits which impact ability to perform ADLs (bathing, care of mouth/teeth, toileting, grooming, dressing, etc )  - Assess/evaluate cause of self-care deficits   - Assess range of motion  - Assess patient's mobility; develop plan if impaired  - Assess patient's need for assistive devices and provide as appropriate  - Encourage maximum independence but intervene and supervise when necessary  ¯ Involve family in performance of ADLs  ¯ Assess for home care needs following discharge   ¯ Request OT consult to assist with ADL evaluation and planning for discharge  ¯ Provide patient education as appropriate   Outcome: Adequate for Discharge    Goal: Maintain or return mobility status to optimal level  INTERVENTIONS:  - Assess patient's baseline mobility status (ambulation, transfers, stairs, etc )    - Identify cognitive and physical deficits and behaviors that affect mobility  - Identify mobility aids required to assist with transfers and/or ambulation (gait belt, sit-to-stand, lift, walker, cane, etc )  - [place] fall precautions as indicated by assessment  - Record patient progress and toleration of activity level on Mobility SBAR; progress patient to next Phase/Stage  - Instruct patient to call for assistance with activity based on assessment  - Request Rehabilitation consult to assist with strengthening/weightbearing, etc    Outcome: Adequate for Discharge      Problem: DISCHARGE PLANNING  Goal: Discharge to home or other facility with appropriate resources  INTERVENTIONS:  - Identify barriers to discharge w/patient and caregiver  - Arrange for needed discharge resources and transportation as appropriate  - Identify discharge learning needs (meds, wound care, etc )  - Arrange for interpretive services to assist at discharge as needed  - Refer to Case Management Department for coordinating discharge planning if the patient needs post-hospital services based on physician/advanced practitioner order or complex needs related to functional status, cognitive ability, or social support system   Outcome: Adequate for Discharge      Problem: Knowledge Deficit  Goal: Patient/family/caregiver demonstrates understanding of disease process, treatment plan, medications, and discharge instructions  Complete learning assessment and assess knowledge base  Interventions:  - Provide teaching at level of understanding  - Provide teaching via preferred learning methods   Outcome: Adequate for Discharge      Problem: Nutrition/Hydration-ADULT  Goal: Nutrient/Hydration intake appropriate for improving, restoring or maintaining nutritional needs  Monitor and assess patient's nutrition/hydration status for malnutrition (ex- brittle hair, bruises, dry skin, pale skin and conjunctiva, muscle wasting, smooth red tongue, and disorientation)  Collaborate with interdisciplinary team and initiate plan and interventions as ordered  Monitor patient's weight and dietary intake as ordered or per policy  Utilize nutrition screening tool and intervene per policy   Determine patient's food preferences and provide high-protein, high-caloric foods as appropriate       INTERVENTIONS:  - Monitor oral intake, urinary output, labs, and treatment plans  - Assess nutrition and hydration status and recommend course of action  - Evaluate amount of meals eaten  - Assist patient with eating if necessary   - Allow adequate time for meals  - Recommend/ encourage appropriate diets, oral nutritional supplements, and vitamin/mineral supplements  - Order, calculate, and assess calorie counts as needed  - Recommend, monitor, and adjust tube feedings and TPN/PPN based on assessed needs  - Assess need for intravenous fluids  - Provide specific nutrition/hydration education as appropriate  - Include patient/family/caregiver in decisions related to nutrition   Outcome: Adequate for Discharge

## 2018-03-12 NOTE — SOCIAL WORK
Per Gladis Maradiaga at [address] [facility] [address]  with pt's plan no authorization Is needed for ambulance transport  Med stat ambulance was already set up to transport pt at 12pm Pt's daughter aware as is Ruthann Treviño at [facility] admissions dept

## 2018-03-12 NOTE — SOCIAL WORK
[facility]aus[facility] at [facility] nursing and she is aware of the auth #

## 2018-03-19 ENCOUNTER — TELEPHONE (OUTPATIENT)
Dept: UROLOGY | Facility: CLINIC | Age: 83
End: 2018-03-19

## 2018-03-19 DIAGNOSIS — N20.0 NEPHROLITHIASIS: Primary | ICD-10-CM

## 2018-03-19 NOTE — CASE MANAGEMENT
Notification of Discharge  This is a Notification of Discharge from our facility [address]  Please be advised that this patient has been discharge from our facility  Below you will find the admission and discharge date and time including the patients disposition  PRESENTATION DATE: 2/27/2018 12:55 PM  IP ADMISSION DATE: 2/27/18 1935  DISCHARGE DATE: 3/12/2018  3:40 PM  DISPOSITION: Released to SNF/TCU/SNU [address] [place] [address] in the [facility] by Sony Giordano for 2017  Network Utilization Review Department  Phone: [phone]; Fax [phone]  ATTENTION: The Network Utilization Review Department is now centralized for our 7 Facilities  Make a note that we have a new phone and fax numbers for our Department  Please call with any questions or concerns to [phone] and carefully follow the prompts so that you are directed to the right person  All voicemails are confidential  Fax any determinations, approvals, denials, and requests for initial or continue stay review clinical to [phone]  Due to HIGH CALL volume, it would be easier if you could please send faxed requests to expedite your requests and in part, help us provide discharge notifications faster

## 2018-04-13 ENCOUNTER — TELEPHONE (OUTPATIENT)
Dept: UROLOGY | Facility: CLINIC | Age: 83
End: 2018-04-13

## 2023-11-08 NOTE — PHYSICAL THERAPY NOTE
PT Treatment Note      03/08/18 0851   Restrictions/Precautions   Other Precautions (Contact/isolation; Bed Alarm;Multiple lines;Telemetry;O2;Fall)   Subjective   Subjective Agreeable to therapy  Bed Mobility   Supine to Sit 4  Minimal assistance   Additional items Assist x 1;HOB elevated; Bedrails; Increased time required;Verbal cues;LE management   Additional Comments posterior lean upon standing, min x 1 to correct   Transfers   Sit to Stand 4  Minimal assistance   Additional items Assist x 1;Bedrails;Verbal cues   Stand to Sit 4  Minimal assistance   Additional items Assist x 1; Armrests; Verbal cues   Stand pivot 4  Minimal assistance   Additional items Assist x 1;Verbal cues   Ambulation/Elevation   Gait pattern Forward Flexion   Gait Assistance 4  Minimal assist   Additional items Assist x 1   Assistive Device Rolling walker   Distance 25'   Balance   Static Sitting Fair +   Dynamic Sitting Fair +   Static Standing Fair   Dynamic Standing Linda Low 5857  (with RW)   Endurance Deficit   Endurance Deficit Yes   Activity Tolerance   Activity Tolerance Patient limited by fatigue   Exercises   Hip Flexion 20 reps   Hip Abduction 20 reps   Hip Adduction 20 reps   Knee AROM Short Arc Quad 20 reps   Knee AROM Long Arc Quad 20 reps   Ankle Pumps 20 reps   Assessment   Prognosis Good   Problem List Decreased strength; Impaired balance;Decreased endurance;Decreased mobility; Decreased safety awareness   Assessment Perfoming functional mobility at (min) x1 level of function  Posterior lean upon standing  Excessively slow tomás, cues for technique and safety  Pt would benefit from continued PT as well as d/c back to [facility] with skilled PT when medically stable  Plan   Treatment/Interventions Functional transfer training;LE strengthening/ROM; Therapeutic exercise; Endurance training;Bed mobility;Gait training   Progress Slow progress, decreased activity tolerance   Pt   OOB with call bell within reach, scd's normal mood with appropriate affect connected and turned on and alarm on at end of PT session

## (undated) DEVICE — Device

## (undated) DEVICE — GAUZE SPONGES,16 PLY: Brand: CURITY

## (undated) DEVICE — GLOVE SRG BIOGEL 7.5

## (undated) DEVICE — GROUNDING PAD RFL

## (undated) DEVICE — GLOVE RADIATION 7 1/2 PF

## (undated) DEVICE — POV-IOD SWAB STICKS

## (undated) DEVICE — NEEDLE 18 G X 1 1/2

## (undated) DEVICE — NEEDLE 25G X 1 1/2

## (undated) DEVICE — SYRINGE 5ML LL